# Patient Record
Sex: FEMALE | Race: WHITE | Employment: FULL TIME | URBAN - METROPOLITAN AREA
[De-identification: names, ages, dates, MRNs, and addresses within clinical notes are randomized per-mention and may not be internally consistent; named-entity substitution may affect disease eponyms.]

---

## 2017-07-19 ENCOUNTER — OFFICE VISIT (OUTPATIENT)
Dept: FAMILY MEDICINE CLINIC | Age: 32
End: 2017-07-19

## 2017-07-19 VITALS
HEIGHT: 64 IN | RESPIRATION RATE: 16 BRPM | SYSTOLIC BLOOD PRESSURE: 136 MMHG | HEART RATE: 79 BPM | BODY MASS INDEX: 50.02 KG/M2 | DIASTOLIC BLOOD PRESSURE: 86 MMHG | OXYGEN SATURATION: 96 % | TEMPERATURE: 98.9 F | WEIGHT: 293 LBS

## 2017-07-19 DIAGNOSIS — R07.89 CHEST WALL PAIN: Primary | ICD-10-CM

## 2017-07-19 RX ORDER — LABETALOL 200 MG/1
200 TABLET, FILM COATED ORAL 2 TIMES DAILY
Qty: 60 TAB | Refills: 2 | Status: SHIPPED | OUTPATIENT
Start: 2017-07-19 | End: 2018-05-14

## 2017-07-20 PROBLEM — R07.89 CHEST WALL PAIN: Status: ACTIVE | Noted: 2017-07-20

## 2017-07-20 NOTE — PROGRESS NOTES
Celine Hay is a 27 y.o.  female and presents with reproducible chest wall pain with no pressure type, SOB, anxiety pr radiation to neck or arms. It appeatrs to be constant. She would like to be pregnant but tends to have elevated BP. Patient will be monitoring BP at home. Chief Complaint   Patient presents with    Chest Pain     Subjective: Additional Concerns: non    Patient Active Problem List    Diagnosis Date Noted    Chronic bilateral low back pain without sciatica 07/30/2016    Essential hypertension 01/16/2016    Asthma 01/16/2016    Anxiety 01/16/2016     Current Outpatient Prescriptions   Medication Sig Dispense Refill    labetalol (NORMODYNE) 200 mg tablet Take 1 Tab by mouth two (2) times a day. Indications: hypertension 60 Tab 2    VENTOLIN HFA 90 mcg/actuation inhaler INHALE 2 PUFFS BY MOUTH EVERY 4 HOURS AS NEEDED FOR WHEEZE OR SHORTNESS OF BREATH 1 Inhaler 1    lorcaserin (BELVIQ) 10 mg tab Take 10 mg by mouth two (2) times a day. Max Daily Amount: 20 mg. 60 Tab 0    traMADol (ULTRAM) 50 mg tablet Take 1 Tab by mouth every eight (8) hours as needed for Pain.  Max Daily Amount: 150 mg. 30 Tab 0     No Known Allergies  Past Medical History:   Diagnosis Date    Anxiety     Asthma     Depression     Hypertension      Past Surgical History:   Procedure Laterality Date    HX OTHER SURGICAL      Bladder surger 200    HX WISDOM TEETH EXTRACTION       Family History   Problem Relation Age of Onset    Hypertension Mother     Hypertension Father     Other Brother      Born with heart murmur    Cancer Maternal Grandmother      Breast    Diabetes Maternal Grandfather      Social History   Substance Use Topics    Smoking status: Never Smoker    Smokeless tobacco: Never Used    Alcohol use No     ROS     General: negative for - chills, fatigue, fever, weight change  Resp: negative for - cough, shortness of breath or wheezing  CV: negative for - chest pain, edema or palpitations  MSK: negative for - joint pain, joint swelling or muscle pain  Neuro: negative for - confusion, headaches, seizures or weakness    Objective:  Vitals:    07/19/17 1643   BP: 136/86   Pulse: 79   Resp: 16   Temp: 98.9 °F (37.2 °C)   TempSrc: Oral   SpO2: 96%   Weight: 322 lb 3.2 oz (146.1 kg)   Height: 5' 4\" (1.626 m)   PainSc:   0 - No pain   LMP: 07/16/2017     PE    alert, well appearing, and in no distress and overweight  Mental status - alert, oriented to person, place, and time, normal mood, behavior, speech, dress, motor activity, and thought processes  Chest - clear to auscultation, no wheezes, rales or rhonchi, symmetric air entry  Heart - normal rate, regular rhythm, normal S1, S2, no murmurs, rubs, clicks or gallops  Musculoskeletal - no joint tenderness, deformity or swelling  Extremities - peripheral pulses normal, no pedal edema, no clubbing or cyanosis    LABS   No visits with results within 6 Month(s) from this visit. Latest known visit with results is:    Office Visit on 03/01/2016   Component Date Value Ref Range Status    VALID INTERNAL CONTROL POC 03/01/2016 Yes   Final    HCG urine, Ql. (POC) 03/01/2016 Negative  Negative Final       TESTS  Results for orders placed or performed in visit on 03/01/16   AMB POC URINE PREGNANCY TEST, VISUAL COLOR COMPARISON   Result Value Ref Range    VALID INTERNAL CONTROL POC Yes     HCG urine, Ql. (POC) Negative Negative     Assessment/Plan:    1. Chest wall pain -  OTC tylenol     2. Elevated BP with no dx of HTN - [place] monitor BP. Given Labetalol for BP in case it is persistently greater than 140/90. Lab review: no lab studies available for review at time of visit    I have discussed the diagnosis with the patient and the intended plan as seen in the above orders. The patient has received an after-visit summary and questions were answered concerning future plans.   I have discussed medication side effects and warnings with the patient as well.I have reviewed the plan of care with the patient, accepted their input and they are in agreement with the treatment goals. F/U as needed.      Phu Trent MD

## 2017-07-20 NOTE — PATIENT INSTRUCTIONS
Learning About High Blood Pressure  What is high blood pressure? Blood pressure is a measure of how hard the blood pushes against the walls of your arteries. It's normal for blood pressure to go up and down throughout the day, but if it stays up, you have high blood pressure. Another name for high blood pressure is hypertension. Two numbers tell you your blood pressure. The first number is the systolic pressure. It shows how hard the blood pushes when your heart is pumping. The second number is the diastolic pressure. It shows how hard the blood pushes between heartbeats, when your heart is relaxed and filling with blood. A blood pressure of less than 120/80 (say \"120 over 80\") is ideal for an adult. High blood pressure is 140/90 or higher. You have high blood pressure if your top number is 140 or higher or your bottom number is 90 or higher, or both. Many people fall into the category in between, called prehypertension. People with prehypertension need to make lifestyle changes to bring their blood pressure down and help prevent or delay high blood pressure. What happens when you have high blood pressure? · Blood flows through your arteries with too much force. Over time, this damages the walls of your arteries. But you can't feel it. High blood pressure usually doesn't cause symptoms. · Fat and calcium start to build up in your arteries. This buildup is called plaque. Plaque makes your arteries narrower and stiffer. Blood can't flow through them as easily. · This lack of good blood flow starts to damage some of the organs in your body. This can lead to problems such as coronary artery disease and heart attack, heart failure, stroke, kidney failure, and eye damage. How can you prevent high blood pressure? · Stay at a healthy weight. · Try to limit how much sodium you eat to less than 2,300 milligrams (mg) a day.  If you limit your sodium to 1,500 mg a day, you can lower your blood pressure even more.  ¨ Buy foods that are labeled \"unsalted,\" \"sodium-free,\" or \"low-sodium. \" Foods labeled \"reduced-sodium\" and \"light sodium\" may still have too much sodium. ¨ Flavor your food with garlic, lemon juice, onion, vinegar, herbs, and spices instead of salt. Do not use soy sauce, steak sauce, onion salt, garlic salt, mustard, or ketchup on your food. ¨ Use less salt (or none) when recipes call for it. You can often use half the salt a recipe calls for without losing flavor. · Be physically active. Get at least 30 minutes of exercise on most days of the week. Walking is a good choice. You also may want to do other activities, such as running, swimming, cycling, or playing tennis or team sports. · Limit alcohol to 2 drinks a day for men and 1 drink a day for women. · Eat plenty of fruits, vegetables, and low-fat dairy products. Eat less saturated and total fats. How is high blood pressure treated? · Your doctor will suggest making lifestyle changes. For example, your doctor may ask you to eat healthy foods, quit smoking, lose extra weight, and be more active. · If lifestyle changes don't help enough or your blood pressure is very high, you will have to take medicine every day. Follow-up care is a key part of your treatment and safety. Be sure to make and go to all appointments, and call your doctor if you are having problems. It's also a good idea to know your test results and keep a list of the medicines you take. Where can you learn more? Go to http://evelin-radha.info/. Enter P501 in the search box to learn more about \"Learning About High Blood Pressure. \"  Current as of: March 23, 2016  Content Version: 11.3  © 8047-8490 [employer]. Care instructions adapted under license by [employer] (which disclaims liability or warranty for this information).  If you have questions about a medical condition or this instruction, always ask your healthcare professional. [employer] disclaims any warranty or liability for your use of this information.

## 2018-01-02 ENCOUNTER — TELEPHONE (OUTPATIENT)
Dept: FAMILY MEDICINE CLINIC | Age: 33
End: 2018-01-02

## 2018-01-02 ENCOUNTER — OFFICE VISIT (OUTPATIENT)
Dept: FAMILY MEDICINE CLINIC | Age: 33
End: 2018-01-02

## 2018-01-02 VITALS
HEIGHT: 64 IN | HEART RATE: 84 BPM | DIASTOLIC BLOOD PRESSURE: 107 MMHG | TEMPERATURE: 98.3 F | SYSTOLIC BLOOD PRESSURE: 148 MMHG | WEIGHT: 293 LBS | RESPIRATION RATE: 17 BRPM | BODY MASS INDEX: 50.02 KG/M2 | OXYGEN SATURATION: 97 %

## 2018-01-02 DIAGNOSIS — R63.4 WEIGHT LOSS: Primary | ICD-10-CM

## 2018-01-02 RX ORDER — PHENTERMINE HYDROCHLORIDE 37.5 MG/1
37.5 TABLET ORAL
Qty: 30 TAB | Refills: 0 | Status: SHIPPED | OUTPATIENT
Start: 2018-01-02 | End: 2018-02-02 | Stop reason: SDUPTHER

## 2018-01-02 NOTE — PROGRESS NOTES
Laurence Robins is a 27 y.o. female presents to office for anxiety and depression. Weight concern. Bump on right side of head      1.  Have you been to the ER, urgent care clinic or hospitalized since your last visit? no          Health Maintenance items with a due date reviewed with patient:  Health Maintenance Due   Topic Date Due    Pneumococcal 19-64 Medium Risk (1 of 1 - PPSV23) 01/26/2002    DTaP/Tdap/Td series (1 - Tdap) 01/26/2004    PAP AKA CERVICAL CYTOLOGY  01/26/2004    Influenza Age 9 to Adult  08/01/2017

## 2018-01-02 NOTE — PATIENT INSTRUCTIONS
Anxiety Disorder: Care Instructions  Your Care Instructions    Anxiety is a normal reaction to stress. Difficult situations can cause you to have symptoms such as sweaty palms and a nervous feeling. In an anxiety disorder, the symptoms are far more severe. Constant worry, muscle tension, trouble sleeping, nausea and diarrhea, and other symptoms can make normal daily activities difficult or impossible. These symptoms may occur for no reason, and they can affect your work, school, or social life. Medicines, counseling, and self-care can all help. Follow-up care is a key part of your treatment and safety. Be sure to make and go to all appointments, and call your doctor if you are having problems. It's also a good idea to know your test results and keep a list of the medicines you take. How can you care for yourself at home? · Take medicines exactly as directed. Call your doctor if you think you are having a problem with your medicine. · Go to your counseling sessions and follow-up appointments. · Recognize and accept your anxiety. Then, when you are in a situation that makes you anxious, say to yourself, \"This is not an emergency. I feel uncomfortable, but I am not in danger. I can keep going even if I feel anxious. \"  · Be kind to your body:  ¨ Relieve tension with exercise or a massage. ¨ Get enough rest.  ¨ Avoid alcohol, caffeine, nicotine, and illegal drugs. They can increase your anxiety level and cause sleep problems. ¨ Learn and do relaxation techniques. See below for more about these techniques. · Engage your mind. Get out and do something you enjoy. Go to a funny movie, or take a walk or hike. Plan your day. Having too much or too little to do can make you anxious. · Keep a record of your symptoms. Discuss your fears with a good friend or family member, or join a support group for people with similar problems. Talking to others sometimes relieves stress.   · Get involved in social groups, or volunteer to help others. Being alone sometimes makes things seem worse than they are. · Get at least 30 minutes of exercise on most days of the week to relieve stress. Walking is a good choice. You also may want to do other activities, such as running, swimming, cycling, or playing tennis or team sports. Relaxation techniques  Do relaxation exercises 10 to 20 minutes a day. You can play soothing, relaxing music while you do them, if you wish. · Tell others in your house that you are going to do your relaxation exercises. Ask them not to disturb you. · Find a comfortable place, away from all distractions and noise. · Lie down on your back, or sit with your back straight. · Focus on your breathing. Make it slow and steady. · Breathe in through your nose. Breathe out through either your nose or mouth. · Breathe deeply, filling up the area between your navel and your rib cage. Breathe so that your belly goes up and down. · Do not hold your breath. · Breathe like this for 5 to 10 minutes. Notice the feeling of calmness throughout your whole body. As you continue to breathe slowly and deeply, relax by doing the following for another 5 to 10 minutes:  · Tighten and relax each muscle group in your body. You can begin at your toes and work your way up to your head. · Imagine your muscle groups relaxing and becoming heavy. · Empty your mind of all thoughts. · Let yourself relax more and more deeply. · Become aware of the state of calmness that surrounds you. · When your relaxation time is over, you can bring yourself back to alertness by moving your fingers and toes and then your hands and feet and then stretching and moving your entire body. Sometimes people fall asleep during relaxation, but they usually wake up shortly afterward. · Always give yourself time to return to full alertness before you drive a car or do anything that might cause an accident if you are not fully alert.  Never play a relaxation tape while you drive a car. When should you call for help? Call 911 anytime you think you may need emergency care. For example, call if:  ? · You feel you cannot stop from hurting yourself or someone else. ? Keep the numbers for these national suicide hotlines: 9-274-846-TALK (8-979.807.9217) and 3-492-BLDRUNK (5-911.741.9533). If you or someone you know talks about suicide or feeling hopeless, get help right away. ? Watch closely for changes in your health, and be sure to contact your doctor if:  ? · You have anxiety or fear that affects your life. ? · You have symptoms of anxiety that are new or different from those you had before. Where can you learn more? Go to http://evelin-radha.info/. Enter P754 in the search box to learn more about \"Anxiety Disorder: Care Instructions. \"  Current as of: May 12, 2017  Content Version: 11.4  © 8888-7390 [employer]. Care instructions adapted under license by [employer] (which disclaims liability or warranty for this information). If you have questions about a medical condition or this instruction, always ask your healthcare professional. [address] any warranty or liability for your use of this information. Learning About Bariatric Surgery  What is bariatric surgery? Bariatric surgery is surgery to help you lose weight. This type of surgery is only used for people who are very overweight and have not been able to lose weight with diet and exercise. This surgery makes the stomach smaller. Some types of surgery also change the connection between your stomach and intestines. How is bariatric surgery done? Bariatric surgery may be either \"open\" or \"laparoscopic. \" Open surgery is done through a large cut (incision) in the belly. Laparoscopic surgery is done through several small cuts. The doctor puts a lighted tube, or scope, and other surgical tools through small cuts in your belly.  The doctor is able to see your organs with the scope. There are different types of bariatric surgery. Gastric sleeve surgery  The surgery is usually done through several small incisions in the belly. The doctor removes more than half of your stomach. This leaves a thin sleeve, or tube, that is about the size of a banana. Because part of your stomach has been removed, this can't be reversed. Nandini-en-Y gastric bypass surgery  Nandini-en-Y (say \"liana-en-why\") surgery changes the connection between the stomach and the intestines. The doctor separates a section of your stomach from the rest of your stomach. This makes a small pouch. The new pouch will hold the food you eat. The doctor connects the stomach pouch to the middle part of the small intestine. Gastric banding surgery  The surgery is usually done through several small incisions in the belly. The doctor wraps a band around the upper part of the stomach. This creates a small pouch. The small size of the pouch means that you will get full after you eat just a small amount of food. The doctor can inflate or deflate the band to adjust the size. This lets the doctor adjust how quickly food passes from the new pouch into the stomach. It does not change the connection between the stomach and the intestines. What can you expect after the surgery? You may stay in the hospital for one or more days after the surgery. How long you stay depends on the type of surgery you had. Most people need 2 to 4 weeks before they are ready to get back to their usual routine. For the first 2 to 6 weeks after surgery, you probably will need to follow a liquid or soft diet. Bit by bit, you will be able to eat more solid foods. Your doctor may advise you to work with a dietitian. This way you'll be sure to get enough protein, vitamins, and minerals while you are losing weight. Even with a healthy diet, you may need to take vitamin and mineral supplements.   After surgery, you will not be able to eat very much at one time. You will get full quickly. Try not to eat too much at one time or eat foods that are high in fat or sugar. If you do, you may vomit, get stomach pain, or have diarrhea. You probably will lose weight very quickly in the first few months after surgery. As time goes on, your weight loss will slow down. You will have regular doctor visits to check how you are doing. Think of bariatric surgery as a tool to help you lose weight. It isn't an instant fix. You will still need to eat a healthy diet and get regular exercise. This will help you reach your weight goal and avoid regaining the weight you lose. Follow-up care is a key part of your treatment and safety. Be sure to make and go to all appointments, and call your doctor if you are having problems. It's also a good idea to know your test results and keep a list of the medicines you take. Where can you learn more? Go to http://evelinMD Lingoradha.info/. Enter G469 in the search box to learn more about \"Learning About Bariatric Surgery. \"  Current as of: October 13, 2016  Content Version: 11.4  © 0627-9130 [employer]. Care instructions adapted under license by [employer] (which disclaims liability or warranty for this information). If you have questions about a medical condition or this instruction, always ask your healthcare professional. [address] any warranty or liability for your use of this information. Low Sodium Diet (2,000 Milligram): Care Instructions  Your Care Instructions    Too much sodium causes your body to hold on to extra water. This can raise your blood pressure and force your heart and kidneys to work harder. In very serious cases, this could cause you to be put in the hospital. It might even be life-threatening. By limiting sodium, you will feel better and lower your risk of serious problems. The most common source of sodium is salt.  People get most of the salt in their diet from canned, prepared, and packaged foods. Fast food and restaurant meals also are very high in sodium. Your doctor will probably limit your sodium to less than 2,000 milligrams (mg) a day. This limit counts all the sodium in prepared and packaged foods and any salt you add to your food. Follow-up care is a key part of your treatment and safety. Be sure to make and go to all appointments, and call your doctor if you are having problems. It's also a good idea to know your test results and keep a list of the medicines you take. How can you care for yourself at home? Read food labels  · Read labels on cans and food packages. The labels tell you how much sodium is in each serving. Make sure that you look at the serving size. If you eat more than the serving size, you have eaten more sodium. · Food labels also tell you the Percent Daily Value for sodium. Choose products with low Percent Daily Values for sodium. · Be aware that sodium can come in forms other than salt, including monosodium glutamate (MSG), sodium citrate, and sodium bicarbonate (baking soda). MSG is often added to Asian food. When you eat out, you can sometimes ask for food without MSG or added salt. Buy low-sodium foods  · Buy foods that are labeled \"unsalted\" (no salt added), \"sodium-free\" (less than 5 mg of sodium per serving), or \"low-sodium\" (less than 140 mg of sodium per serving). Foods labeled \"reduced-sodium\" and \"light sodium\" may still have too much sodium. Be sure to read the label to see how much sodium you are getting. · Buy fresh vegetables, or frozen vegetables without added sauces. Buy low-sodium versions of canned vegetables, soups, and other canned goods. Prepare low-sodium meals  · Cut back on the amount of salt you use in cooking. This will help you adjust to the taste. Do not add salt after cooking. One teaspoon of salt has about 2,300 mg of sodium. · Take the salt shaker off the table.   · Flavor your food with garlic, lemon juice, onion, vinegar, herbs, and spices. Do not use soy sauce, lite soy sauce, steak sauce, onion salt, garlic salt, celery salt, mustard, or ketchup on your food. · Use low-sodium salad dressings, sauces, and ketchup. Or make your own salad dressings and sauces without adding salt. · Use less salt (or none) when recipes call for it. You can often use half the salt a recipe calls for without losing flavor. Other foods such as rice, pasta, and grains do not need added salt. · Rinse canned vegetables, and cook them in fresh water. This removes some-but not all-of the salt. · Avoid water that is naturally high in sodium or that has been treated with water softeners, which add sodium. Call your local water company to find out the sodium content of your water supply. If you buy bottled water, read the label and choose a sodium-free brand. Avoid high-sodium foods  · Avoid eating:  ¨ Smoked, cured, salted, and canned meat, fish, and poultry. ¨ Ham, mays, hot dogs, and luncheon meats. ¨ Regular, hard, and processed cheese and regular peanut butter. ¨ Crackers with salted tops, and other salted snack foods such as pretzels, chips, and salted popcorn. ¨ Frozen prepared meals, unless labeled low-sodium. ¨ Canned and dried soups, broths, and bouillon, unless labeled sodium-free or low-sodium. ¨ Canned vegetables, unless labeled sodium-free or low-sodium. ¨ [place] fries, pizza, tacos, and other fast foods. ¨ Pickles, olives, ketchup, and other condiments, especially soy sauce, unless labeled sodium-free or low-sodium. Where can you learn more? Go to http://evelin-radha.info/. Enter A384 in the search box to learn more about \"Low Sodium Diet (2,000 Milligram): Care Instructions. \"  Current as of: May 12, 2017  Content Version: 11.4  © 9469-4805 [employer].  Care instructions adapted under license by [employer] (which disclaims liability or warranty for this information). If you have questions about a medical condition or this instruction, always ask your healthcare professional. [address] any warranty or liability for your use of this information. DASH Diet: Care Instructions  Your Care Instructions    The DASH diet is an eating plan that can help lower your blood pressure. DASH stands for Dietary Approaches to Stop Hypertension. Hypertension is high blood pressure. The DASH diet focuses on eating foods that are high in calcium, potassium, and magnesium. These nutrients can lower blood pressure. The foods that are highest in these nutrients are fruits, vegetables, low-fat dairy products, nuts, seeds, and legumes. But taking calcium, potassium, and magnesium supplements instead of eating foods that are high in those nutrients does not have the same effect. The DASH diet also includes whole grains, fish, and poultry. The DASH diet is one of several lifestyle changes your doctor may recommend to lower your high blood pressure. Your doctor may also want you to decrease the amount of sodium in your diet. Lowering sodium while following the DASH diet can lower blood pressure even further than just the DASH diet alone. Follow-up care is a key part of your treatment and safety. Be sure to make and go to all appointments, and call your doctor if you are having problems. It's also a good idea to know your test results and keep a list of the medicines you take. How can you care for yourself at home? Following the DASH diet  · Eat 4 to 5 servings of fruit each day. A serving is 1 medium-sized piece of fruit, ½ cup chopped or canned fruit, 1/4 cup dried fruit, or 4 ounces (½ cup) of fruit juice. Choose fruit more often than fruit juice. · Eat 4 to 5 servings of vegetables each day. A serving is 1 cup of lettuce or raw leafy vegetables, ½ cup of chopped or cooked vegetables, or 4 ounces (½ cup) of vegetable juice.  Choose vegetables more often than vegetable juice. · Get 2 to 3 servings of low-fat and fat-free dairy each day. A serving is 8 ounces of milk, 1 cup of yogurt, or 1 ½ ounces of cheese. · Eat 6 to 8 servings of grains each day. A serving is 1 slice of bread, 1 ounce of dry cereal, or ½ cup of cooked rice, pasta, or cooked cereal. Try to choose whole-grain products as much as possible. · Limit lean meat, poultry, and fish to 2 servings each day. A serving is 3 ounces, about the size of a deck of cards. · Eat 4 to 5 servings of nuts, seeds, and legumes (cooked dried beans, lentils, and split peas) each week. A serving is 1/3 cup of nuts, 2 tablespoons of seeds, or ½ cup of cooked beans or peas. · Limit fats and oils to 2 to 3 servings each day. A serving is 1 teaspoon of vegetable oil or 2 tablespoons of salad dressing. · Limit sweets and added sugars to 5 servings or less a week. A serving is 1 tablespoon jelly or jam, ½ cup sorbet, or 1 cup of lemonade. · Eat less than 2,300 milligrams (mg) of sodium a day. If you limit your sodium to 1,500 mg a day, you can lower your blood pressure even more. Tips for success  · Start small. Do not try to make dramatic changes to your diet all at once. You might feel that you are missing out on your favorite foods and then be more likely to not follow the plan. Make small changes, and stick with them. Once those changes become habit, add a few more changes. · Try some of the following:  ¨ Make it a goal to eat a fruit or vegetable at every meal and at snacks. This will make it easy to get the recommended amount of fruits and vegetables each day. ¨ Try yogurt topped with fruit and nuts for a snack or healthy dessert. ¨ Add lettuce, tomato, cucumber, and onion to sandwiches. ¨ Combine a ready-made pizza crust with low-fat mozzarella cheese and lots of vegetable toppings. Try using tomatoes, squash, spinach, broccoli, carrots, cauliflower, and onions.   ¨ Have a variety of cut-up vegetables with a low-fat dip as an appetizer instead of chips and dip. ¨ Sprinkle sunflower seeds or chopped almonds over salads. Or try adding chopped walnuts or almonds to cooked vegetables. ¨ Try some vegetarian meals using beans and peas. Add garbanzo or kidney beans to salads. Make burritos and tacos with mashed marion beans or black beans. Where can you learn more? Go to http://evelin-radha.info/. Enter I266 in the search box to learn more about \"DASH Diet: Care Instructions. \"  Current as of: September 21, 2016  Content Version: 11.4  © 1459-0760 [employer]. Care instructions adapted under license by [employer] (which disclaims liability or warranty for this information). If you have questions about a medical condition or this instruction, always ask your healthcare professional. [address] any warranty or liability for your use of this information.

## 2018-01-02 NOTE — PROGRESS NOTES
Nancy Shay is a 27 y.o.  female and presents with difficulty losing weight. Had been through medically supervised  Weight loss program. She is anxious about her weight and would like to retry phentermine which she had many years ago. Het BP is elevated today   However. Chief Complaint   Patient presents with    Weight Gain    Anxiety    Depression     Subjective: Additional Concerns: none    Patient Active Problem List    Diagnosis Date Noted    Chest wall pain 07/20/2017    Chronic bilateral low back pain without sciatica 07/30/2016    Essential hypertension 01/16/2016    Asthma 01/16/2016    Anxiety 01/16/2016     Current Outpatient Prescriptions   Medication Sig Dispense Refill    phentermine (ADIPEX-P) 37.5 mg tablet Take 1 Tab by mouth every morning. Max Daily Amount: 37.5 mg. Indications: WEIGHT LOSS MANAGEMENT FOR OBESE PATIENT (BMI >= 30) 30 Tab 0    labetalol (NORMODYNE) 200 mg tablet Take 1 Tab by mouth two (2) times a day.  Indications: hypertension 60 Tab 2     No Known Allergies  Past Medical History:   Diagnosis Date    Anxiety     Asthma     Depression     Hypertension      Past Surgical History:   Procedure Laterality Date    HX OTHER SURGICAL      Bladder surger 200    HX WISDOM TEETH EXTRACTION       Family History   Problem Relation Age of Onset    Hypertension Mother     Hypertension Father     Other Brother      Born with heart murmur    Cancer Maternal Grandmother      Breast    Diabetes Maternal Grandfather      Social History   Substance Use Topics    Smoking status: Never Smoker    Smokeless tobacco: Never Used    Alcohol use No     ROS     General: negative for - chills, fatigue, fever, weight change  Psych: negative for - anxiety, depression, irritability or mood swings  Resp: negative for - cough, shortness of breath or wheezing  CV: negative for - chest pain, edema or palpitations  GI: negative for - abdominal pain, change in bowel habits, constipation, diarrhea or nausea/vomiting  : negative for - dysuria, hematuria, incontinence, pelvic pain or vulvar/vaginal symptoms  MSK: negative for - joint pain, joint swelling or muscle pain  Neuro: negative for - confusion, headaches, seizures or weakness  Derm: negative for - dry skin, hair changes, rash or skin lesion changes    Objective:  Vitals:    01/02/18 1112   BP: (!) 148/107   Pulse: 84   Resp: 17   Temp: 98.3 °F (36.8 °C)   TempSrc: Oral   SpO2: 97%   Weight: 328 lb 3.2 oz (148.9 kg)   Height: 5' 4\" (1.626 m)   PainSc:   0 - No pain   LMP: 12/04/2017     PE    Alert, well appearing, and in no distress, oriented to person, place, and time and overweight  General appearance - alert, well appearing, and in no distress and oriented to person, place, and time  Mental status - alert, oriented to person, place, and time, normal mood, behavior, speech, dress, motor activity, and thought processes  Chest - clear to auscultation, no wheezes, rales or rhonchi, symmetric air entry  Heart - normal rate, regular rhythm, normal S1, S2, no murmurs, rubs, clicks or gallops  Extremities - peripheral pulses normal, no pedal edema, no clubbing or cyanosis    LABS   No visits with results within 6 Month(s) from this visit. Latest known visit with results is:    Office Visit on 03/01/2016   Component Date Value Ref Range Status    VALID INTERNAL CONTROL POC 03/01/2016 Yes   Final    HCG urine, Ql. (POC) 03/01/2016 Negative  Negative Final       TESTS  Results for orders placed or performed in visit on 03/01/16   AMB POC URINE PREGNANCY TEST, VISUAL COLOR COMPARISON   Result Value Ref Range    VALID INTERNAL CONTROL POC Yes     HCG urine, Ql. (POC) Negative Negative     Assessment/Plan:      Weight gain, chronic - Trial of   - phentermine (ADIPEX-P) 37.5 mg tablet; Take 1 Tab by mouth every morning. Max Daily Amount: 37.5 mg. Indications: WEIGHT LOSS MANAGEMENT FOR OBESE PATIENT (BMI >= 30)  Dispense: 30 Tab;  Refill: 0  Patient given free support online for weight loss. Bariatric info given to patient for possible consultation. Lab review: orders written for new lab studies as appropriate; see orders. I have discussed the diagnosis with the patient and the intended plan as seen in the above orders. The patient has received an after-visit summary and questions were answered concerning future plans. I have discussed medication side effects and warnings with the patient as well. I have reviewed the plan of care with the patient, accepted their input and they are in agreement with the treatment goals. Follow-up Disposition:  Return in about 1 month (around 2/2/2018), or if symptoms worsen or fail to improve.     Maylin Arambula MD

## 2018-01-03 ENCOUNTER — CLINICAL SUPPORT (OUTPATIENT)
Dept: FAMILY MEDICINE CLINIC | Age: 33
End: 2018-01-03

## 2018-01-03 ENCOUNTER — HOSPITAL ENCOUNTER (OUTPATIENT)
Dept: LAB | Age: 33
Discharge: HOME | End: 2018-01-03
Payer: COMMERCIAL

## 2018-01-03 VITALS — HEART RATE: 92 BPM | DIASTOLIC BLOOD PRESSURE: 103 MMHG | SYSTOLIC BLOOD PRESSURE: 164 MMHG

## 2018-01-03 DIAGNOSIS — Z01.30 BP CHECK: Primary | ICD-10-CM

## 2018-01-03 DIAGNOSIS — R63.4 WEIGHT LOSS: ICD-10-CM

## 2018-01-03 LAB
ALBUMIN SERPL-MCNC: 3.5 G/DL (ref 3.4–5)
ALBUMIN/GLOB SERPL: 1.1 {RATIO} (ref 0.8–1.7)
ALP SERPL-CCNC: 110 U/L (ref 45–117)
ALT SERPL-CCNC: 29 U/L (ref 13–56)
ANION GAP SERPL CALC-SCNC: 9 MMOL/L (ref 3–18)
AST SERPL-CCNC: 15 U/L (ref 15–37)
BASOPHILS # BLD: 0 K/UL (ref 0–0.06)
BASOPHILS NFR BLD: 0 % (ref 0–2)
BILIRUB SERPL-MCNC: 0.3 MG/DL (ref 0.2–1)
BUN SERPL-MCNC: 11 MG/DL (ref 7–18)
BUN/CREAT SERPL: 16 (ref 12–20)
CALCIUM SERPL-MCNC: 8.5 MG/DL (ref 8.5–10.1)
CHLORIDE SERPL-SCNC: 105 MMOL/L (ref 100–108)
CHOLEST SERPL-MCNC: 207 MG/DL
CO2 SERPL-SCNC: 27 MMOL/L (ref 21–32)
CREAT SERPL-MCNC: 0.67 MG/DL (ref 0.6–1.3)
DIFFERENTIAL METHOD BLD: NORMAL
EOSINOPHIL # BLD: 0.2 K/UL (ref 0–0.4)
EOSINOPHIL NFR BLD: 3 % (ref 0–5)
ERYTHROCYTE [DISTWIDTH] IN BLOOD BY AUTOMATED COUNT: 13.5 % (ref 11.6–14.5)
EST. AVERAGE GLUCOSE BLD GHB EST-MCNC: 120 MG/DL
GLOBULIN SER CALC-MCNC: 3.3 G/DL (ref 2–4)
GLUCOSE SERPL-MCNC: 106 MG/DL (ref 74–99)
HBA1C MFR BLD: 5.8 % (ref 4.2–5.6)
HCT VFR BLD AUTO: 42.4 % (ref 35–45)
HDLC SERPL-MCNC: 60 MG/DL (ref 40–60)
HDLC SERPL: 3.5 {RATIO} (ref 0–5)
HGB BLD-MCNC: 13.6 G/DL (ref 12–16)
LDLC SERPL CALC-MCNC: 127.4 MG/DL (ref 0–100)
LIPID PROFILE,FLP: ABNORMAL
LYMPHOCYTES # BLD: 2.1 K/UL (ref 0.9–3.6)
LYMPHOCYTES NFR BLD: 31 % (ref 21–52)
MCH RBC QN AUTO: 29 PG (ref 24–34)
MCHC RBC AUTO-ENTMCNC: 32.1 G/DL (ref 31–37)
MCV RBC AUTO: 90.4 FL (ref 74–97)
MONOCYTES # BLD: 0.4 K/UL (ref 0.05–1.2)
MONOCYTES NFR BLD: 6 % (ref 3–10)
NEUTS SEG # BLD: 4.1 K/UL (ref 1.8–8)
NEUTS SEG NFR BLD: 60 % (ref 40–73)
PLATELET # BLD AUTO: 267 K/UL (ref 135–420)
PMV BLD AUTO: 10.9 FL (ref 9.2–11.8)
POTASSIUM SERPL-SCNC: 4.2 MMOL/L (ref 3.5–5.5)
PROT SERPL-MCNC: 6.8 G/DL (ref 6.4–8.2)
RBC # BLD AUTO: 4.69 M/UL (ref 4.2–5.3)
SODIUM SERPL-SCNC: 141 MMOL/L (ref 136–145)
TRIGL SERPL-MCNC: 98 MG/DL (ref ?–150)
TSH SERPL DL<=0.05 MIU/L-ACNC: 1.16 UIU/ML (ref 0.36–3.74)
VLDLC SERPL CALC-MCNC: 19.6 MG/DL
WBC # BLD AUTO: 6.8 K/UL (ref 4.6–13.2)

## 2018-01-03 PROCEDURE — 85025 COMPLETE CBC W/AUTO DIFF WBC: CPT | Performed by: FAMILY MEDICINE

## 2018-01-03 PROCEDURE — 80053 COMPREHEN METABOLIC PANEL: CPT | Performed by: FAMILY MEDICINE

## 2018-01-03 PROCEDURE — 80061 LIPID PANEL: CPT | Performed by: FAMILY MEDICINE

## 2018-01-03 PROCEDURE — 84443 ASSAY THYROID STIM HORMONE: CPT | Performed by: FAMILY MEDICINE

## 2018-01-03 PROCEDURE — 36415 COLL VENOUS BLD VENIPUNCTURE: CPT | Performed by: FAMILY MEDICINE

## 2018-01-03 PROCEDURE — 83036 HEMOGLOBIN GLYCOSYLATED A1C: CPT | Performed by: FAMILY MEDICINE

## 2018-01-03 RX ORDER — LISINOPRIL 20 MG/1
20 TABLET ORAL DAILY
Qty: 30 TAB | Refills: 2 | Status: SHIPPED | OUTPATIENT
Start: 2018-01-03 | End: 2018-04-02 | Stop reason: SDUPTHER

## 2018-01-09 ENCOUNTER — TELEPHONE (OUTPATIENT)
Dept: FAMILY MEDICINE CLINIC | Age: 33
End: 2018-01-09

## 2018-01-09 NOTE — PROGRESS NOTES
Pls report a1C slightly above normal like 2 years ago. Chol acceptable. Plan is some diet and exercise and recheck in 6-12 months time.    Rest of screening labs normal or near normal.

## 2018-01-09 NOTE — TELEPHONE ENCOUNTER
----- Message from Phoebe Davis MD sent at 1/9/2018  5:30 AM EST -----  Pls report a1C slightly above normal like 2 years ago. Chol acceptable. Plan is some diet and exercise and recheck in 6-12 months time.    Rest of screening labs normal or near normal.

## 2018-02-02 ENCOUNTER — OFFICE VISIT (OUTPATIENT)
Dept: FAMILY MEDICINE CLINIC | Age: 33
End: 2018-02-02

## 2018-02-02 VITALS
DIASTOLIC BLOOD PRESSURE: 73 MMHG | WEIGHT: 293 LBS | BODY MASS INDEX: 50.02 KG/M2 | HEIGHT: 64 IN | HEART RATE: 114 BPM | OXYGEN SATURATION: 97 % | TEMPERATURE: 97 F | RESPIRATION RATE: 17 BRPM | SYSTOLIC BLOOD PRESSURE: 133 MMHG

## 2018-02-02 DIAGNOSIS — R63.4 WEIGHT LOSS: ICD-10-CM

## 2018-02-02 PROBLEM — E66.01 OBESITY, MORBID (HCC): Status: ACTIVE | Noted: 2018-02-02

## 2018-02-02 RX ORDER — ESCITALOPRAM OXALATE 20 MG/1
20 TABLET ORAL DAILY
COMMUNITY
End: 2018-05-14

## 2018-02-02 RX ORDER — PHENTERMINE HYDROCHLORIDE 37.5 MG/1
37.5 TABLET ORAL
Qty: 30 TAB | Refills: 0 | Status: SHIPPED | OUTPATIENT
Start: 2018-03-02 | End: 2018-05-14

## 2018-02-02 RX ORDER — PHENTERMINE HYDROCHLORIDE 37.5 MG/1
37.5 TABLET ORAL
Qty: 30 TAB | Refills: 0 | Status: SHIPPED | OUTPATIENT
Start: 2018-02-02 | End: 2018-05-14

## 2018-02-02 NOTE — PROGRESS NOTES
Avel Goldberg is a 26 y.o. female presents to office for htn and medication refill    1.  Have you been to the ER, urgent care clinic or hospitalized since your last visit? no        Health Maintenance items with a due date reviewed with patient:  Health Maintenance Due   Topic Date Due    Pneumococcal 19-64 Medium Risk (1 of 1 - PPSV23) 01/26/2002    DTaP/Tdap/Td series (1 - Tdap) 01/26/2004    PAP AKA CERVICAL CYTOLOGY  01/26/2004    Influenza Age 9 to Adult  08/01/2017

## 2018-02-02 NOTE — PATIENT INSTRUCTIONS
Abnormal Weight Gain: Care Instructions  Your Care Instructions    There are two types of weight gain-normal and abnormal. Normal weight gain is usually caused by eating too much or exercising too little. It can also happen as you get older. But abnormal weight gain has other causes. It can be caused by a problem with your thyroid gland, called hypothyroidism. Or it can be caused by a problem with your adrenal glands, called Cushing's syndrome. Or your body could be holding too much fluid because of kidney, liver, or heart problems. In some cases, a medicine you take can cause you to gain weight. You can work with your doctor to find out the cause of your weight gain. You will probably need tests to do this. Follow-up care is a key part of your treatment and safety. Be sure to make and go to all appointments, and call your doctor if you are having problems. It's also a good idea to know your test results and keep a list of the medicines you take. How can you care for yourself at home? · Weigh yourself at the same time every day. It's best to do it first thing in the morning after you empty your bladder. Be sure to always wear the same amount of clothing. · Write down any changes in your weight and the possible causes. Discuss these with your doctor. · Your doctor may want you to change your diet and exercise habits. A good way to lose weight is to reduce calories and increase exercise. · Walking is an easy way to get exercise. Try to walk a little longer every day. You also may want to swim, bike, or do other activities. · Ask your doctor if you should see a dietitian. This is a person who can help you plan meals that work best for your lifestyle. · If your doctor prescribed medicines, take them exactly as prescribed. Call your doctor if you think you are having a problem with your medicine. You will get more details on the specific medicines your doctor prescribes.   When should you call for help?  Watch closely for changes in your health, and be sure to contact your doctor if:  ? · You do not get better as expected. ? · You continue to gain weight. Where can you learn more? Go to http://evelin-ardha.info/. Enter A175 in the search box to learn more about \"Abnormal Weight Gain: Care Instructions. \"  Current as of: October 13, 2016  Content Version: 11.4  © 7300-4131 [employer] Care instructions adapted under license by [employer] (which disclaims liability or warranty for this information). If you have questions about a medical condition or this instruction, always ask your healthcare professional. [address] any warranty or liability for your use of this information.

## 2018-02-02 NOTE — PROGRESS NOTES
Baylee Stephens is a 26 y.o. {race/ethnicity:54322} female and presents with ***    Chief Complaint   Patient presents with    Hypertension    Medication Refill       Subjective:    {HPI:86994}  {Chronic Disease HPI:98988}    Additional Concerns: ***     Patient Active Problem List    Diagnosis Date Noted    Obesity, morbid ([address] 02/02/2018    Chest wall pain 07/20/2017    Chronic bilateral low back pain without sciatica 07/30/2016    Essential hypertension 01/16/2016    Asthma 01/16/2016    Anxiety 01/16/2016     Current Outpatient Prescriptions   Medication Sig Dispense Refill    escitalopram oxalate (LEXAPRO) 20 mg tablet Take 20 mg by mouth daily.  phentermine (ADIPEX-P) 37.5 mg tablet Take 1 Tab by mouth every morning. Max Daily Amount: 37.5 mg. Indications: WEIGHT LOSS MANAGEMENT FOR OBESE PATIENT (BMI >= 30) 30 Tab 0    [START ON 3/2/2018] phentermine (ADIPEX-P) 37.5 mg tablet Take 1 Tab by mouth every morning. Max Daily Amount: 37.5 mg. 30 Tab 0    lisinopril (PRINIVIL, ZESTRIL) 20 mg tablet Take 1 Tab by mouth daily. 30 Tab 2    labetalol (NORMODYNE) 200 mg tablet Take 1 Tab by mouth two (2) times a day.  Indications: hypertension 60 Tab 2     No Known Allergies  Past Medical History:   Diagnosis Date    Anxiety     Asthma     Depression     Hypertension      Past Surgical History:   Procedure Laterality Date    HX OTHER SURGICAL      Bladder surger 200    HX WISDOM TEETH EXTRACTION       Family History   Problem Relation Age of Onset    Hypertension Mother     Hypertension Father     Other Brother      Born with heart murmur    Cancer Maternal Grandmother      Breast    Diabetes Maternal Grandfather      Social History   Substance Use Topics    Smoking status: Never Smoker    Smokeless tobacco: Never Used    Alcohol use No       ROS     {ros master:324985}    General: negative for - chills, fatigue, fever, weight change  Psych: negative for - anxiety, depression, irritability or mood swings  ENT: negative for - headaches, hearing change, nasal congestion, oral lesions, sneezing or sore throat  Heme/ Lymph: negative for - bleeding problems, bruising, pallor or swollen lymph nodes  Endo: negative for - hot flashes, polydipsia/polyuria or temperature intolerance  Resp: negative for - cough, shortness of breath or wheezing  CV: negative for - chest pain, edema or palpitations  GI: negative for - abdominal pain, change in bowel habits, constipation, diarrhea or nausea/vomiting  : negative for - dysuria, hematuria, incontinence, pelvic pain or vulvar/vaginal symptoms  MSK: negative for - joint pain, joint swelling or muscle pain  Neuro: negative for - confusion, headaches, seizures or weakness  Derm: negative for - dry skin, hair changes, rash or skin lesion changes    Objective:  Vitals:    02/02/18 1511   BP: 133/73   Pulse: (!) 114   Resp: 17   Temp: 97 °F (36.1 °C)   TempSrc: Oral   SpO2: 97%   Weight: 312 lb (141.5 kg)   Height: 5' 4\" (1.626 m)   PainSc:   0 - No pain   LMP: 01/10/2018       {appearance:256639::\"alert, well appearing, and in no distress\"}  {PE ADULT/PEDS  MALE/FEMALE:17299}  {Exam, Complete:64971}  {cvs disease exams:041090}    [facility] Outpatient Visit on 01/03/2018   Component Date Value Ref Range Status    LIPID PROFILE 01/03/2018        Final    Cholesterol, total 01/03/2018 207* <200 MG/DL Final    Triglyceride 01/03/2018 98  <150 MG/DL Final    Comment: The drugs N-acetylcysteine (NAC) and  Metamiszole have been found to cause falsely  low results in this chemical assay. Please  be sure to submit blood samples obtained  BEFORE administration of either of these  drugs to assure correct results.       HDL Cholesterol 01/03/2018 60  40 - 60 MG/DL Final    LDL, calculated 01/03/2018 127.4* 0 - 100 MG/DL Final    VLDL, calculated 01/03/2018 19.6  MG/DL Final    CHOL/HDL Ratio 01/03/2018 3.5  0 - 5.0   Final    WBC 01/03/2018 6.8  4.6 - 13.2 K/uL Final    RBC 01/03/2018 4.69  4.20 - 5.30 M/uL Final    HGB 01/03/2018 13.6  12.0 - 16.0 g/dL Final    HCT 01/03/2018 42.4  35.0 - 45.0 % Final    MCV 01/03/2018 90.4  74.0 - 97.0 FL Final    MCH 01/03/2018 29.0  24.0 - 34.0 PG Final    MCHC 01/03/2018 32.1  31.0 - 37.0 g/dL Final    RDW 01/03/2018 13.5  11.6 - 14.5 % Final    PLATELET 37/80/2941 475  135 - 420 K/uL Final    MPV 01/03/2018 10.9  9.2 - 11.8 FL Final    NEUTROPHILS 01/03/2018 60  40 - 73 % Final    LYMPHOCYTES 01/03/2018 31  21 - 52 % Final    MONOCYTES 01/03/2018 6  3 - 10 % Final    EOSINOPHILS 01/03/2018 3  0 - 5 % Final    BASOPHILS 01/03/2018 0  0 - 2 % Final    ABS. NEUTROPHILS 01/03/2018 4.1  1.8 - 8.0 K/UL Final    ABS. LYMPHOCYTES 01/03/2018 2.1  0.9 - 3.6 K/UL Final    ABS. MONOCYTES 01/03/2018 0.4  0.05 - 1.2 K/UL Final    ABS. EOSINOPHILS 01/03/2018 0.2  0.0 - 0.4 K/UL Final    ABS. BASOPHILS 01/03/2018 0.0  0.0 - 0.06 K/UL Final    DF 01/03/2018 AUTOMATED    Final    Sodium 01/03/2018 141  136 - 145 mmol/L Final    Potassium 01/03/2018 4.2  3.5 - 5.5 mmol/L Final    Chloride 01/03/2018 105  100 - 108 mmol/L Final    CO2 01/03/2018 27  21 - 32 mmol/L Final    Anion gap 01/03/2018 9  3.0 - 18 mmol/L Final    Glucose 01/03/2018 106* 74 - 99 mg/dL Final    BUN 01/03/2018 11  7.0 - 18 MG/DL Final    Creatinine 01/03/2018 0.67  0.6 - 1.3 MG/DL Final    BUN/Creatinine ratio 01/03/2018 16  12 - 20   Final    GFR est AA 01/03/2018 >60  >60 ml/min/1.73m2 Final    GFR est non-AA 01/03/2018 >60  >60 ml/min/1.73m2 Final    Comment: (NOTE)  Estimated GFR is calculated using the Modification of Diet in Renal   Disease (MDRD) Study equation, reported for both  Americans   (GFRAA) and non- Americans (GFRNA), and normalized to 1.73m2   body surface area. The physician must decide which value applies to   the patient. The MDRD study equation should only be used in   individuals age 25 or older.  It has not been validated for the   following: pregnant women, patients with serious comorbid conditions,   or on certain medications, or persons with extremes of body size,   muscle mass, or nutritional status.  Calcium 01/03/2018 8.5  8.5 - 10.1 MG/DL Final    Bilirubin, total 01/03/2018 0.3  0.2 - 1.0 MG/DL Final    ALT (SGPT) 01/03/2018 29  13 - 56 U/L Final    AST (SGOT) 01/03/2018 15  15 - 37 U/L Final    Alk. phosphatase 01/03/2018 110  45 - 117 U/L Final    Protein, total 01/03/2018 6.8  6.4 - 8.2 g/dL Final    Albumin 01/03/2018 3.5  3.4 - 5.0 g/dL Final    Globulin 01/03/2018 3.3  2.0 - 4.0 g/dL Final    A-G Ratio 01/03/2018 1.1  0.8 - 1.7   Final    TSH 01/03/2018 1.16  0.36 - 3.74 uIU/mL Final    Hemoglobin A1c 01/03/2018 5.8* 4.2 - 5.6 % Final    Comment: (NOTE)  HbA1C Interpretive Ranges  <5.7              Normal  5.7 - 6.4         Consider Prediabetes  >6.5              Consider Diabetes      Est. average glucose 01/03/2018 120  mg/dL Final    Comment: (NOTE)  The eAG should be interpreted with patient characteristics in mind   since ethnicity, interindividual differences, red cell lifespan,   variation in rates of glycation, etc. may affect the validity of the   calculation.          TESTS  Results for orders placed or performed during the hospital encounter of 01/03/18   LIPID PANEL   Result Value Ref Range    LIPID PROFILE          Cholesterol, total 207 (H) <200 MG/DL    Triglyceride 98 <150 MG/DL    HDL Cholesterol 60 40 - 60 MG/DL    LDL, calculated 127.4 (H) 0 - 100 MG/DL    VLDL, calculated 19.6 MG/DL    CHOL/HDL Ratio 3.5 0 - 5.0     CBC WITH AUTOMATED DIFF   Result Value Ref Range    WBC 6.8 4.6 - 13.2 K/uL    RBC 4.69 4.20 - 5.30 M/uL    HGB 13.6 12.0 - 16.0 g/dL    HCT 42.4 35.0 - 45.0 %    MCV 90.4 74.0 - 97.0 FL    MCH 29.0 24.0 - 34.0 PG    MCHC 32.1 31.0 - 37.0 g/dL    RDW 13.5 11.6 - 14.5 %    PLATELET 874 472 - 281 K/uL    MPV 10.9 9.2 - 11.8 FL    NEUTROPHILS 60 40 - 73 %    LYMPHOCYTES 31 21 - 52 %    MONOCYTES 6 3 - 10 %    EOSINOPHILS 3 0 - 5 %    BASOPHILS 0 0 - 2 %    ABS. NEUTROPHILS 4.1 1.8 - 8.0 K/UL    ABS. LYMPHOCYTES 2.1 0.9 - 3.6 K/UL    ABS. MONOCYTES 0.4 0.05 - 1.2 K/UL    ABS. EOSINOPHILS 0.2 0.0 - 0.4 K/UL    ABS. BASOPHILS 0.0 0.0 - 0.06 K/UL    DF AUTOMATED     METABOLIC PANEL, COMPREHENSIVE   Result Value Ref Range    Sodium 141 136 - 145 mmol/L    Potassium 4.2 3.5 - 5.5 mmol/L    Chloride 105 100 - 108 mmol/L    CO2 27 21 - 32 mmol/L    Anion gap 9 3.0 - 18 mmol/L    Glucose 106 (H) 74 - 99 mg/dL    BUN 11 7.0 - 18 MG/DL    Creatinine 0.67 0.6 - 1.3 MG/DL    BUN/Creatinine ratio 16 12 - 20      GFR est AA >60 >60 ml/min/1.73m2    GFR est non-AA >60 >60 ml/min/1.73m2    Calcium 8.5 8.5 - 10.1 MG/DL    Bilirubin, total 0.3 0.2 - 1.0 MG/DL    ALT (SGPT) 29 13 - 56 U/L    AST (SGOT) 15 15 - 37 U/L    Alk. phosphatase 110 45 - 117 U/L    Protein, total 6.8 6.4 - 8.2 g/dL    Albumin 3.5 3.4 - 5.0 g/dL    Globulin 3.3 2.0 - 4.0 g/dL    A-G Ratio 1.1 0.8 - 1.7     TSH 3RD GENERATION   Result Value Ref Range    TSH 1.16 0.36 - 3.74 uIU/mL   HEMOGLOBIN A1C WITH EAG   Result Value Ref Range    Hemoglobin A1c 5.8 (H) 4.2 - 5.6 %    Est. average glucose 120 mg/dL       Assessment/Plan:    1. Weight loss  ***  - phentermine (ADIPEX-P) 37.5 mg tablet; Take 1 Tab by mouth every morning. Max Daily Amount: 37.5 mg. Indications: WEIGHT LOSS MANAGEMENT FOR OBESE PATIENT (BMI >= 30)  Dispense: 30 Tab; Refill: 0  - phentermine (ADIPEX-P) 37.5 mg tablet; Take 1 Tab by mouth every morning. Max Daily Amount: 37.5 mg.  Dispense: 30 Tab; Refill: 0      {control:10402}    Lab review: {lab reviewed:895109}    I have discussed the diagnosis with the patient and the intended plan as seen in the above orders. The patient has received an after-visit summary and questions were answered concerning future plans. I have discussed medication side effects and warnings with the patient as well. I have reviewed the plan of care with the patient, accepted their input and they are in agreement with the treatment goals. Follow-up Disposition:  Return in about 2 months (around 4/2/2018). More than 1/2 of this *** minute visit was spent in counselling and coordination of care, as described above because ***.       Horacio Funk MD

## 2018-04-02 NOTE — TELEPHONE ENCOUNTER
Dr. Jenn Cerrato, please see refill request for patient, thank you! Requested Prescriptions     Pending Prescriptions Disp Refills    lisinopril (PRINIVIL, ZESTRIL) 20 mg tablet 30 Tab 2     Sig: Take 1 Tab by mouth daily.  albuterol (PROVENTIL HFA, VENTOLIN HFA, PROAIR HFA) 90 mcg/actuation inhaler 1 Inhaler 2     Sig: Take 1 Puff by inhalation every four (4) hours as needed for Wheezing.

## 2018-04-02 NOTE — TELEPHONE ENCOUNTER
Pt calling to request medication refill of:    Requested Prescriptions     Pending Prescriptions Disp Refills    lisinopril (PRINIVIL, ZESTRIL) 20 mg tablet 30 Tab 2     Sig: Take 1 Tab by mouth daily.  albuterol (PROVENTIL HFA, VENTOLIN HFA, PROAIR HFA) 90 mcg/actuation inhaler 1 Inhaler 2     Sig: Take 1 Puff by inhalation every four (4) hours as needed for Wheezing. be sent to [facility]. Pt has about 4 tabs remaining. Pts last appt was 02/02/18. Advised pt of 72 hour time frame for refill requests. Please advise.

## 2018-04-03 RX ORDER — ALBUTEROL SULFATE 90 UG/1
1 AEROSOL, METERED RESPIRATORY (INHALATION)
Qty: 1 INHALER | Refills: 2 | Status: SHIPPED | OUTPATIENT
Start: 2018-04-03 | End: 2019-10-15 | Stop reason: SDUPTHER

## 2018-04-03 RX ORDER — LISINOPRIL 20 MG/1
20 TABLET ORAL DAILY
Qty: 30 TAB | Refills: 2 | Status: SHIPPED | OUTPATIENT
Start: 2018-04-03 | End: 2018-05-14

## 2018-05-14 ENCOUNTER — OFFICE VISIT (OUTPATIENT)
Dept: FAMILY MEDICINE CLINIC | Age: 33
End: 2018-05-14

## 2018-05-14 VITALS
BODY MASS INDEX: 50.02 KG/M2 | HEART RATE: 85 BPM | TEMPERATURE: 98 F | DIASTOLIC BLOOD PRESSURE: 84 MMHG | HEIGHT: 64 IN | RESPIRATION RATE: 16 BRPM | WEIGHT: 293 LBS | OXYGEN SATURATION: 99 % | SYSTOLIC BLOOD PRESSURE: 132 MMHG

## 2018-05-14 DIAGNOSIS — J30.2 SEASONAL ALLERGIC RHINITIS, UNSPECIFIED TRIGGER: Primary | ICD-10-CM

## 2018-05-14 RX ORDER — MONTELUKAST SODIUM 10 MG/1
10 TABLET ORAL DAILY
Qty: 30 TAB | Refills: 2 | Status: SHIPPED | OUTPATIENT
Start: 2018-05-14 | End: 2019-04-22 | Stop reason: SDUPTHER

## 2018-05-14 RX ORDER — LAMOTRIGINE 100 MG/1
TABLET ORAL DAILY
COMMUNITY
End: 2019-10-15

## 2018-05-14 RX ORDER — OLOPATADINE HYDROCHLORIDE 1 MG/ML
2 SOLUTION/ DROPS OPHTHALMIC 2 TIMES DAILY
Qty: 5 ML | Refills: 2 | Status: SHIPPED | OUTPATIENT
Start: 2018-05-14 | End: 2018-05-31 | Stop reason: CLARIF

## 2018-05-14 RX ORDER — ACETAMINOPHEN AND CODEINE PHOSPHATE 120; 12 MG/5ML; MG/5ML
SOLUTION ORAL
Refills: 3 | COMMUNITY
Start: 2018-04-30 | End: 2021-04-15

## 2018-05-14 NOTE — PROGRESS NOTES
Vesta Bullard is a 26 y.o. female presents to office for Allergies         Health Maintenance items with a due date reviewed with patient:  Health Maintenance Due   Topic Date Due    Pneumococcal 19-64 Medium Risk (1 of 1 - PPSV23) 01/26/2002    DTaP/Tdap/Td series (1 - Tdap) 01/26/2004    PAP AKA CERVICAL CYTOLOGY  01/26/2004

## 2018-05-14 NOTE — MR AVS SNAPSHOT
[facility] 
 
 
 [address] [facility] [address] [facility] [address] 
[phone] Patient: Lacie Mai MRN: [id number] :1985 Visit Information Date & Time Provider Department Dept. Phone Encounter #  
 2018  1:30 PM Yoni Mantilla, [address] [facility] [phone] [id number] Upcoming Health Maintenance Date Due Pneumococcal 19-64 Medium Risk (1 of 1 - PPSV23) 2002 DTaP/Tdap/Td series (1 - Tdap) 2004 PAP AKA CERVICAL CYTOLOGY 2004 Influenza Age 5 to Adult 2018 Allergies as of 2018  Review Complete On: 2018 By: Burak Rich LPN Severity Noted Reaction Type Reactions Other Plant, Animal, Environmental  2018    Other (comments) Current Immunizations  Never Reviewed Name Date Influenza Vaccine 10/1/2014 Not reviewed this visit You Were Diagnosed With   
  
 Codes Comments Seasonal allergic rhinitis, unspecified trigger    -  Primary ICD-10-CM: J30.2 ICD-9-CM: 477.9 Vitals BP Pulse Temp Resp Height(growth percentile) Weight(growth percentile) 132/84 (BP 1 Location: Left arm, BP Patient Position: Sitting) 85 98 °F (36.7 °C) (Oral) 16 5' 4\" (1.626 m) 322 lb (146.1 kg) SpO2 BMI OB Status Smoking Status 99% 55.27 kg/m2 Having regular periods Never Smoker Vitals History BMI and BSA Data Body Mass Index Body Surface Area  
 55.27 kg/m 2 2.57 m 2 Preferred Pharmacy Pharmacy Name Phone [address], [address] [facility] AT [address] [facility] [address] [phone] Your Updated Medication List  
  
   
This list is accurate as of 18  2:41 PM.  Always use your most recent med list.  
  
  
  
  
 albuterol 90 mcg/actuation inhaler Commonly known as:  PROVENTIL HFA, VENTOLIN HFA, PROAIR HFA  
 Take 1 Puff by inhalation every four (4) hours as needed for Wheezing. fluticasone 27.5 mcg/actuation nasal spray Commonly known as:  VERAMYST  
2 Sprays by Nasal route daily. LaMICtal 100 mg tablet Generic drug:  lamoTRIgine Take  by mouth daily. montelukast 10 mg tablet Commonly known as:  SINGULAIR Take 1 Tab by mouth daily. norethindrone 0.35 mg Tab Commonly known as:  Roberto & Roberto TK 1 T PO QD  
  
 olopatadine 0.1 % ophthalmic solution Commonly known as:  PATANOL Administer 2 Drops to both eyes two (2) times a day. Prescriptions Sent to Pharmacy Refills  
 montelukast (SINGULAIR) 10 mg tablet 2 Sig: Take 1 Tab by mouth daily. Class: Normal  
 Pharmacy: [facility] [address] [place] [address], [address] [place] [address] DR AT [address] Ph #: [phone] Route: Oral  
 fluticasone (VERAMYST) 27.5 mcg/actuation nasal spray 2 Si Sprays by Nasal route daily. Class: Normal  
 Pharmacy: [facility] [address] , [address] [place] [address] DR AT [address] Ph #: [phone] Route: Nasal  
 olopatadine (PATANOL) 0.1 % ophthalmic solution 2 Sig: Administer 2 Drops to both eyes two (2) times a day. Class: Normal  
 Pharmacy: [facility] [address], [address] [place] [address] DR AT [address] Ph #: [phone] Route: Both Eyes Introducing [facility] SERVICES! Dear Tank Ask: Thank you for requesting a PATHEOS account. Our records indicate that you already have an active PATHEOS account. You can access your account anytime at [email or web address] ([email or web address] Did you know that you can access your hospital and ER discharge instructions at any time in PATHEOS? You can also review all of your test results from your hospital stay or ER visit. Additional Information If you have questions, please visit the Frequently Asked Questions section of the [employer] website at [email or web address] Remember, [employer] is NOT to be used for urgent needs. For medical emergencies, dial 911. Now available from your iPhone and Android! Please provide this summary of care documentation to your next provider. Your primary care clinician is listed as Zuleika Brown. If you have any questions after today's visit, please call [phone].

## 2018-05-24 NOTE — TELEPHONE ENCOUNTER
Olopatadine not covered either. Per insurance [employer] 0.25% drops are covered. Requested Prescriptions     Pending Prescriptions Disp Refills    azelastine (ASTELIN) 137 mcg (0.1 %) nasal spray 1 Bottle 1     Si [place] by Both Nostrils route two (2) times a day for 60 days.  alcaftadine (LASTACAFT) 0.25 % drop       Sig: Apply  to eye.

## 2018-05-24 NOTE — TELEPHONE ENCOUNTER
Insurance not covering [facility] for formulary alternatives. Azelastine may be covered. Requested Prescriptions     Pending Prescriptions Disp Refills    azelastine (ASTELIN) 137 mcg (0.1 %) nasal spray 1 Bottle 1     Si [place] by Both Nostrils route two (2) times a day for 60 days.

## 2018-05-31 RX ORDER — AZELASTINE 1 MG/ML
1 SPRAY, METERED NASAL 2 TIMES DAILY
Qty: 1 BOTTLE | Refills: 1 | Status: SHIPPED | OUTPATIENT
Start: 2018-05-31 | End: 2019-04-22

## 2018-06-01 NOTE — PROGRESS NOTES
HISTORY OF PRESENT ILLNESS  Jacqueline Goode is a 26 y.o. female going to [place] with seasonal allergies. She has had worsening allergy symptoms over the last month. She has tried 2 different antihistamines for at least several weeks each. Both of only given minimal relief of the sneezing, rhinorrhea and itchy eyes  Allergies   The history is provided by the patient. This is a recurrent problem. The problem occurs daily. The problem has been gradually worsening. Pertinent negatives include no headaches and no shortness of breath. Nothing aggravates the symptoms. Nothing relieves the symptoms. Past Medical History:   Diagnosis Date    Anxiety     Asthma     Depression     Hypertension      Current Outpatient Prescriptions on File Prior to Visit   Medication Sig Dispense Refill    albuterol (PROVENTIL HFA, VENTOLIN HFA, PROAIR HFA) 90 mcg/actuation inhaler Take 1 Puff by inhalation every four (4) hours as needed for Wheezing. 1 Inhaler 2     No current facility-administered medications on file prior to visit. Allergies   Allergen Reactions    Other Plant, Animal, Environmental Other (comments)       Review of Systems   Constitutional: Negative for chills and fever. HENT: Positive for congestion. Negative for ear pain and sore throat. Respiratory: Negative for cough and shortness of breath. Neurological: Negative for headaches. Objective  Visit Vitals    /84 (BP 1 Location: Left arm, BP Patient Position: Sitting)    Pulse 85    Temp 98 °F (36.7 °C) (Oral)    Resp 16    Ht 5' 4\" (1.626 m)    Wt 322 lb (146.1 kg)    SpO2 99%    BMI 55.27 kg/m2     Physical Exam   Constitutional: She appears well-nourished. No distress. HENT:   Right Ear: External ear normal.   Left Ear: External ear normal.   Mouth/Throat: Oropharynx is clear and moist.   Mildly swollen turbinates bilaterally   Neck: Neck supple. Cardiovascular: Normal rate and regular rhythm.     No murmur heard.  Pulmonary/Chest: Effort normal and breath sounds normal.   Lymphadenopathy:     She has no cervical adenopathy. ASSESSMENT and PLAN    ICD-10-CM ICD-9-CM    1. Seasonal allergic rhinitis, unspecified trigger J30.2 477.9      Patient started on Singulair and given eyedrops to lessen or resolve the symptoms. She is to discontinue the other allergy medication she was on. Questions answered and patient verbalized understanding and agreed with plan.

## 2018-06-28 RX ORDER — LISINOPRIL 20 MG/1
TABLET ORAL
Qty: 30 TAB | Refills: 0 | Status: SHIPPED | OUTPATIENT
Start: 2018-06-28 | End: 2018-08-05 | Stop reason: SDUPTHER

## 2018-08-07 RX ORDER — LISINOPRIL 20 MG/1
TABLET ORAL
Qty: 30 TAB | Refills: 0 | Status: SHIPPED | OUTPATIENT
Start: 2018-08-07 | End: 2018-09-09 | Stop reason: SDUPTHER

## 2018-09-10 RX ORDER — LISINOPRIL 20 MG/1
TABLET ORAL
Qty: 90 TAB | Refills: 0 | Status: SHIPPED | OUTPATIENT
Start: 2018-09-10 | End: 2018-12-03 | Stop reason: SDUPTHER

## 2018-12-03 ENCOUNTER — OFFICE VISIT (OUTPATIENT)
Dept: FAMILY MEDICINE CLINIC | Age: 33
End: 2018-12-03

## 2018-12-03 VITALS
BODY MASS INDEX: 50.02 KG/M2 | HEIGHT: 64 IN | SYSTOLIC BLOOD PRESSURE: 136 MMHG | DIASTOLIC BLOOD PRESSURE: 94 MMHG | HEART RATE: 74 BPM | WEIGHT: 293 LBS | RESPIRATION RATE: 16 BRPM | OXYGEN SATURATION: 96 % | TEMPERATURE: 97.8 F

## 2018-12-03 DIAGNOSIS — Z23 ENCOUNTER FOR IMMUNIZATION: ICD-10-CM

## 2018-12-03 DIAGNOSIS — I10 ESSENTIAL HYPERTENSION: Primary | ICD-10-CM

## 2018-12-03 DIAGNOSIS — E66.01 OBESITY, MORBID (HCC): ICD-10-CM

## 2018-12-03 RX ORDER — METHYLPHENIDATE HYDROCHLORIDE 10 MG/1
TABLET ORAL 2 TIMES DAILY
COMMUNITY
End: 2018-12-27

## 2018-12-03 RX ORDER — LISINOPRIL 20 MG/1
TABLET ORAL
Qty: 90 TAB | Refills: 0 | Status: SHIPPED | OUTPATIENT
Start: 2018-12-03 | End: 2019-05-08 | Stop reason: SDUPTHER

## 2018-12-03 NOTE — PROGRESS NOTES
Subjective:     César Chaudhry is a 26 y.o. female who presents for follow up of hypertension. Diet and Lifestyle: not attempting to follow a low fat, low cholesterol diet, not attempting to follow a low sodium diet  Home BP Monitoring: is not measured at home    Cardiovascular ROS: taking medications as instructed, no medication side effects noted, no TIA's, no chest pain on exertion, no dyspnea on exertion, no swelling of ankles. New concerns: none. Current Outpatient Medications   Medication Sig Dispense Refill    methylphenidate HCl (RITALIN) 10 mg tablet Take  by mouth two (2) times a day.  lisinopril (PRINIVIL, ZESTRIL) 20 mg tablet TAKE 1 TABLET BY MOUTH EVERY DAY 90 Tab 0    norethindrone (MICRONOR) 0.35 mg tab TK 1 T PO QD  3    montelukast (SINGULAIR) 10 mg tablet Take 1 Tab by mouth daily. 30 Tab 2    albuterol (PROVENTIL HFA, VENTOLIN HFA, PROAIR HFA) 90 mcg/actuation inhaler Take 1 Puff by inhalation every four (4) hours as needed for Wheezing. 1 Inhaler 2    azelastine (ASTELIN) 137 mcg (0.1 %) nasal spray 1 Rand by Both Nostrils route two (2) times a day for 60 days. 1 Bottle 1    alcaftadine (LASTACAFT) 0.25 % drop Apply 1 drop in affected eye(s) daily 3 mL 1    lamoTRIgine (LAMICTAL) 100 mg tablet Take  by mouth daily.  fluticasone (VERAMYST) 27.5 mcg/actuation nasal spray 2 Sprays by Nasal route daily.  1 Bottle 2     Allergies   Allergen Reactions    Other Plant, Animal, Environmental Other (comments)     Past Medical History:   Diagnosis Date    Anxiety     Asthma     Depression     Hypertension         Lab Results   Component Value Date/Time    Sodium 141 01/03/2018 11:05 AM    Potassium 4.2 01/03/2018 11:05 AM    Chloride 105 01/03/2018 11:05 AM    CO2 27 01/03/2018 11:05 AM    Anion gap 9 01/03/2018 11:05 AM    Glucose 106 (H) 01/03/2018 11:05 AM    BUN 11 01/03/2018 11:05 AM    Creatinine 0.67 01/03/2018 11:05 AM    BUN/Creatinine ratio 16 01/03/2018 11:05 AM GFR est AA >60 01/03/2018 11:05 AM    GFR est non-AA >60 01/03/2018 11:05 AM    Calcium 8.5 01/03/2018 11:05 AM    Bilirubin, total 0.3 01/03/2018 11:05 AM    ALT (SGPT) 29 01/03/2018 11:05 AM    AST (SGOT) 15 01/03/2018 11:05 AM    Alk. phosphatase 110 01/03/2018 11:05 AM    Protein, total 6.8 01/03/2018 11:05 AM    Albumin 3.5 01/03/2018 11:05 AM    Globulin 3.3 01/03/2018 11:05 AM    A-G Ratio 1.1 01/03/2018 11:05 AM      Lab Results   Component Value Date/Time    Hemoglobin A1c 5.8 (H) 01/03/2018 11:05 AM         Review of Systems, additional:  Pertinent items are noted in HPI. Objective:     Visit Vitals  BP (!) 136/94 (BP 1 Location: Right arm, BP Patient Position: Sitting)   Pulse 74   Temp 97.8 °F (36.6 °C) (Oral)   Resp 16   Ht 5' 4\" (1.626 m)   Wt 315 lb 12.8 oz (143.2 kg)   LMP 11/20/2018 (Exact Date)   SpO2 96%   Breastfeeding? No   BMI 54.21 kg/m²     Appearance: alert, well appearing, and in no distress. General exam: CVS exam BP noted to be mildly elevated today in office, S1, S2 normal, no gallop, no murmur, chest clear, no JVD, no HSM, no edema. Lab review: orders written for new lab studies as appropriate; see orders. Assessment/Plan:     hypertension stable. current treatment plan is effective, no change in therapy. ICD-10-CM ICD-9-CM    1. Essential hypertension I10 401.9 lisinopril (PRINIVIL, ZESTRIL) 20 mg tablet   2. Obesity, morbid ([address] E66.01 278.01    3.  Encounter for immunization Z23 V03.89 TETANUS, DIPHTHERIA TOXOIDS AND ACELLULAR PERTUSSIS VACCINE (TDAP), IN INDIVIDS. >=7, IM

## 2018-12-03 NOTE — PROGRESS NOTES
Hamilton Pabon is a 26 y.o. female (: 1985) presenting to address:    Chief Complaint   Patient presents with    New Patient    Hypertension       Vitals:    18 1134   BP: (!) 136/94   Pulse: 74   Resp: 16   Temp: 97.8 °F (36.6 °C)   TempSrc: Oral   SpO2: 96%   Weight: 315 lb 12.8 oz (143.2 kg)   Height: 5' 4\" (1.626 m)   PainSc:   0 - No pain   LMP: 2018       Health Maintenance   Topic Date Due    DTaP/Tdap/Td series (1 - Tdap) 2004    Influenza Age 5 to Adult  2019 (Originally 2018)    Pneumococcal 19-64 Medium Risk (1 of 1 - PPSV23) 2019 (Originally 2002)    PAP AKA CERVICAL CYTOLOGY  2021       Allergies   Allergen Reactions    Other Plant, Animal, Environmental Other (comments)       Medication list and allergies have been reviewed with Hamilton Pabon and updated as of today's date. Last office visit: Visit date not found    Coordination of Care Questionaire:     1. Have you been to the ER, urgent care clinic since your last visit? Hospitalized since your last visit? NO    2. Do you have any medications requiring a refill? YES      Hearing/Vision:   No exam data present    Learning Assessment:     Learning Assessment 2016   PRIMARY LEARNER Patient   HIGHEST LEVEL OF EDUCATION - PRIMARY LEARNER  2 YEARS OF COLLEGE   BARRIERS PRIMARY LEARNER READING   PRIMARY LANGUAGE ENGLISH   LEARNER PREFERENCE PRIMARY DEMONSTRATION   ANSWERED BY Patient    RELATIONSHIP SELF     Depression Screening:     PHQ over the last two weeks 2018   Little interest or pleasure in doing things Not at all   Feeling down, depressed, irritable, or hopeless Not at all   Total Score PHQ 2 0     Fall Risk Assessment:     Fall Risk Assessment, last 12 mths 2017   Able to walk? Yes   Fall in past 12 months? No     Abuse Screening:     Abuse Screening Questionnaire 2018   Do you ever feel afraid of your partner?  N   Are you in a relationship with someone who physically or mentally threatens you? N   Is it safe for you to go home?  Carlos Crisostomo

## 2018-12-03 NOTE — PATIENT INSTRUCTIONS
Vaccine Information Statement     Tdap (Tetanus, Diphtheria, Pertussis) Vaccine: What You Need to Know    Many Vaccine Information Statements are available in [place] and other languages. See www.immunize.org/vis. Hojas de Información Sobre Vacunas están disponibles en español y en muchos otros idiomas. Visite [email or web address]    1. Why get vaccinated? Tetanus, diphtheria, and pertussis are very serious diseases. Tdap vaccine can protect us from these diseases. And, Tdap vaccine given to pregnant women can protect  babies against pertussis. TETANUS (Lockjaw) is rare in the [facility] today. It causes painful muscle tightening and stiffness, usually all over the body.  It can lead to tightening of muscles in the head and neck so you cant open your mouth, swallow, or sometimes even breathe. Tetanus kills about 1 out of 10 people who are infected even after receiving the best medical care. DIPHTHERIA is also rare in the [facility] today. It can cause a thick coating to form in the back of the throat.  It can lead to breathing problems, heart failure, paralysis, and death. PERTUSSIS (Whooping Cough) causes severe coughing spells, which can cause difficulty breathing, vomiting, and disturbed sleep.  It can also lead to weight loss, incontinence, and rib fractures. Up to 2 in 100 adolescents and 5 in 100 adults with pertussis are hospitalized or have complications, which could include pneumonia or death. These diseases are caused by bacteria. Diphtheria and pertussis are spread from person to person through secretions from coughing or sneezing. Tetanus enters the body through cuts, scratches, or wounds. Before vaccines, as many as 200,000 cases of diphtheria, 200,000 cases of pertussis, and hundreds of cases of tetanus, were reported in the [place] each year.  Since vaccination began, reports of cases for tetanus and diphtheria have dropped by about 99% and for pertussis by about 80%. 2. Tdap vaccine    Tdap vaccine can protect adolescents and adults from tetanus, diphtheria, and pertussis. One dose of Tdap is routinely given at age 6 or 15. People who did not get Tdap at that age should get it as soon as possible. Tdap is especially important for health care professionals and anyone having close contact with a baby younger than 12 months. Pregnant women should get a dose of Tdap during every pregnancy, to protect the  from pertussis. Infants are most at risk for severe, life-threatening complications from pertussis. Another vaccine, called Td, protects against tetanus and diphtheria, but not pertussis. A Td booster should be given every 10 years. Tdap may be given as one of these boosters if you have never gotten Tdap before. Tdap may also be given after a severe cut or burn to prevent tetanus infection. Your doctor or the person giving you the vaccine can give you more information. Tdap may safely be given at the same time as other vaccines. 3. Some people should not get this vaccine     A person who has ever had a life-threatening allergic reaction after a previous dose of any diphtheria, tetanus or pertussis containing vaccine, OR has a severe allergy to any part of this vaccine, should not get Tdap vaccine. Tell the person giving the vaccine about any severe allergies.  Anyone who had coma or long repeated seizures within 7 days after a childhood dose of DTP or DTaP, or a previous dose of Tdap, should not get Tdap, unless a cause other than the vaccine was found. They can still get Td.  Talk to your doctor if you:  - have seizures or another nervous system problem,  - had severe pain or swelling after any vaccine containing diphtheria, tetanus or pertussis,   - ever had a condition called Guillain Barré Syndrome (GBS),  - arent feeling well on the day the shot is scheduled.     4. Risks    With any medicine, including vaccines, there is a chance of side effects. These are usually mild and go away on their own. Serious reactions are also possible but are rare. Most people who get Tdap vaccine do not have any problems with it. Mild Problems following Tdap  (Did not interfere with activities)   Pain where the shot was given (about 3 in 4 adolescents or 2 in 3 adults)   Redness or swelling where the shot was given (about 1 person in 5)   Mild fever of at least 100.4°F (up to about 1 in 25 adolescents or 1 in 100 adults)   Headache (about 3 or 4 people in 10)   Tiredness (about 1 person in 3 or 4)   Nausea, vomiting, diarrhea, stomach ache (up to 1 in 4 adolescents or 1 in 10 adults)   Chills,  sore joints (about 1 person in 10)   Body aches (about 1 person in 3 or 4)    Rash, swollen glands (uncommon)    Moderate Problems following Tdap  (Interfered with activities, but did not require medical attention)   Pain where the shot was given (up to 1 in 5 or 6)    Redness or swelling where the shot was given (up to about 1 in 16 adolescents or 1 in 12 adults)   Fever over 102°F (about 1 in 100 adolescents or 1 in 250 adults)   Headache (about 1 in 7 adolescents or 1 in 10 adults)   Nausea, vomiting, diarrhea, stomach ache (up to 1 or 3 people in 100)   Swelling of the entire arm where the shot was given (up to about 1 in 500). Severe Problems following Tdap  (Unable to perform usual activities; required medical attention)   Swelling, severe pain, bleeding, and redness in the arm where the shot was given (rare). Problems that could happen after any vaccine:     People sometimes faint after a medical procedure, including vaccination. Sitting or lying down for about 15 minutes can help prevent fainting, and injuries caused by a fall. Tell your doctor if you feel dizzy, or have vision changes or ringing in the ears.      Some people get severe pain in the shoulder and have difficulty moving the arm where a shot was given. This happens very rarely.  Any medication can cause a severe allergic reaction. Such reactions from a vaccine are very rare, estimated at fewer than 1 in a million doses, and would happen within a few minutes to a few hours after the vaccination. As with any medicine, there is a very remote chance of a vaccine causing a serious injury or death. The safety of vaccines is always being monitored. For more information, visit: www.cdc.gov/vaccinesafety/    5. What if there is a serious problem? What should I look for?  Look for anything that concerns you, such as signs of a severe allergic reaction, very high fever, or unusual behavior.  Signs of a severe allergic reaction can include hives, swelling of the face and throat, difficulty breathing, a fast heartbeat, dizziness, and weakness. These would usually start a few minutes to a few hours after the vaccination. What should I do?  If you think it is a severe allergic reaction or other emergency that cant wait, call 9-1-1 or get the person to the nearest hospital. Otherwise, call your doctor.  Afterward, the reaction should be reported to the Vaccine Adverse Event Reporting System (VAERS). Your doctor might file this report, or you can do it yourself through the VAERS web site at www.vaers. [facility].gov, or by calling 9-672.996.2602. VAERS does not give medical advice. 6. The National Vaccine Injury Compensation Program    The [facility] Vaccine Injury Compensation Program (VICP) is a federal program that was created to compensate people who may have been injured by certain vaccines. Persons who believe they may have been injured by a vaccine can learn about the program and about filing a claim by calling 4-520.951.1536 or visiting the [employer] website at www.[facility].gov/vaccinecompensation. There is a time limit to file a claim for compensation. 7. How can I learn more?  Ask your doctor.  He or she can give you the vaccine package insert or suggest other sources of information.  Call your local or state health department.  Contact the Centers for Disease Control and Prevention (CDC):  - Call 8-336.988.2700 (1-800-CDC-INFO) or  - Visit CDCs website at www.cdc.gov/vaccines      Vaccine Information Statement   Tdap Vaccine  (2/24/2015)  42 JODI Mcclain [id number]    Department of Health and Human Services  Centers for Disease Control and Prevention    Office Use Only

## 2018-12-03 NOTE — PROGRESS NOTES
TDAP Immunization/s administered 12/3/2018 by Aly Goodman LPN with guardian's consent. Patient tolerated procedure well. No reactions noted.

## 2018-12-27 ENCOUNTER — OFFICE VISIT (OUTPATIENT)
Dept: FAMILY MEDICINE CLINIC | Age: 33
End: 2018-12-27

## 2018-12-27 VITALS
TEMPERATURE: 97.5 F | DIASTOLIC BLOOD PRESSURE: 90 MMHG | HEART RATE: 80 BPM | SYSTOLIC BLOOD PRESSURE: 133 MMHG | OXYGEN SATURATION: 98 % | RESPIRATION RATE: 18 BRPM | HEIGHT: 63 IN | WEIGHT: 293 LBS | BODY MASS INDEX: 51.91 KG/M2

## 2018-12-27 DIAGNOSIS — F41.0 ANXIETY ATTACK: ICD-10-CM

## 2018-12-27 DIAGNOSIS — I10 ESSENTIAL HYPERTENSION: Primary | ICD-10-CM

## 2018-12-27 DIAGNOSIS — R60.9 EDEMA, UNSPECIFIED TYPE: ICD-10-CM

## 2018-12-27 DIAGNOSIS — I10 ESSENTIAL HYPERTENSION: ICD-10-CM

## 2018-12-27 RX ORDER — DEXTROAMPHETAMINE SACCHARATE, AMPHETAMINE ASPARTATE, DEXTROAMPHETAMINE SULFATE AND AMPHETAMINE SULFATE 5; 5; 5; 5 MG/1; MG/1; MG/1; MG/1
20 TABLET ORAL
COMMUNITY
End: 2019-10-15

## 2018-12-27 RX ORDER — HYDROXYZINE PAMOATE 25 MG/1
25 CAPSULE ORAL
Qty: 60 CAP | Refills: 1 | Status: SHIPPED | OUTPATIENT
Start: 2018-12-27 | End: 2019-01-10

## 2018-12-27 RX ORDER — FUROSEMIDE 20 MG/1
20 TABLET ORAL
Qty: 30 TAB | Refills: 0 | Status: SHIPPED | OUTPATIENT
Start: 2018-12-27 | End: 2018-12-27 | Stop reason: SDUPTHER

## 2018-12-27 RX ORDER — FUROSEMIDE 20 MG/1
TABLET ORAL
Qty: 90 TAB | Refills: 0 | Status: SHIPPED | OUTPATIENT
Start: 2018-12-27 | End: 2019-03-27 | Stop reason: SDUPTHER

## 2018-12-27 NOTE — PATIENT INSTRUCTIONS
DASH Diet: Care Instructions  Your Care Instructions    The DASH diet is an eating plan that can help lower your blood pressure. DASH stands for Dietary Approaches to Stop Hypertension. Hypertension is high blood pressure. The DASH diet focuses on eating foods that are high in calcium, potassium, and magnesium. These nutrients can lower blood pressure. The foods that are highest in these nutrients are fruits, vegetables, low-fat dairy products, nuts, seeds, and legumes. But taking calcium, potassium, and magnesium supplements instead of eating foods that are high in those nutrients does not have the same effect. The DASH diet also includes whole grains, fish, and poultry. The DASH diet is one of several lifestyle changes your doctor may recommend to lower your high blood pressure. Your doctor may also want you to decrease the amount of sodium in your diet. Lowering sodium while following the DASH diet can lower blood pressure even further than just the DASH diet alone. Follow-up care is a key part of your treatment and safety. Be sure to make and go to all appointments, and call your doctor if you are having problems. It's also a good idea to know your test results and keep a list of the medicines you take. How can you care for yourself at home? Following the DASH diet  · Eat 4 to 5 servings of fruit each day. A serving is 1 medium-sized piece of fruit, ½ cup chopped or canned fruit, 1/4 cup dried fruit, or 4 ounces (½ cup) of fruit juice. Choose fruit more often than fruit juice. · Eat 4 to 5 servings of vegetables each day. A serving is 1 cup of lettuce or raw leafy vegetables, ½ cup of chopped or cooked vegetables, or 4 ounces (½ cup) of vegetable juice. Choose vegetables more often than vegetable juice. · Get 2 to 3 servings of low-fat and fat-free dairy each day. A serving is 8 ounces of milk, 1 cup of yogurt, or 1 ½ ounces of cheese. · Eat 6 to 8 servings of grains each day.  A serving is 1 slice of bread, 1 ounce of dry cereal, or ½ cup of cooked rice, pasta, or cooked cereal. Try to choose whole-grain products as much as possible. · Limit lean meat, poultry, and fish to 2 servings each day. A serving is 3 ounces, about the size of a deck of cards. · Eat 4 to 5 servings of nuts, seeds, and legumes (cooked dried beans, lentils, and split peas) each week. A serving is 1/3 cup of nuts, 2 tablespoons of seeds, or ½ cup of cooked beans or peas. · Limit fats and oils to 2 to 3 servings each day. A serving is 1 teaspoon of vegetable oil or 2 tablespoons of salad dressing. · Limit sweets and added sugars to 5 servings or less a week. A serving is 1 tablespoon jelly or jam, ½ cup sorbet, or 1 cup of lemonade. · Eat less than 2,300 milligrams (mg) of sodium a day. If you limit your sodium to 1,500 mg a day, you can lower your blood pressure even more. Tips for success  · Start small. Do not try to make dramatic changes to your diet all at once. You might feel that you are missing out on your favorite foods and then be more likely to not follow the plan. Make small changes, and stick with them. Once those changes become habit, add a few more changes. · Try some of the following:  ? Make it a goal to eat a fruit or vegetable at every meal and at snacks. This will make it easy to get the recommended amount of fruits and vegetables each day. ? Try yogurt topped with fruit and nuts for a snack or healthy dessert. ? Add lettuce, tomato, cucumber, and onion to sandwiches. ? Combine a ready-made pizza crust with low-fat mozzarella cheese and lots of vegetable toppings. Try using tomatoes, squash, spinach, broccoli, carrots, cauliflower, and onions. ? Have a variety of cut-up vegetables with a low-fat dip as an appetizer instead of chips and dip. ? Sprinkle sunflower seeds or chopped almonds over salads. Or try adding chopped walnuts or almonds to cooked vegetables.   ? Try some vegetarian meals using beans and peas. Add garbanzo or kidney beans to salads. Make burritos and tacos with mashed marion beans or black beans. Where can you learn more? Go to http://evelin-radha.info/. Enter C446 in the search box to learn more about \"DASH Diet: Care Instructions. \"  Current as of: December 6, 2017  Content Version: 11.8  © 4717-6111 [employer]. Care instructions adapted under license by [employer] (which disclaims liability or warranty for this information). If you have questions about a medical condition or this instruction, always ask your healthcare professional. [place] [address] any warranty or liability for your use of this information. Panic Attacks: Care Instructions  Your Care Instructions    During a panic attack, you may have a feeling of intense fear or terror, trouble breathing, chest pain or tightness, heartbeat changes, dizziness, sweating, and shaking. A panic attack starts suddenly and usually lasts from 5 to 20 minutes but may last even longer. You have the most anxiety about 10 minutes after the attack starts. An attack can begin with a stressful event, or it can happen without a cause. Although panic attacks can cause scary symptoms, you can learn to manage them with self-care, counseling, and medicine. Follow-up care is a key part of your treatment and safety. Be sure to make and go to all appointments, and call your doctor if you are having problems. It's also a good idea to know your test results and keep a list of the medicines you take. How can you care for yourself at home? · Take your medicine exactly as directed. Call your doctor if you think you are having a problem with your medicine. · Go to your counseling sessions and follow-up appointments. · Recognize and accept your anxiety. Then, when you are in a situation that makes you anxious, say to yourself, \"This is not an emergency.  I feel uncomfortable, but I am not in danger. I can keep going even if I feel anxious. \"  · Be kind to your body:  ? Relieve tension with exercise or a massage. ? Get enough rest.  ? Avoid alcohol, caffeine, nicotine, and illegal drugs. They can increase your anxiety level, cause sleep problems, or trigger a panic attack. ? Learn and do relaxation techniques. See below for more about these techniques. · Engage your mind. Get out and do something you enjoy. Go to a funny movie, or take a walk or hike. Plan your day. Having too much or too little to do can make you anxious. · Keep a record of your symptoms. Discuss your fears with a good friend or family member, or join a support group for people with similar problems. Talking to others sometimes relieves stress. · Get involved in social groups, or volunteer to help others. Being alone sometimes makes things seem worse than they are. · Get at least 30 minutes of exercise on most days of the week to relieve stress. Walking is a good choice. You also may want to do other activities, such as running, swimming, cycling, or playing tennis or team sports. Relaxation techniques  Do relaxation exercises for 10 to 20 minutes a day. You can play soothing, relaxing music while you do them, if you wish. · Tell others in your house that you are going to do your relaxation exercises. Ask them not to disturb you. · Find a comfortable place, away from all distractions and noise. · Lie down on your back, or sit with your back straight. · Focus on your breathing. Make it slow and steady. · Breathe in through your nose. Breathe out through either your nose or mouth. · Breathe deeply, filling up the area between your navel and your rib cage. Breathe so that your belly goes up and down. · Do not hold your breath. · Breathe like this for 5 to 10 minutes. Notice the feeling of calmness throughout your whole body.   As you continue to breathe slowly and deeply, relax by doing the following for another 5 to 10 minutes:  · Tighten and relax each muscle group in your body. You can begin at your toes and work your way up to your head. · Imagine your muscle groups relaxing and becoming heavy. · Empty your mind of all thoughts. · Let yourself relax more and more deeply. · Become aware of the state of calmness that surrounds you. · When your relaxation time is over, you can bring yourself back to alertness by moving your fingers and toes and then your hands and feet and then stretching and moving your entire body. Sometimes people fall asleep during relaxation, but they usually wake up shortly afterward. · Always give yourself time to return to full alertness before you drive a car or do anything that might cause an accident if you are not fully alert. Never play a relaxation tape while driving a car. When should you call for help? Call 911 anytime you think you may need emergency care. For example, call if:    · You feel you cannot stop from hurting yourself or someone else.    Watch closely for changes in your health, and be sure to contact your doctor if:    · Your panic attacks get worse.     · You have new or different anxiety.     · You are not getting better as expected. Where can you learn more? Go to http://evelin-radha.info/. Enter H601 in the search box to learn more about \"Panic Attacks: Care Instructions. \"  Current as of: December 7, 2017  Content Version: 11.8  © 9848-5076 [employer]. Care instructions adapted under license by [employer] (which disclaims liability or warranty for this information). If you have questions about a medical condition or this instruction, always ask your healthcare professional. [place] [address] any warranty or liability for your use of this information.

## 2018-12-27 NOTE — PROGRESS NOTES
Destinee Conway is a 26 y.o. female new pt establishing care. She was going to the [facility], [facility]. location but her doctor kept getting changed because they keep leaving the practice. She is here today for her HTN and she has anxiety about it, thinking she is going to have a heart attack. She knows she needs to lose weight but does not want bariatric surgery. She has been on phentermine before but can't take that due to her HTN. She does see a provider for anxiety, Dr. Adrien Wright, she is taking adderall and was on a mood stabilizer but cannot take it right now as she currently trying to get pregnant.

## 2019-02-11 ENCOUNTER — HOSPITAL ENCOUNTER (OUTPATIENT)
Dept: LAB | Age: 34
Discharge: HOME | End: 2019-02-11
Payer: COMMERCIAL

## 2019-02-11 ENCOUNTER — OFFICE VISIT (OUTPATIENT)
Dept: FAMILY MEDICINE CLINIC | Age: 34
End: 2019-02-11

## 2019-02-11 VITALS
BODY MASS INDEX: 51.91 KG/M2 | HEART RATE: 90 BPM | RESPIRATION RATE: 18 BRPM | HEIGHT: 63 IN | WEIGHT: 293 LBS | OXYGEN SATURATION: 98 % | DIASTOLIC BLOOD PRESSURE: 65 MMHG | TEMPERATURE: 98.6 F | SYSTOLIC BLOOD PRESSURE: 142 MMHG

## 2019-02-11 DIAGNOSIS — I10 ESSENTIAL HYPERTENSION: ICD-10-CM

## 2019-02-11 DIAGNOSIS — Z32.01 PREGNANCY CONFIRMED BY POSITIVE BLOOD TEST: ICD-10-CM

## 2019-02-11 DIAGNOSIS — Z32.00 POSSIBLE PREGNANCY, NOT CONFIRMED: Primary | ICD-10-CM

## 2019-02-11 DIAGNOSIS — Z32.00 POSSIBLE PREGNANCY, NOT CONFIRMED: ICD-10-CM

## 2019-02-11 LAB — HCG SERPL QL: POSITIVE

## 2019-02-11 PROCEDURE — 84703 CHORIONIC GONADOTROPIN ASSAY: CPT

## 2019-02-11 RX ORDER — METHYLDOPA 250 MG/1
250 TABLET, FILM COATED ORAL 3 TIMES DAILY
Qty: 90 TAB | Refills: 0 | Status: SHIPPED | OUTPATIENT
Start: 2019-02-11 | End: 2019-02-11 | Stop reason: SDUPTHER

## 2019-02-14 ENCOUNTER — TELEPHONE (OUTPATIENT)
Dept: FAMILY MEDICINE CLINIC | Age: 34
End: 2019-02-14

## 2019-02-14 RX ORDER — METHYLDOPA 250 MG/1
TABLET, FILM COATED ORAL
Qty: 270 TAB | Refills: 0 | Status: SHIPPED | OUTPATIENT
Start: 2019-02-14 | End: 2019-02-14 | Stop reason: SDUPTHER

## 2019-02-14 RX ORDER — METHYLDOPA 250 MG/1
TABLET, FILM COATED ORAL
Qty: 270 TAB | Refills: 0
Start: 2019-02-14 | End: 2019-04-22

## 2019-02-14 NOTE — TELEPHONE ENCOUNTER
Pt returned call, she was given her results of positive pregnancy test, she voiced understanding and would like her OBGYN referral to be sent to [facility].

## 2019-02-14 NOTE — PROGRESS NOTES
Patient: Carrol Stark  Date of Service: 2/14/2019   Provider: MARU Wolf     REASON FOR VISIT:   Chief Complaint   Patient presents with    Medication Evaluation    Hypertension        HISTORY OF PRESENT ILLNESS:   Carrol Stark is a 37 y.o. female who presents to the office for acute care. She present because home pregnancy test was positive and she wants her blood pressure medication changed. She stopped taking all her medication. Has a history of 2 miscarriages. Has no complaints today. ALLERGIES:   Allergies   Allergen Reactions    Other Plant, Animal, Environmental Other (comments)        ACTIVE MEDICAL PROBLEMS:  Patient Active Problem List   Diagnosis Code    Essential hypertension I10    Asthma J45.909    Anxiety F41.9    Chronic bilateral low back pain without sciatica M54.5, G89.29    Chest wall pain R07.89    Obesity, morbid ([address] E66.01        MEDICATIONS:   Current Outpatient Medications   Medication Sig Dispense Refill    PNV NR.50/ZMKGPKR fum/folic ac (PRENATAL PO) Take  by mouth.  FOLIC ACID PO Take  by mouth.  albuterol (PROVENTIL HFA, VENTOLIN HFA, PROAIR HFA) 90 mcg/actuation inhaler Take 1 Puff by inhalation every four (4) hours as needed for Wheezing. 1 Inhaler 2    methyldopa (ALDOMET) 250 mg tablet TAKE 1 TABLET BY MOUTH THREE TIMES DAILY 270 Tab 0    dextroamphetamine-amphetamine (ADDERALL) 20 mg tablet Take 20 mg by mouth.  furosemide (LASIX) 20 mg tablet TAKE 1 TABLET BY MOUTH DAILY AS NEEDED 90 Tab 0    lisinopril (PRINIVIL, ZESTRIL) 20 mg tablet TAKE 1 TABLET BY MOUTH EVERY DAY 90 Tab 0    azelastine (ASTELIN) 137 mcg (0.1 %) nasal spray 1 [place] by Both Nostrils route two (2) times a day for 60 days. 1 Bottle 1    lamoTRIgine (LAMICTAL) 100 mg tablet Take  by mouth daily.  norethindrone (MICRONOR) 0.35 mg tab TK 1 T PO QD  3    montelukast (SINGULAIR) 10 mg tablet Take 1 Tab by mouth daily.  30 Tab 2    fluticasone (VERAMYST) 27.5 mcg/actuation nasal spray 2 Sprays by Nasal route daily. 1 Bottle 2        ROS:   Pertinent positive as above in HPI. All others negative. PHYSICAL EXAMINATION:  Visit Vitals  /65 (BP 1 Location: Right arm, BP Patient Position: Sitting)   Pulse 90   Temp 98.6 °F (37 °C) (Oral)   Resp 18   Ht 5' 3\" (1.6 m)   Wt 323 lb 12.8 oz (146.9 kg)   LMP 01/12/2019 (Exact Date)   SpO2 98%   BMI 57.36 kg/m²      Body mass index is 57.36 kg/m². Appearance: alert, well appearing, and in no distress, happy. Neurological is normal, no focal findings. Psychiatric: Oriented to time, place and person. Normal mood, no agitation or anxiety. Normal affect. Pleasant and cooperative. ASSESSMENT/PLAN:  Diagnoses and all orders for this visit:    1. Possible pregnancy, not confirmed  -     HCG QL SERUM; Future    2. Essential hypertension  -     methyldopa (ALDOMET) 250 mg tablet; TAKE 1 TABLET BY MOUTH THREE TIMES DAILY    3. Pregnancy confirmed by positive blood test  -     REFERRAL TO OBSTETRICS AND GYNECOLOGY    Patient advised to return to clinic if symptoms persist or to ED if they become worse  Patient verbalized understanding to above instructions. Follow-up Disposition:  Return if symptoms worsen or fail to improve.      MARU Valentine   2/14/2019   5:00 PM

## 2019-03-10 DIAGNOSIS — I10 ESSENTIAL HYPERTENSION: ICD-10-CM

## 2019-03-10 RX ORDER — METHYLDOPA 250 MG/1
TABLET, FILM COATED ORAL
Qty: 90 TAB | Refills: 0 | Status: SHIPPED | OUTPATIENT
Start: 2019-03-10 | End: 2019-04-22

## 2019-04-08 DIAGNOSIS — I10 ESSENTIAL HYPERTENSION: ICD-10-CM

## 2019-04-17 RX ORDER — METHYLDOPA 250 MG/1
TABLET, FILM COATED ORAL
Qty: 90 TAB | Refills: 0 | OUTPATIENT
Start: 2019-04-17

## 2019-04-18 ENCOUNTER — TELEPHONE (OUTPATIENT)
Dept: FAMILY MEDICINE CLINIC | Age: 34
End: 2019-04-18

## 2019-04-18 NOTE — TELEPHONE ENCOUNTER
Called patient to clarify request of medication but she was not there. Left message to call the office back in the morning. I believe that OB/GYN is taking care of of BP medication.  If it is not the case, the nurse will call the following prescription to the pharmacy in the morning:     methyldopa (ALDOMET) 250 mg tablet    Sig: TAKE 1 TABLET BY MOUTH THREE TIMES DAILY    Disp:  90 Tab    Refills:  3    For: Essential hypertension

## 2019-04-18 NOTE — TELEPHONE ENCOUNTER
Please see encounter from 04/08/2019 from [facility]. Pt called today and spoke with PSR, stating that she had a recent miscarriage. Her OB will be doing tests to find out what is going on. She was originally put on the methyldopa in place of Lisinopril because there was concern that the Lisinopril was causing her miscarriages. After testing in done, if the medication is not the cause she would like to go back on the Lisinopril, otherwise she will stay on the methyldopa. For now, a refill was called in to her pharmacy for the methyldopa, 90tabs, 3 refills. Pt will keep us informed with what is going on.

## 2019-04-22 ENCOUNTER — OFFICE VISIT (OUTPATIENT)
Dept: FAMILY MEDICINE CLINIC | Age: 34
End: 2019-04-22

## 2019-04-22 VITALS
HEIGHT: 63 IN | BODY MASS INDEX: 51.91 KG/M2 | WEIGHT: 293 LBS | DIASTOLIC BLOOD PRESSURE: 93 MMHG | RESPIRATION RATE: 16 BRPM | HEART RATE: 70 BPM | TEMPERATURE: 97.8 F | OXYGEN SATURATION: 96 % | SYSTOLIC BLOOD PRESSURE: 140 MMHG

## 2019-04-22 DIAGNOSIS — F41.0 PANIC ATTACKS: ICD-10-CM

## 2019-04-22 DIAGNOSIS — F32.2 DEPRESSION, MAJOR, SINGLE EPISODE, SEVERE (HCC): ICD-10-CM

## 2019-04-22 DIAGNOSIS — J30.2 SEASONAL ALLERGIC RHINITIS, UNSPECIFIED TRIGGER: ICD-10-CM

## 2019-04-22 DIAGNOSIS — I10 ESSENTIAL HYPERTENSION: Primary | ICD-10-CM

## 2019-04-22 RX ORDER — BUSPIRONE HYDROCHLORIDE 5 MG/1
5 TABLET ORAL
Qty: 90 TAB | Refills: 0 | Status: SHIPPED | OUTPATIENT
Start: 2019-04-22 | End: 2019-05-08 | Stop reason: SDUPTHER

## 2019-04-22 RX ORDER — BUPROPION HYDROCHLORIDE 75 MG/1
75 TABLET ORAL 2 TIMES DAILY
Qty: 60 TAB | Refills: 0 | Status: SHIPPED | OUTPATIENT
Start: 2019-04-22 | End: 2019-05-08 | Stop reason: SDUPTHER

## 2019-04-22 RX ORDER — MONTELUKAST SODIUM 10 MG/1
10 TABLET ORAL DAILY
Qty: 30 TAB | Refills: 2 | Status: SHIPPED | OUTPATIENT
Start: 2019-04-22 | End: 2019-04-22 | Stop reason: SDUPTHER

## 2019-04-22 NOTE — LETTER
NOTIFICATION RETURN TO WORK / SCHOOL 
 
4/22/2019 10:27 AM 
 
Ms. Romy [address] [address] To Whom It May Concern: 
 
Romy [address] is currently under the care of Feroz Valles. She will return to work  On: Tuesday, April 23, 2019. If there are questions or concerns please have the patient contact our office. Sincerely, MARU Jennings

## 2019-04-22 NOTE — PATIENT INSTRUCTIONS
Allergies: Care Instructions  Your Care Instructions    Allergies occur when your body's defense system (immune system) overreacts to certain substances. The immune system treats a harmless substance as if it were a harmful germ or virus. Many things can cause this overreaction, including pollens, medicine, food, dust, animal dander, and mold. Allergies can be mild or severe. Mild allergies can be managed with home treatment. But medicine may be needed to prevent problems. Managing your allergies is an important part of staying healthy. Your doctor may suggest that you have allergy testing to help find out what is causing your allergies. When you know what things trigger your symptoms, you can avoid them. This can prevent allergy symptoms and other health problems. For severe allergies that cause reactions that affect your whole body (anaphylactic reactions), your doctor may prescribe a shot of epinephrine to carry with you in case you have a severe reaction. Learn how to give yourself the shot and keep it with you at all times. Make sure it is not . Follow-up care is a key part of your treatment and safety. Be sure to make and go to all appointments, and call your doctor if you are having problems. It's also a good idea to know your test results and keep a list of the medicines you take. How can you care for yourself at home? · If you have been told by your doctor that dust or dust mites are causing your allergy, decrease the dust around your bed:  ? Wash sheets, pillowcases, and other bedding in hot water every week. ? Use dust-proof covers for pillows, duvets, and mattresses. Avoid plastic covers because they tear easily and do not \"breathe. \" Wash as instructed on the label. ? Do not use any blankets and pillows that you do not need. ? Use blankets that you can wash in your washing machine. ? Consider removing drapes and carpets, which attract and hold dust, from your bedroom.   · If you are allergic to house dust and mites, do not use home humidifiers. Your doctor can suggest ways you can control dust and mites. · Look for signs of cockroaches. Cockroaches cause allergic reactions. Use cockroach baits to get rid of them. Then, clean your home well. Cockroaches like areas where grocery bags, newspapers, empty bottles, or cardboard boxes are stored. Do not keep these inside your home, and keep trash and food containers sealed. Seal off any spots where cockroaches might enter your home. · If you are allergic to mold, get rid of furniture, rugs, and drapes that smell musty. Check for mold in the bathroom. · If you are allergic to outdoor pollen or mold spores, use air-conditioning. Change or clean all filters every month. Keep windows closed. · If you are allergic to pollen, stay inside when pollen counts are high. Use a vacuum  with a HEPA filter or a double-thickness filter at least two times each week. · Stay inside when air pollution is bad. Avoid paint fumes, perfumes, and other strong odors. · Avoid conditions that make your allergies worse. Stay away from smoke. Do not smoke or let anyone else smoke in your house. Do not use fireplaces or wood-burning stoves. · If you are allergic to your pets, change the air filter in your furnace every month. Use high-efficiency filters. · If you are allergic to pet dander, keep pets outside or out of your bedroom. Old carpet and cloth furniture can hold a lot of animal dander. You may need to replace them. When should you call for help? Give an epinephrine shot if:    · You think you are having a severe allergic reaction.     · You have symptoms in more than one body area, such as mild nausea and an itchy mouth.    After giving an epinephrine shot call 911, even if you feel better.   Call 911 if:    · You have symptoms of a severe allergic reaction. These may include:  ? Sudden raised, red areas (hives) all over your body. ?  Swelling of the throat, mouth, lips, or tongue. ? Trouble breathing. ? Passing out (losing consciousness). Or you may feel very lightheaded or suddenly feel weak, confused, or restless.     · You have been given an epinephrine shot, even if you feel better.    Call your doctor now or seek immediate medical care if:    · You have symptoms of an allergic reaction, such as:  ? A rash or hives (raised, red areas on the skin). ? Itching. ? Swelling. ? Belly pain, nausea, or vomiting.    Watch closely for changes in your health, and be sure to contact your doctor if:    · You do not get better as expected. Where can you learn more? Go to http://evelinSignadyneradha.info/. Enter R495 in the search box to learn more about \"Allergies: Care Instructions. \"  Current as of: June 27, 2018  Content Version: 11.9  © 3255-6441 [employer]. Care instructions adapted under license by [employer] (which disclaims liability or warranty for this information). If you have questions about a medical condition or this instruction, always ask your healthcare professional. [place] [address] any warranty or liability for your use of this information. Recovering From Depression: Care Instructions  Your Care Instructions    Taking good care of yourself is important as you recover from depression. In time, your symptoms will fade as your treatment takes hold. Do not give up. Instead, focus your energy on getting better. Your mood will improve. It just takes some time. Focus on things that can help you feel better, such as being with friends and family, eating well, and getting enough rest. But take things slowly. Do not do too much too soon. You will begin to feel better gradually. Follow-up care is a key part of your treatment and safety. Be sure to make and go to all appointments, and call your doctor if you are having problems.  It's also a good idea to know your test results and keep a list of the medicines you take. How can you care for yourself at home? Be realistic  · If you have a large task to do, break it up into smaller steps you can handle, and just do what you can. · You may want to put off important decisions until your depression has lifted. If you have plans that will have a major impact on your life, such as marriage, divorce, or a job change, try to wait a bit. Talk it over with friends and loved ones who can help you look at the overall picture first.  · Reaching out to people for help is important. Do not isolate yourself. Let your family and friends help you. Find someone you can trust and confide in, and talk to that person. · Be patient, and be kind to yourself. Remember that depression is not your fault and is not something you can overcome with willpower alone. Treatment is necessary for depression, just like for any other illness. Feeling better takes time, and your mood will improve little by little. Stay active  · Stay busy and get outside. Take a walk, or try some other light exercise. · Talk with your doctor about an exercise program. Exercise can help with mild depression. · Go to a movie or concert. Take part in a Sikh activity or other social gathering. Go to a ball game. · Ask a friend to have dinner with you. Take care of yourself  · Eat a balanced diet with plenty of fresh fruits and vegetables, whole grains, and lean protein. If you have lost your appetite, eat small snacks rather than large meals. · Avoid drinking alcohol or using illegal drugs. Do not take medicines that have not been prescribed for you. They may interfere with medicines you may be taking for depression, or they may make your depression worse. · Take your medicines exactly as they are prescribed. You may start to feel better within 1 to 3 weeks of taking antidepressant medicine. But it can take as many as 6 to 8 weeks to see more improvement.  If you have questions or concerns about your medicines, or if you do not notice any improvement by 3 weeks, talk to your doctor. · If you have any side effects from your medicine, tell your doctor. Antidepressants can make you feel tired, dizzy, or nervous. Some people have dry mouth, constipation, headaches, sexual problems, or diarrhea. Many of these side effects are mild and will go away on their own after you have been taking the medicine for a few weeks. Some may last longer. Talk to your doctor if side effects are bothering you too much. You might be able to try a different medicine. · Get enough sleep. If you have problems sleeping:  ? Go to bed at the same time every night, and get up at the same time every morning. ? Keep your bedroom dark and quiet. ? Do not exercise after 5:00 p.m.  ? Avoid drinks with caffeine after 5:00 p.m. · Avoid sleeping pills unless they are prescribed by the doctor treating your depression. Sleeping pills may make you groggy during the day, and they may interact with other medicine you are taking. · If you have any other illnesses, such as diabetes, heart disease, or high blood pressure, make sure to continue with your treatment. Tell your doctor about all of the medicines you take, including those with or without a prescription. · Keep the numbers for these national suicide hotlines: 8-317-836-TALK (2-620.858.4476) and 7-182-HEFUSXJ (9-574.275.5428). If you or someone you know talks about suicide or feeling hopeless, get help right away. When should you call for help? Call 911 anytime you think you may need emergency care. For example, call if:    · You feel like hurting yourself or someone else.     · Someone you know has depression and is about to attempt or is attempting suicide.   [facility] your doctor now or seek immediate medical care if:    · You hear voices.     · Someone you know has depression and:  ? Starts to give away his or her possessions. ? Uses illegal drugs or drinks alcohol heavily.   ? Talks or writes about death, including writing suicide notes or talking about guns, knives, or pills. ? Starts to spend a lot of time alone. ? Acts very aggressively or suddenly appears calm.    Watch closely for changes in your health, and be sure to contact your doctor if:    · You do not get better as expected. Where can you learn more? Go to http://evelin-radha.info/. Enter O649 in the search box to learn more about \"Recovering From Depression: Care Instructions. \"  Current as of: September 11, 2018  Content Version: 11.9  © 6067-4648 [employer]. Care instructions adapted under license by [employer] (which disclaims liability or warranty for this information). If you have questions about a medical condition or this instruction, always ask your healthcare professional. [address] any warranty or liability for your use of this information.

## 2019-04-22 NOTE — PROGRESS NOTES
1. Have you been to the ER, urgent care clinic since your last visit? Hospitalized since your last visit? Yes [facility] on 4/2/19 for panic attack, blood pressure was high. 2. Have you seen or consulted any other health care providers outside of the [address] [place] [address] since your last visit? Include any pap smears or colon screening. EVMS    Patient is being seen today for panic attacks and headaches that started after having a D&E at [facility] on March 22nd. Patient said she has been very depressed and scored a 21 on depression screening. Patient said she had no thoughts of self harm she just dose not know her purpose in life. Patient was also recently let go from her job. Chief Complaint   Patient presents with    Headache    Panic Attack     started after having D&E on march 22nd, has had 4 since.

## 2019-04-23 RX ORDER — MONTELUKAST SODIUM 10 MG/1
TABLET ORAL
Qty: 90 TAB | Refills: 2 | Status: SHIPPED | OUTPATIENT
Start: 2019-04-23 | End: 2019-07-25 | Stop reason: SDUPTHER

## 2019-04-23 RX ORDER — BUPROPION HYDROCHLORIDE 75 MG/1
TABLET ORAL
Qty: 180 TAB | Refills: 0 | OUTPATIENT
Start: 2019-04-23

## 2019-04-28 NOTE — PROGRESS NOTES
Patient: Sherif Weiner  Date of Service: 4/22/2019   Provider: MARU Whittaker     REASON FOR VISIT:   Chief Complaint   Patient presents with    Headache    Panic Attack     started after having D&E on march 22nd, has had 4 since. HISTORY OF PRESENT ILLNESS:   Sherif Weiner is a 37 y.o. female who presents to the office for acute care. She c/o anxiety and recurrent panic attacks x one month. States that it started after having miscarrying her recent pregnancy. In addition to that she lost her job. States that her position was terminated. 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 States that since then, she has not been treated well at work which is causing lot os stress. States that she has not been able to do anything but cry. She has placed over [de-identified] application for a new job but has not been successful. States that she is depressed and overwhelmed. Denies S/H/Is. She is here with her . ALLERGIES:   Allergies   Allergen Reactions    Ativan [Lorazepam] Other (comments)     Headaches     Other Plant, Animal, Environmental Other (comments)        ACTIVE MEDICAL PROBLEMS:  Patient Active Problem List   Diagnosis Code    Essential hypertension I10    Asthma J45.909    Anxiety F41.9    Chronic bilateral low back pain without sciatica M54.5, G89.29    Chest wall pain R07.89    Obesity, morbid ([address] E66.01    Depression, major, single episode, severe (HCC) F32.2        MEDICATIONS:   Current Outpatient Medications   Medication Sig Dispense Refill    busPIRone (BUSPAR) 5 mg tablet Take 1 Tab by mouth three (3) times daily (with meals). 90 Tab 0    buPROPion (WELLBUTRIN) 75 mg tablet Take 1 Tab by mouth two (2) times a day.  60 Tab 0    furosemide (LASIX) 20 mg tablet TAKE 1 TABLET BY MOUTH DAILY AS NEEDED 30 Tab 0    albuterol (PROVENTIL HFA, VENTOLIN HFA, PROAIR HFA) 90 mcg/actuation inhaler Take 1 Puff by inhalation every four (4) hours as needed for Wheezing. 1 Inhaler 2    montelukast (SINGULAIR) 10 mg tablet TAKE 1 TABLET BY MOUTH DAILY 90 Tab 2    PNV KM.86/PAOCFPN fum/folic ac (PRENATAL PO) Take  by mouth.  FOLIC ACID PO Take  by mouth.  dextroamphetamine-amphetamine (ADDERALL) 20 mg tablet Take 20 mg by mouth.  lisinopril (PRINIVIL, ZESTRIL) 20 mg tablet TAKE 1 TABLET BY MOUTH EVERY DAY 90 Tab 0    lamoTRIgine (LAMICTAL) 100 mg tablet Take  by mouth daily.  norethindrone (MICRONOR) 0.35 mg tab TK 1 T PO QD  3    fluticasone (VERAMYST) 27.5 mcg/actuation nasal spray 2 Sprays by Nasal route daily. 1 Bottle 2        ROS:   Pertinent positive as above in HPI. All others negative. PHYSICAL EXAMINATION:  Visit Vitals  BP (!) 140/93 (BP 1 Location: Left arm, BP Patient Position: Sitting)   Pulse 70   Temp 97.8 °F (36.6 °C) (Oral)   Resp 16   Ht 5' 3\" (1.6 m)   Wt 334 lb (151.5 kg)   LMP 01/12/2019 Comment: Had D&E on march 22nd, 2019   SpO2 96%   BMI 59.17 kg/m²      Body mass index is 59.17 kg/m². Appearance: alert, well appearing, and in no distress. Lungs are clear, good air entry, no wheezes, rhonchi or rales. S1 and S2 normal, no murmurs, regular rate and rhythm. Neurological is normal, no focal findings. Psychiatric: Oriented to time, place and person. Sad mood, very emotional and crying during visit. Pleasant and cooperative. ASSESSMENT/PLAN:  Diagnoses and all orders for this visit:    1. Essential hypertension    2. Panic attacks  -     busPIRone (BUSPAR) 5 mg tablet; Take 1 Tab by mouth three (3) times daily (with meals). 3. Seasonal allergic rhinitis, unspecified trigger    4. Depression, major, single episode, severe (HCC)  -     buPROPion (WELLBUTRIN) 75 mg tablet; Take 1 Tab by mouth two (2) times a day. Will restart Lisinopril and discontinue Methyldopa. Still has her medication from previous prescription at home.    Started on Wellbutrin anxiety and Buspar for panic attacks. Advised both wife and  to discuss about her woek situation as this is causing more harm to her psychologically than good. She is insisting that the couple needs money that is why she has to work. I explained to both that the trauma of the miscarriage was too heavy and overwhelming and staying in a job that is causing more stress is not good for her. Patient advised to return to clinic if symptoms persist or to ED if they become worse. Plan was reviewed with patient, education material was explained and given. Patient verbalized understanding. I have spent over 40 minutes in face to face time with this pt in discussion with respect to the aforementioned problems, diagnoses and management plans. >50% of the time was spent counseling and coordinating care. Follow-up and Dispositions    · Return in about 2 weeks (around 5/6/2019).           MARU Garsia   4/22/2019   8:58 PM

## 2019-05-08 ENCOUNTER — OFFICE VISIT (OUTPATIENT)
Dept: FAMILY MEDICINE CLINIC | Age: 34
End: 2019-05-08

## 2019-05-08 VITALS
DIASTOLIC BLOOD PRESSURE: 96 MMHG | RESPIRATION RATE: 18 BRPM | WEIGHT: 293 LBS | HEART RATE: 60 BPM | OXYGEN SATURATION: 97 % | HEIGHT: 63 IN | SYSTOLIC BLOOD PRESSURE: 135 MMHG | BODY MASS INDEX: 51.91 KG/M2

## 2019-05-08 DIAGNOSIS — F41.0 PANIC ATTACKS: ICD-10-CM

## 2019-05-08 DIAGNOSIS — F32.2 DEPRESSION, MAJOR, SINGLE EPISODE, SEVERE (HCC): ICD-10-CM

## 2019-05-08 DIAGNOSIS — I10 ESSENTIAL HYPERTENSION: ICD-10-CM

## 2019-05-08 DIAGNOSIS — E66.01 OBESITY, MORBID (HCC): ICD-10-CM

## 2019-05-08 DIAGNOSIS — F32.2 DEPRESSION, MAJOR, SINGLE EPISODE, SEVERE (HCC): Primary | ICD-10-CM

## 2019-05-08 DIAGNOSIS — F41.9 ANXIETY DISORDER, UNSPECIFIED TYPE: ICD-10-CM

## 2019-05-08 RX ORDER — BUPROPION HYDROCHLORIDE 75 MG/1
75 TABLET ORAL 3 TIMES DAILY
Qty: 90 TAB | Refills: 1 | Status: SHIPPED | OUTPATIENT
Start: 2019-05-08 | End: 2019-05-08 | Stop reason: SDUPTHER

## 2019-05-08 RX ORDER — CHLORTHALIDONE 25 MG/1
12.5 TABLET ORAL DAILY
Qty: 30 TAB | Refills: 2 | Status: SHIPPED | OUTPATIENT
Start: 2019-05-08 | End: 2019-05-08 | Stop reason: SDUPTHER

## 2019-05-08 RX ORDER — LISINOPRIL 20 MG/1
TABLET ORAL
Qty: 90 TAB | Refills: 0 | Status: SHIPPED | OUTPATIENT
Start: 2019-05-08 | End: 2019-08-04 | Stop reason: SDUPTHER

## 2019-05-08 RX ORDER — BUSPIRONE HYDROCHLORIDE 5 MG/1
5 TABLET ORAL
Qty: 90 TAB | Refills: 1 | Status: SHIPPED | OUTPATIENT
Start: 2019-05-08 | End: 2019-06-10 | Stop reason: DRUGHIGH

## 2019-05-08 RX ORDER — BUPROPION HYDROCHLORIDE 75 MG/1
TABLET ORAL
Qty: 270 TAB | Refills: 1 | Status: SHIPPED | OUTPATIENT
Start: 2019-05-08 | End: 2019-10-15 | Stop reason: DRUGHIGH

## 2019-05-08 RX ORDER — CHLORTHALIDONE 25 MG/1
TABLET ORAL
Qty: 45 TAB | Refills: 2 | Status: SHIPPED | OUTPATIENT
Start: 2019-05-08 | End: 2019-06-10 | Stop reason: DRUGHIGH

## 2019-05-08 NOTE — PROGRESS NOTES
1. Have you been to the ER, urgent care clinic since your last visit? Hospitalized since your last visit? No    2. Have you seen or consulted any other health care providers outside of the [address] [place] [address] since your last visit? Include any pap smears or colon screening.  No        Chief Complaint   Patient presents with    Panic Attack     follow up

## 2019-05-08 NOTE — PATIENT INSTRUCTIONS
Low Sodium Diet (2,000 Milligram): Care Instructions Your Care Instructions Too much sodium causes your body to hold on to extra water. This can raise your blood pressure and force your heart and kidneys to work harder. In very serious cases, this could cause you to be put in the hospital. It might even be life-threatening. By limiting sodium, you will feel better and lower your risk of serious problems. The most common source of sodium is salt. People get most of the salt in their diet from canned, prepared, and packaged foods. Fast food and restaurant meals also are very high in sodium. Your doctor will probably limit your sodium to less than 2,000 milligrams (mg) a day. This limit counts all the sodium in prepared and packaged foods and any salt you add to your food. Follow-up care is a key part of your treatment and safety. Be sure to make and go to all appointments, and call your doctor if you are having problems. It's also a good idea to know your test results and keep a list of the medicines you take. How can you care for yourself at home? Read food labels · Read labels on cans and food packages. The labels tell you how much sodium is in each serving. Make sure that you look at the serving size. If you eat more than the serving size, you have eaten more sodium. · Food labels also tell you the Percent Daily Value for sodium. Choose products with low Percent Daily Values for sodium. · Be aware that sodium can come in forms other than salt, including monosodium glutamate (MSG), sodium citrate, and sodium bicarbonate (baking soda). MSG is often added to Asian food. When you eat out, you can sometimes ask for food without MSG or added salt. Buy low-sodium foods · Buy foods that are labeled \"unsalted\" (no salt added), \"sodium-free\" (less than 5 mg of sodium per serving), or \"low-sodium\" (less than 140 mg of sodium per serving).  Foods labeled \"reduced-sodium\" and \"light sodium\" may still have too much sodium. Be sure to read the label to see how much sodium you are getting. · Buy fresh vegetables, or frozen vegetables without added sauces. Buy low-sodium versions of canned vegetables, soups, and other canned goods. Prepare low-sodium meals · Cut back on the amount of salt you use in cooking. This will help you adjust to the taste. Do not add salt after cooking. One teaspoon of salt has about 2,300 mg of sodium. · Take the salt shaker off the table. · Flavor your food with garlic, lemon juice, onion, vinegar, herbs, and spices. Do not use soy sauce, lite soy sauce, steak sauce, onion salt, garlic salt, celery salt, mustard, or ketchup on your food. · Use low-sodium salad dressings, sauces, and ketchup. Or make your own salad dressings and sauces without adding salt. · Use less salt (or none) when recipes call for it. You can often use half the salt a recipe calls for without losing flavor. Other foods such as rice, pasta, and grains do not need added salt. · Rinse canned vegetables, and cook them in fresh water. This removes somebut not allof the salt. · Avoid water that is naturally high in sodium or that has been treated with water softeners, which add sodium. Call your local water company to find out the sodium content of your water supply. If you buy bottled water, read the label and choose a sodium-free brand. Avoid high-sodium foods · Avoid eating: 
? Smoked, cured, salted, and canned meat, fish, and poultry. ? Ham, mays, hot dogs, and luncheon meats. ? Regular, hard, and processed cheese and regular peanut butter. ? Crackers with salted tops, and other salted snack foods such as pretzels, chips, and salted popcorn. ? Frozen prepared meals, unless labeled low-sodium. ? Canned and dried soups, broths, and bouillon, unless labeled sodium-free or low-sodium. ? Canned vegetables, unless labeled sodium-free or low-sodium. ? [place] fries, pizza, tacos, and other fast foods. ? Pickles, olives, ketchup, and other condiments, especially soy sauce, unless labeled sodium-free or low-sodium. Where can you learn more? Go to http://evelin-radha.info/. Enter D340 in the search box to learn more about \"Low Sodium Diet (2,000 Milligram): Care Instructions. \" Current as of: March 28, 2018 Content Version: 11.9 © 1382-0820 [employer]. Care instructions adapted under license by [employer] (which disclaims liability or warranty for this information). If you have questions about a medical condition or this instruction, always ask your healthcare professional. [address] any warranty or liability for your use of this information. Learning About Low-Carbohydrate Diets for Weight Loss What is a low-carbohydrate diet? Low-carb diets avoid foods that are high in carbohydrate. These high-carb foods include pasta, bread, rice, cereal, fruits, and starchy vegetables. Instead, these diets usually have you eat foods that are high in fat and protein. Many people lose weight quickly on a low-carb diet. But the early weight loss is water. People on this diet often gain the weight back after they start eating carbs again. Not all diet plans are safe or work well. A lot of the evidence shows that low-carb diets aren't healthy. That's because these diets often don't include healthy foods like fruits and vegetables. Losing weight safely means balancing protein, fat, and carbs with every meal and snack. And low-carb diets don't always provide the vitamins, minerals, and fiber you need. If you have a serious medical condition, talk to your doctor before you try any diet. These conditions include kidney disease, heart disease, type 2 diabetes, high cholesterol, and high blood pressure. If you are pregnant, it may not be safe for your baby if you are on a low-carb diet. How can you lose weight safely? You might have heard that a diet plan helped another person lose weight. But that doesn't mean that it will work for you. It is very hard to stay on a diet that includes lots of big changes in your eating habits. If you want to get to a healthy weight and stay there, making healthy lifestyle changes will often work better than dieting. These steps can help. · Make a plan for change. Work with your doctor to create a plan that is right for you. · See a dietitian. He or she can show you how to make healthy changes in your eating habits. · Manage stress. If you have a lot of stress in your life, it can be hard to focus on making healthy changes to your daily habits. · Track your food and activity. You are likely to do better at losing weight if you keep track of what you eat and what you do. Follow-up care is a key part of your treatment and safety. Be sure to make and go to all appointments, and call your doctor if you are having problems. It's also a good idea to know your test results and keep a list of the medicines you take. Where can you learn more? Go to http://evelin-radha.info/. Enter A121 in the search box to learn more about \"Learning About Low-Carbohydrate Diets for Weight Loss. \" Current as of: March 28, 2018 Content Version: 11.9 © 8802-8424 [employer]. Care instructions adapted under license by [employer] (which disclaims liability or warranty for this information). If you have questions about a medical condition or this instruction, always ask your healthcare professional. [place] [address] any warranty or liability for your use of this information.

## 2019-05-27 NOTE — PROGRESS NOTES
Patient:  Elvis Blue  Patient :  1985    Subjective:     Chief Complaint   Patient presents with    Panic Attack     follow up        Elvis Blue is a 37 y.o. female present for follow up of chronic diseases and medication refills. Is complaint with her medications with no adverse effects reported. Is feeling better since she quits her job. Has not had any anxiety attacks since last visit but is still anxious. Is also here to discuss obesity. BP elevated this visit. Current Outpatient Meds and Allergies     Current Outpatient Medications on File Prior to Visit   Medication Sig Dispense Refill    furosemide (LASIX) 20 mg tablet TAKE 1 TABLET BY MOUTH DAILY AS NEEDED 30 Tab 0    montelukast (SINGULAIR) 10 mg tablet TAKE 1 TABLET BY MOUTH DAILY 90 Tab 2    norethindrone (MICRONOR) 0.35 mg tab TK 1 T PO QD  3    PNV JX.52/VRYEFFC fum/folic ac (PRENATAL PO) Take  by mouth.  FOLIC ACID PO Take  by mouth.  dextroamphetamine-amphetamine (ADDERALL) 20 mg tablet Take 20 mg by mouth.  lamoTRIgine (LAMICTAL) 100 mg tablet Take  by mouth daily.  fluticasone (VERAMYST) 27.5 mcg/actuation nasal spray 2 Sprays by Nasal route daily. 1 Bottle 2    albuterol (PROVENTIL HFA, VENTOLIN HFA, PROAIR HFA) 90 mcg/actuation inhaler Take 1 Puff by inhalation every four (4) hours as needed for Wheezing. 1 Inhaler 2     No current facility-administered medications on file prior to visit. These medications have been reviewed and reconciled with the patient during today's visit.       Allergies   Allergen Reactions    Ativan [Lorazepam] Other (comments)     Headaches     Other Plant, Animal, Environmental Other (comments)         ROS:     Constitutional: Negative for fever, chills, or fatigue  Neurological: Negative for headache, dizziness, visual disturbance, or loss of conciousness  Respiratory: Negative for SOB, hemoptysis, or wheezing  Cardiovascular: Negative for chest pain, palpitation, or leg swelling  Gastrointestinal: Negative for abdominal pain, nausea, vomiting, diarrhea, blood in stool, melena, or heartburn  Musculoskeletal: Negative for falls  PSYCH: No agitation, confusion/disorientation, suicidal or homicidal ideation. Objective:     VS:    Visit Vitals  BP (!) 135/96 (BP 1 Location: Right arm, BP Patient Position: Sitting)   Pulse 60   Resp 18   Ht 5' 3\" (1.6 m)   Wt 325 lb (147.4 kg)   LMP 04/24/2019 (Exact Date)   SpO2 97%   BMI 57.57 kg/m²       General: Obese white female, well-nourished, interacting appropriately, in no acute distress  Skin: warm and dry, no rashes , no bruises  Neck: supple, symmetrical, no thyromegaly  Respiratory: symmetrical chest expansion, lung sounds clear bilaterally, good air entry  Cardiovascular: normal S1S2, regular rate and rhythm, no murmurs  Abdomen: non-distended, normoactive bowel sounds x 4 quadrants, soft, non-tender to palpation  Musculoskeletal: normal ROM on all joints, no swelling or deformity, no perilumbar tenderness, steady gait  Neuro: Alert and oriented x 3,   Psychiatry: appropriate mood and affect  Extremity: No edema noted      Pertinent diagnostic procedures include:  Hospital Outpatient Visit on 02/11/2019   Component Date Value Ref Range Status    HCG, Ql. 02/11/2019 POSITIVE* NEG   Final    Test results should be confirmed using serum quantitative hCG when detection of pregnancy is critical and before performing any critical medical procedure. Assessment/Plan:       1. Depression, major, single episode, severe ([address]    2. Anxiety disorder, unspecified type    3. Obesity, morbid (HCC)  - liraglutide (SAXENDA) 3 mg/0.5 mL (18 mg/3 mL) pen; 0.6 mg once daily for two weeks; increase by 0.6 mg daily  Indications: Weight Loss Management for an Obese Person  Dispense: 3 Adjustable Dose Pre-filled Pen Syringe; Refill: 1    4.  Essential hypertension  - lisinopril (PRINIVIL, ZESTRIL) 20 mg tablet; TAKE 1 TABLET BY MOUTH EVERY DAY  Dispense: 90 Tab; Refill: 0    5. Panic attacks  - busPIRone (BUSPAR) 5 mg tablet; Take 1 Tab by mouth three (3) times daily (with meals). Dispense: 90 Tab; Refill: 1    I have discussed the diagnosis with the patient and the intended plan as seen in the above orders. The patient has received an after-visit summary along with patient information handout. I have discussed medication side effects and warnings with the patient as well. Pt verbalized understanding. Follow-up and Dispositions    · Return in about 1 month (around 6/5/2019).        MARU Leblanc  5/27/2019, 12:57 AM

## 2019-06-05 DIAGNOSIS — I10 ESSENTIAL HYPERTENSION: ICD-10-CM

## 2019-06-05 DIAGNOSIS — R60.9 EDEMA, UNSPECIFIED TYPE: ICD-10-CM

## 2019-06-05 RX ORDER — FUROSEMIDE 20 MG/1
TABLET ORAL
Qty: 30 TAB | Refills: 0 | Status: SHIPPED | OUTPATIENT
Start: 2019-06-05 | End: 2019-07-12 | Stop reason: SDUPTHER

## 2019-06-10 ENCOUNTER — OFFICE VISIT (OUTPATIENT)
Dept: FAMILY MEDICINE CLINIC | Age: 34
End: 2019-06-10

## 2019-06-10 VITALS
BODY MASS INDEX: 51.91 KG/M2 | TEMPERATURE: 98 F | WEIGHT: 293 LBS | RESPIRATION RATE: 18 BRPM | DIASTOLIC BLOOD PRESSURE: 71 MMHG | OXYGEN SATURATION: 98 % | SYSTOLIC BLOOD PRESSURE: 126 MMHG | HEIGHT: 63 IN | HEART RATE: 71 BPM

## 2019-06-10 DIAGNOSIS — F41.9 ANXIETY DISORDER, UNSPECIFIED TYPE: ICD-10-CM

## 2019-06-10 DIAGNOSIS — I10 ESSENTIAL HYPERTENSION: ICD-10-CM

## 2019-06-10 DIAGNOSIS — F41.0 PANIC ATTACKS: ICD-10-CM

## 2019-06-10 DIAGNOSIS — M79.672 PAIN OF LEFT HEEL: Primary | ICD-10-CM

## 2019-06-10 DIAGNOSIS — F32.2 DEPRESSION, MAJOR, SINGLE EPISODE, SEVERE (HCC): ICD-10-CM

## 2019-06-10 RX ORDER — CHLORTHALIDONE 25 MG/1
25 TABLET ORAL DAILY
Qty: 90 TAB | Refills: 2 | Status: SHIPPED | OUTPATIENT
Start: 2019-06-10 | End: 2021-11-04

## 2019-06-10 RX ORDER — BUSPIRONE HYDROCHLORIDE 10 MG/1
10 TABLET ORAL 3 TIMES DAILY
Qty: 90 TAB | Refills: 1 | Status: SHIPPED | OUTPATIENT
Start: 2019-06-10 | End: 2019-10-15 | Stop reason: SDUPTHER

## 2019-06-10 NOTE — PROGRESS NOTES
Today's Date:  6/10/2019   Patient:  Moira Norton  Patient :  1985    Subjective:     Chief Complaint   Patient presents with    Depression       Moira Norton is a 37 y.o. female with medical problems listed below who presents for follow up of Hypertension, Obesity, Anxiety and Depression and Panic Attacks. Has been feeling much better. Has had 2 panic attacks since last visit and states that symptoms are controlled with Buspirone. She is complaint with her medications with no adverse effects reported. Was started on Saxenda at last visit which she is taking. Has modified her diet and has lost 4 pounds recently. States that she has not been able to exercise (walk) because of her heel pain. States that this started recently. Pain prevent her from walking a long period of time. Denies chest pain, headache, palpitations, lightheadedness, dizziness, swelling or redness. Current Outpatient Meds and Allergies     Current Outpatient Medications on File Prior to Visit   Medication Sig Dispense Refill    furosemide (LASIX) 20 mg tablet TAKE 1 TABLET BY MOUTH DAILY AS NEEDED 30 Tab 0    liraglutide (SAXENDA) 3 mg/0.5 mL (18 mg/3 mL) pen 0.6 mg once daily for two weeks; increase by 0.6 mg daily  Indications: Weight Loss Management for an Obese Person 3 Adjustable Dose Pre-filled Pen Syringe 1    lisinopril (PRINIVIL, ZESTRIL) 20 mg tablet TAKE 1 TABLET BY MOUTH EVERY DAY 90 Tab 0    buPROPion (WELLBUTRIN) 75 mg tablet TAKE 1 TABLET BY MOUTH THREE TIMES DAILY 270 Tab 1    chlorthalidone (HYGROTEN) 25 mg tablet TAKE 1/2 TABLET BY MOUTH DAILY 45 Tab 2    montelukast (SINGULAIR) 10 mg tablet TAKE 1 TABLET BY MOUTH DAILY 90 Tab 2    norethindrone (MICRONOR) 0.35 mg tab TK 1 T PO QD  3    albuterol (PROVENTIL HFA, VENTOLIN HFA, PROAIR HFA) 90 mcg/actuation inhaler Take 1 Puff by inhalation every four (4) hours as needed for Wheezing.  1 Inhaler 2    PNV GA.50/NULGDDE fum/folic ac (PRENATAL PO) Take  by mouth.      FOLIC ACID PO Take  by mouth.  dextroamphetamine-amphetamine (ADDERALL) 20 mg tablet Take 20 mg by mouth.  lamoTRIgine (LAMICTAL) 100 mg tablet Take  by mouth daily.  fluticasone (VERAMYST) 27.5 mcg/actuation nasal spray 2 Sprays by Nasal route daily. 1 Bottle 2     No current facility-administered medications on file prior to visit. These medications have been reviewed and reconciled with the patient during today's visit. Allergies   Allergen Reactions    Ativan [Lorazepam] Other (comments)     Headaches     Other Plant, Animal, Environmental Other (comments)         ROS:     Constitutional: Negative for fever, chills, or fatigue  Neurological: Negative for headache, dizziness, visual disturbance, or loss of conciousness  Respiratory: Negative for SOB, hemoptysis, or wheezing  Cardiovascular: Negative for chest pain, palpitation, or leg swelling  Gastrointestinal: Negative for abdominal pain, nausea, vomiting, diarrhea, blood in stool, melena, or heartburn  Musculoskeletal: Negative for falls  PSYCH: No agitation, confusion/disorientation, suicidal or homicidal ideation.      Objective:     VS:    Visit Vitals  /71 (BP 1 Location: Left arm, BP Patient Position: Sitting)   Pulse 71   Temp 98 °F (36.7 °C) (Oral)   Resp 18   Ht 5' 3\" (1.6 m)   Wt 321 lb (145.6 kg)   LMP 05/17/2019 (Exact Date)   SpO2 98%   BMI 56.86 kg/m²       General: Obese white female, well-nourished, interacting appropriately, in no acute distress  Skin: warm and dry, no rashes , no bruises  Neck: supple, symmetrical, no thyromegaly  Respiratory: symmetrical chest expansion, lung sounds clear bilaterally, good air entry  Cardiovascular: normal S1S2, regular rate and rhythm, no murmurs  Abdomen: non-distended, normoactive bowel sounds x 4 quadrants, soft, non-tender to palpation  Musculoskeletal: normal ROM on all joints, no swelling or deformity, no perilumbar tenderness, steady gait  Neuro: Alert and oriented x 3,   Psychiatry: appropriate mood and affect  Extremity: No edema noted      Pertinent diagnostic procedures include:  No visits with results within 3 Month(s) from this visit. Latest known visit with results is:   Hospital Outpatient Visit on 02/11/2019   Component Date Value Ref Range Status    HCG, Ql. 02/11/2019 POSITIVE* NEG   Final    Test results should be confirmed using serum quantitative hCG when detection of pregnancy is critical and before performing any critical medical procedure. Assessment/Plan:         ICD-10-CM ICD-9-CM    1. Pain of left heel M79.672 729.5 XR CALCANEUS LT      REFERRAL TO SPORTS [facility]IC[facility]   2. Panic attacks F41.0 300.01 busPIRone (BUSPAR) 10 mg tablet   3. Depression, major, single episode, severe ([facility] F32.2 296.23    4. Anxiety disorder, unspecified type F41.9 300.00    5. Essential hypertension I10 401.9 chlorthalidone (HYGROTEN) 25 mg tablet     Take Motrin per  recommendation for Heel pain; Heel stretch; apply ice or heat as needed. Continue medication for Anxiety and Depression, and HTN as prescribed  I have discussed the diagnosis with the patient and the intended plan as seen in the above orders. The patient has received an after-visit summary along with patient information handout. I have discussed medication side effects and warnings with the patient as well. Pt verbalized understanding. Follow-up and Dispositions    · Return in about 3 months (around 9/10/2019).        MARU Villegas  6/10/2019, 9:55 AM

## 2019-06-20 ENCOUNTER — TELEPHONE (OUTPATIENT)
Dept: FAMILY MEDICINE CLINIC | Age: 34
End: 2019-06-20

## 2019-06-20 NOTE — TELEPHONE ENCOUNTER
Patient states that she was started on Saxenda and is having some intestinal issues since starting the medication, bruising, and late menstural cycle.

## 2019-06-20 NOTE — TELEPHONE ENCOUNTER
Estefani spoke to pt and pt states that she is trying to get pregnant. Per Radha she needs to stop the medication.  Estefani relayed that to pt and pt voiced udnerstanding

## 2019-07-12 DIAGNOSIS — R60.9 EDEMA, UNSPECIFIED TYPE: ICD-10-CM

## 2019-07-12 DIAGNOSIS — I10 ESSENTIAL HYPERTENSION: ICD-10-CM

## 2019-07-12 RX ORDER — FUROSEMIDE 20 MG/1
TABLET ORAL
Qty: 30 TAB | Refills: 0 | Status: SHIPPED | OUTPATIENT
Start: 2019-07-12 | End: 2019-09-04 | Stop reason: SDUPTHER

## 2019-08-04 DIAGNOSIS — I10 ESSENTIAL HYPERTENSION: ICD-10-CM

## 2019-08-06 RX ORDER — LISINOPRIL 20 MG/1
TABLET ORAL
Qty: 90 TAB | Refills: 0 | Status: SHIPPED | OUTPATIENT
Start: 2019-08-06 | End: 2019-10-15 | Stop reason: SDUPTHER

## 2019-09-02 DIAGNOSIS — R60.9 EDEMA, UNSPECIFIED TYPE: ICD-10-CM

## 2019-09-02 DIAGNOSIS — I10 ESSENTIAL HYPERTENSION: ICD-10-CM

## 2019-09-04 RX ORDER — FUROSEMIDE 20 MG/1
TABLET ORAL
Qty: 30 TAB | Refills: 0 | Status: SHIPPED | OUTPATIENT
Start: 2019-09-04 | End: 2019-10-03 | Stop reason: SDUPTHER

## 2019-10-15 ENCOUNTER — OFFICE VISIT (OUTPATIENT)
Dept: FAMILY MEDICINE CLINIC | Age: 34
End: 2019-10-15

## 2019-10-15 VITALS
TEMPERATURE: 98.4 F | WEIGHT: 293 LBS | OXYGEN SATURATION: 96 % | BODY MASS INDEX: 51.91 KG/M2 | RESPIRATION RATE: 18 BRPM | HEART RATE: 93 BPM | HEIGHT: 63 IN

## 2019-10-15 DIAGNOSIS — I10 ESSENTIAL HYPERTENSION: ICD-10-CM

## 2019-10-15 DIAGNOSIS — F41.0 PANIC ATTACKS: ICD-10-CM

## 2019-10-15 DIAGNOSIS — F32.2 DEPRESSION, MAJOR, SINGLE EPISODE, SEVERE (HCC): Primary | ICD-10-CM

## 2019-10-15 DIAGNOSIS — R61 EXCESSIVE SWEATING: ICD-10-CM

## 2019-10-15 DIAGNOSIS — E66.01 OBESITY, MORBID (HCC): ICD-10-CM

## 2019-10-15 DIAGNOSIS — J45.909 UNCOMPLICATED ASTHMA, UNSPECIFIED ASTHMA SEVERITY, UNSPECIFIED WHETHER PERSISTENT: ICD-10-CM

## 2019-10-15 RX ORDER — ALBUTEROL SULFATE 90 UG/1
1 AEROSOL, METERED RESPIRATORY (INHALATION)
Qty: 1 INHALER | Refills: 2 | Status: SHIPPED | OUTPATIENT
Start: 2019-10-15 | End: 2019-12-31

## 2019-10-15 RX ORDER — BUPROPION HYDROCHLORIDE 150 MG/1
150 TABLET ORAL
Qty: 30 TAB | Refills: 0 | Status: SHIPPED | OUTPATIENT
Start: 2019-10-15 | End: 2019-11-11 | Stop reason: SDUPTHER

## 2019-10-15 RX ORDER — BUSPIRONE HYDROCHLORIDE 10 MG/1
10 TABLET ORAL 3 TIMES DAILY
Qty: 90 TAB | Refills: 1 | Status: SHIPPED | OUTPATIENT
Start: 2019-10-15 | End: 2021-04-15

## 2019-10-15 RX ORDER — LISINOPRIL 20 MG/1
20 TABLET ORAL DAILY
Qty: 90 TAB | Refills: 0 | Status: SHIPPED | OUTPATIENT
Start: 2019-10-15 | End: 2019-12-09 | Stop reason: SDUPTHER

## 2019-10-15 NOTE — LETTER
NOTIFICATION RETURN TO WORK / SCHOOL 
 
10/15/2019 10:23 AM 
 
Ms. Romy [address] [address] To Whom It May Concern: 
 
Romy [address] is currently under the care of Ferzo Valles. She will return to work/school on 10/15/2019. If there are questions or concerns please have the patient contact our office. Sincerely, MARU Vitale

## 2019-10-15 NOTE — PROGRESS NOTES
Jamie Barker is a 37 y.o. female here today c/o anxiety. She has been getting all over body pain, her arms feel like they have weights on them, and she gets very hot all the time to the point of profuse sweating.

## 2019-10-15 NOTE — PATIENT INSTRUCTIONS
Recovering From Depression: Care Instructions  Your Care Instructions    Taking good care of yourself is important as you recover from depression. In time, your symptoms will fade as your treatment takes hold. Do not give up. Instead, focus your energy on getting better. Your mood will improve. It just takes some time. Focus on things that can help you feel better, such as being with friends and family, eating well, and getting enough rest. But take things slowly. Do not do too much too soon. You will begin to feel better gradually. Follow-up care is a key part of your treatment and safety. Be sure to make and go to all appointments, and call your doctor if you are having problems. It's also a good idea to know your test results and keep a list of the medicines you take. How can you care for yourself at home? Be realistic  · If you have a large task to do, break it up into smaller steps you can handle, and just do what you can. · You may want to put off important decisions until your depression has lifted. If you have plans that will have a major impact on your life, such as marriage, divorce, or a job change, try to wait a bit. Talk it over with friends and loved ones who can help you look at the overall picture first.  · Reaching out to people for help is important. Do not isolate yourself. Let your family and friends help you. Find someone you can trust and confide in, and talk to that person. · Be patient, and be kind to yourself. Remember that depression is not your fault and is not something you can overcome with willpower alone. Treatment is necessary for depression, just like for any other illness. Feeling better takes time, and your mood will improve little by little. Stay active  · Stay busy and get outside. Take a walk, or try some other light exercise. · Talk with your doctor about an exercise program. Exercise can help with mild depression. · Go to a movie or concert.  Take part in a Orthod[employer] activity or other social gathering. Go to a [place] game. · Ask a friend to have dinner with you. Take care of yourself  · Eat a balanced diet with plenty of fresh fruits and vegetables, whole grains, and lean protein. If you have lost your appetite, eat small snacks rather than large meals. · Avoid drinking alcohol or using illegal drugs. Do not take medicines that have not been prescribed for you. They may interfere with medicines you may be taking for depression, or they may make your depression worse. · Take your medicines exactly as they are prescribed. You may start to feel better within 1 to 3 weeks of taking antidepressant medicine. But it can take as many as 6 to 8 weeks to see more improvement. If you have questions or concerns about your medicines, or if you do not notice any improvement by 3 weeks, talk to your doctor. · If you have any side effects from your medicine, tell your doctor. Antidepressants can make you feel tired, dizzy, or nervous. Some people have dry mouth, constipation, headaches, sexual problems, or diarrhea. Many of these side effects are mild and will go away on their own after you have been taking the medicine for a few weeks. Some may last longer. Talk to your doctor if side effects are bothering you too much. You might be able to try a different medicine. · Get enough sleep. If you have problems sleeping:  ? Go to bed at the same time every night, and get up at the same time every morning. ? Keep your bedroom dark and quiet. ? Do not exercise after 5:00 p.m.  ? Avoid drinks with caffeine after 5:00 p.m. · Avoid sleeping pills unless they are prescribed by the doctor treating your depression. Sleeping pills may make you groggy during the day, and they may interact with other medicine you are taking. · If you have any other illnesses, such as diabetes, heart disease, or high blood pressure, make sure to continue with your treatment.  Tell your doctor about all of the medicines you take, including those with or without a prescription. · Keep the numbers for these national suicide hotlines: 5-406-937-TALK (7-667.576.8022) and 6-936-IEBKDEF (2-828.876.1054). If you or someone you know talks about suicide or feeling hopeless, get help right away. When should you call for help? Call 911 anytime you think you may need emergency care. For example, call if:    · You feel like hurting yourself or someone else.     · Someone you know has depression and is about to attempt or is attempting suicide.   [facility] your doctor now or seek immediate medical care if:    · You hear voices.     · Someone you know has depression and:  ? Starts to give away his or her possessions. ? Uses illegal drugs or drinks alcohol heavily. ? Talks or writes about death, including writing suicide notes or talking about guns, knives, or pills. ? Starts to spend a lot of time alone. ? Acts very aggressively or suddenly appears calm.    Watch closely for changes in your health, and be sure to contact your doctor if:    · You do not get better as expected. Where can you learn more? Go to http://evelin-radha.info/. Enter M733 in the search box to learn more about \"Recovering From Depression: Care Instructions. \"  Current as of: May 28, 2019  Content Version: 12.2  © 1353-8202 [employer]. Care instructions adapted under license by [employer] (which disclaims liability or warranty for this information). If you have questions about a medical condition or this instruction, always ask your healthcare professional. [place] [address] any warranty or liability for your use of this information. Learning About Low-Carbohydrate Diets for Weight Loss  What is a low-carbohydrate diet? Low-carb diets avoid foods that are high in carbohydrate. These high-carb foods include pasta, bread, rice, cereal, fruits, and starchy vegetables.  Instead, these diets usually have you eat foods that are high in fat and protein. Many people lose weight quickly on a low-carb diet. But the early weight loss is water. People on this diet often gain the weight back after they start eating carbs again. Not all diet plans are safe or work well. A lot of the evidence shows that low-carb diets aren't healthy. That's because these diets often don't include healthy foods like fruits and vegetables. Losing weight safely means balancing protein, fat, and carbs with every meal and snack. And low-carb diets don't always provide the vitamins, minerals, and fiber you need. If you have a serious medical condition, talk to your doctor before you try any diet. These conditions include kidney disease, heart disease, type 2 diabetes, high cholesterol, and high blood pressure. If you are pregnant, it may not be safe for your baby if you are on a low-carb diet. How can you lose weight safely? You might have heard that a diet plan helped another person lose weight. But that doesn't mean that it will work for you. It is very hard to stay on a diet that includes lots of big changes in your eating habits. If you want to get to a healthy weight and stay there, making healthy lifestyle changes will often work better than dieting. These steps can help. · Make a plan for change. Work with your doctor to create a plan that is right for you. · See a dietitian. He or she can show you how to make healthy changes in your eating habits. · Manage stress. If you have a lot of stress in your life, it can be hard to focus on making healthy changes to your daily habits. · Track your food and activity. You are likely to do better at losing weight if you keep track of what you eat and what you do. Follow-up care is a key part of your treatment and safety. Be sure to make and go to all appointments, and call your doctor if you are having problems.  It's also a good idea to know your test results and keep a list of the medicines you take. Where can you learn more? Go to http://evelin-radha.info/. Enter A121 in the search box to learn more about \"Learning About Low-Carbohydrate Diets for Weight Loss. \"  Current as of: November 7, 2018  Content Version: 12.2  © 0103-6125 [employer]. Care instructions adapted under license by [employer] (which disclaims liability or warranty for this information). If you have questions about a medical condition or this instruction, always ask your healthcare professional. [address] any warranty or liability for your use of this information. Low Sodium Diet (2,000 Milligram): Care Instructions  Your Care Instructions    Too much sodium causes your body to hold on to extra water. This can raise your blood pressure and force your heart and kidneys to work harder. In very serious cases, this could cause you to be put in the hospital. It might even be life-threatening. By limiting sodium, you will feel better and lower your risk of serious problems. The most common source of sodium is salt. People get most of the salt in their diet from canned, prepared, and packaged foods. Fast food and restaurant meals also are very high in sodium. Your doctor will probably limit your sodium to less than 2,000 milligrams (mg) a day. This limit counts all the sodium in prepared and packaged foods and any salt you add to your food. Follow-up care is a key part of your treatment and safety. Be sure to make and go to all appointments, and call your doctor if you are having problems. It's also a good idea to know your test results and keep a list of the medicines you take. How can you care for yourself at home? Read food labels  · Read labels on cans and food packages. The labels tell you how much sodium is in each serving. Make sure that you look at the serving size. If you eat more than the serving size, you have eaten more sodium.   · Food labels also tell you the Percent Daily Value for sodium. Choose products with low Percent Daily Values for sodium. · Be aware that sodium can come in forms other than salt, including monosodium glutamate (MSG), sodium citrate, and sodium bicarbonate (baking soda). MSG is often added to Asian food. When you eat out, you can sometimes ask for food without MSG or added salt. Buy low-sodium foods  · Buy foods that are labeled \"unsalted\" (no salt added), \"sodium-free\" (less than 5 mg of sodium per serving), or \"low-sodium\" (less than 140 mg of sodium per serving). Foods labeled \"reduced-sodium\" and \"light sodium\" may still have too much sodium. Be sure to read the label to see how much sodium you are getting. · Buy fresh vegetables, or frozen vegetables without added sauces. Buy low-sodium versions of canned vegetables, soups, and other canned goods. Prepare low-sodium meals  · Cut back on the amount of salt you use in cooking. This will help you adjust to the taste. Do not add salt after cooking. One teaspoon of salt has about 2,300 mg of sodium. · Take the salt shaker off the table. · Flavor your food with garlic, lemon juice, onion, vinegar, herbs, and spices. Do not use soy sauce, lite soy sauce, steak sauce, onion salt, garlic salt, celery salt, mustard, or ketchup on your food. · Use low-sodium salad dressings, sauces, and ketchup. Or make your own salad dressings and sauces without adding salt. · Use less salt (or none) when recipes call for it. You can often use half the salt a recipe calls for without losing flavor. Other foods such as rice, pasta, and grains do not need added salt. · Rinse canned vegetables, and cook them in fresh water. This removes some--but not all--of the salt. · Avoid water that is naturally high in sodium or that has been treated with water softeners, which add sodium. Call your local water company to find out the sodium content of your water supply.  If you buy bottled water, read the label and choose a sodium-free brand. Avoid high-sodium foods  · Avoid eating:  ? Smoked, cured, salted, and canned meat, fish, and poultry. ? Ham, mays, hot dogs, and luncheon meats. ? Regular, hard, and processed cheese and regular peanut butter. ? Crackers with salted tops, and other salted snack foods such as pretzels, chips, and salted popcorn. ? Frozen prepared meals, unless labeled low-sodium. ? Canned and dried soups, broths, and bouillon, unless labeled sodium-free or low-sodium. ? Canned vegetables, unless labeled sodium-free or low-sodium. ? [place] fries, pizza, tacos, and other fast foods. ? Pickles, olives, ketchup, and other condiments, especially soy sauce, unless labeled sodium-free or low-sodium. Where can you learn more? Go to http://evelin-radha.info/. Enter U669 in the search box to learn more about \"Low Sodium Diet (2,000 Milligram): Care Instructions. \"  Current as of: November 7, 2018  Content Version: 12.2  © 3101-5560 [employer]. Care instructions adapted under license by [employer] (which disclaims liability or warranty for this information). If you have questions about a medical condition or this instruction, always ask your healthcare professional. [place] [address] any warranty or liability for your use of this information.

## 2019-10-16 NOTE — PROGRESS NOTES
Patient: Faina Lutz  Date of Service: 10/15/2019   Provider: MARU Felix     REASON FOR VISIT:   Chief Complaint   Patient presents with    Anxiety        HISTORY OF PRESENT ILLNESS:   Faina Lutz is a 37 y.o. female who presents to the office for acute care. Present with complaint of increased anxiety, generalized body pain, hot flashes or increased sweating. States that she does not know what is going on with her body. States that she has been eating uncontrollably. She does not seem to be satisfied with the amount of food she. Looking at a meter she does not recognize who she sees. That she has she has gained a lot of weight recently. States that for more we have them all work she is with profusely and get very hot. She states that she has been increasingly anxious recently and does not know what happened. Admits that nothing in her life right now should caused her to be anxious as a new job that is good, she is about to go on the cruise, overall she is happy where she is right now so she does not know why she is feeling this way. States that at work her new coworkers walk at lunchtime and she started joining but her body will start to hurt, she will feel hot and start to sweat a lot so she will have to stop. Report that she is unable to do anything because she hurt all over her body. On what she wants to do is sleep. She has been sleeping 10 to 12 hours without waking up. She is on bupropion 75 mg 3 times a day and had been doing well with that dose. She was also started on buspirone 10 mg 3 times a day which she has only been taking twice a day as needed and had also been doing well with that. She denied suicidal or homicidal ideations. Has actual gained 5 pounds since last visit. During visit weight was dear wrong to patient and she was told that she has lost 4 pounds.    Change in Patient Weight by Encounter [phone])    (10/15/2019) Office Visit  Last Recorded Weight: 147.9 kg (326 lb)             Percent Change: +1.58%   [06/10/2019 to 10/15/2019 (127 Days)]    (06/10/2019) Office Visit  Last Recorded Weight: 145.6 kg (321 lb)             Percent Change: -1.22%   [05/08/2019 to 06/10/2019 (33 Days)]    (05/08/2019) Office Visit  Last Recorded Weight: 147.4 kg (325 lb)     ALLERGIES:   Allergies   Allergen Reactions    Ativan [Lorazepam] Other (comments)     Headaches     Other Plant, Animal, Environmental Other (comments)        ACTIVE MEDICAL PROBLEMS:  Patient Active Problem List   Diagnosis Code    Essential hypertension I10    Asthma J45.909    Anxiety F41.9    Chronic bilateral low back pain without sciatica M54.5, G89.29    Chest wall pain R07.89    Obesity, morbid ([address] E66.01    Depression, major, single episode, severe (HCC) F32.2        MEDICATIONS:   Current Outpatient Medications   Medication Sig Dispense Refill    albuterol (PROVENTIL HFA, VENTOLIN HFA, PROAIR HFA) 90 mcg/actuation inhaler Take 1 Puff by inhalation every four (4) hours as needed for Wheezing. 1 Inhaler 2    buPROPion XL (WELLBUTRIN XL) 150 mg tablet Take 1 Tab by mouth every morning. 30 Tab 0    lisinopril (PRINIVIL, ZESTRIL) 20 mg tablet Take 1 Tab by mouth daily. 90 Tab 0    busPIRone (BUSPAR) 10 mg tablet Take 1 Tab by mouth three (3) times daily. 90 Tab 1    furosemide (LASIX) 20 mg tablet TAKE 1 TABLET BY MOUTH DAILY AS NEEDED 30 Tab 1    montelukast (SINGULAIR) 10 mg tablet TAKE 1 TABLET BY MOUTH DAILY 30 Tab 3    chlorthalidone (HYGROTEN) 25 mg tablet Take 1 Tab by mouth daily. 90 Tab 2    PNV UP.16/FSXZQAC fum/folic ac (PRENATAL PO) Take  by mouth.  norethindrone (MICRONOR) 0.35 mg tab TK 1 T PO QD  3    fluticasone (VERAMYST) 27.5 mcg/actuation nasal spray 2 Sprays by Nasal route daily. 1 Bottle 2    FOLIC ACID PO Take  by mouth. ROS:   Pertinent positive as above in HPI. All others negative.     PHYSICAL EXAMINATION:  Visit Vitals  Pulse 93   Temp 98.4 °F (36.9 °C)   Resp 18   Ht 5' 3\" (1.6 m)   Wt 326 lb (147.9 kg)   LMP 09/20/2019   SpO2 96%   BMI 57.75 kg/m²      Body mass index is 57.75 kg/m². Appearance: Morbidly obese, alert, well appearing, and in no distress. External ears are normal.  Neck supple. No adenopathy or thyromegaly. Lungs are clear, good air entry, no wheezes, rhonchi or rales. S1 and S2 normal, no murmurs, regular rate and rhythm. Abdomen soft without tenderness, guarding, mass or organomegaly. No bruit. Extremities show no edema, normal peripheral pulses. Neurological is normal, no focal findings. Psychiatric: Oriented to time, place and person. Sad mood, crying during visit; Pleasant and cooperative. ASSESSMENT/PLAN:  Diagnoses and all orders for this visit:    1. Depression, major, single episode, severe (HCC)  -     buPROPion XL (WELLBUTRIN XL) 150 mg tablet; Take 1 Tab by mouth every morning.  -     I believe that she is depressed. Dose of bupropion XL will be increased to 150 daily. She was advised to take buspirone 3 times a day. She should follow up with psych as she need CBT. She is seen at Dr. Edvin Pinto office. Will  Call the office or go to the ED if she starts having abnormal thoughts. 2. Essential hypertension  -     HEMOGLOBIN A1C WITH EAG; Future  -     CBC WITH AUTOMATED DIFF; Future  -     METABOLIC PANEL, COMPREHENSIVE; Future  -     TSH 3RD GENERATION; Future  -     T4, FREE; Future  -     VITAMIN D, 25 HYDROXY; Future  -     lisinopril (PRINIVIL, ZESTRIL) 20 mg tablet; Take 1 Tab by mouth daily. 3. Obesity, morbid ([address]  I have reviewed/discussed the above normal BMI with the patient. I have recommended the following interventions: dietary management education, guidance, and counseling, encourage exercise, monitor weight and prescribed dietary intake . Yazan Cool 4. Panic attacks  -     busPIRone (BUSPAR) 10 mg tablet; Take 1 Tab by mouth three (3) times daily.   -      Take medication as prescribed. 5. Uncomplicated asthma, unspecified asthma severity, unspecified whether persistent  -     albuterol (PROVENTIL HFA, VENTOLIN HFA, PROAIR HFA) 90 mcg/actuation inhaler; Take 1 Puff by inhalation every four (4) hours as needed for Wheezing. 6. Excessive sweating  -     VITAMIN B12; Future      Patient advised to return to clinic if symptoms persist or to ED if they become worse. atient verbalized understanding to above instructions. Follow-up and Dispositions    · Return in about 2 weeks (around 10/29/2019).           MARU Maxwell   10/16/2019   10:22 AM

## 2019-11-11 DIAGNOSIS — F32.2 DEPRESSION, MAJOR, SINGLE EPISODE, SEVERE (HCC): ICD-10-CM

## 2019-11-14 RX ORDER — BUPROPION HYDROCHLORIDE 150 MG/1
TABLET ORAL
Qty: 30 TAB | Refills: 0 | Status: SHIPPED | OUTPATIENT
Start: 2019-11-14 | End: 2019-12-14 | Stop reason: SDUPTHER

## 2019-12-08 DIAGNOSIS — I10 ESSENTIAL HYPERTENSION: ICD-10-CM

## 2019-12-09 RX ORDER — LISINOPRIL 20 MG/1
TABLET ORAL
Qty: 90 TAB | Refills: 0 | Status: SHIPPED | OUTPATIENT
Start: 2019-12-09 | End: 2021-11-10

## 2019-12-14 DIAGNOSIS — F32.2 DEPRESSION, MAJOR, SINGLE EPISODE, SEVERE (HCC): ICD-10-CM

## 2019-12-14 DIAGNOSIS — I10 ESSENTIAL HYPERTENSION: ICD-10-CM

## 2019-12-14 DIAGNOSIS — R60.9 EDEMA, UNSPECIFIED TYPE: ICD-10-CM

## 2019-12-19 RX ORDER — BUPROPION HYDROCHLORIDE 150 MG/1
TABLET ORAL
Qty: 30 TAB | Refills: 0 | Status: SHIPPED | OUTPATIENT
Start: 2019-12-19 | End: 2021-04-15

## 2019-12-19 RX ORDER — FUROSEMIDE 20 MG/1
TABLET ORAL
Qty: 30 TAB | Refills: 0 | Status: SHIPPED | OUTPATIENT
Start: 2019-12-19 | End: 2021-04-15

## 2019-12-31 DIAGNOSIS — J45.909 UNCOMPLICATED ASTHMA, UNSPECIFIED ASTHMA SEVERITY, UNSPECIFIED WHETHER PERSISTENT: ICD-10-CM

## 2019-12-31 RX ORDER — ALBUTEROL SULFATE 90 UG/1
AEROSOL, METERED RESPIRATORY (INHALATION)
Qty: 18 G | Refills: 2 | Status: SHIPPED | OUTPATIENT
Start: 2019-12-31

## 2020-09-24 ENCOUNTER — OFFICE VISIT (OUTPATIENT)
Dept: SURGERY | Age: 35
End: 2020-09-24
Payer: COMMERCIAL

## 2020-09-24 VITALS
OXYGEN SATURATION: 95 % | TEMPERATURE: 97.1 F | BODY MASS INDEX: 51.91 KG/M2 | HEIGHT: 63 IN | SYSTOLIC BLOOD PRESSURE: 132 MMHG | HEART RATE: 79 BPM | DIASTOLIC BLOOD PRESSURE: 73 MMHG | WEIGHT: 293 LBS

## 2020-09-24 DIAGNOSIS — E66.01 MORBID OBESITY WITH BMI OF 50.0-59.9, ADULT (HCC): Primary | ICD-10-CM

## 2020-09-24 DIAGNOSIS — E66.01 MORBID OBESITY WITH BMI OF 50.0-59.9, ADULT (HCC): ICD-10-CM

## 2020-09-24 PROCEDURE — 99205 OFFICE O/P NEW HI 60 MIN: CPT | Performed by: SURGERY

## 2020-09-24 RX ORDER — HYDROXYZINE 25 MG/1
TABLET, FILM COATED ORAL
COMMUNITY
Start: 2020-07-20 | End: 2021-04-15

## 2020-09-24 RX ORDER — LISINOPRIL 20 MG/1
TABLET ORAL
COMMUNITY
Start: 2019-05-08 | End: 2021-04-15

## 2020-09-24 NOTE — PROGRESS NOTES
Laci Mccloud is a 38 y.o. female (: 1985) presenting to address:    Chief Complaint   Patient presents with    New Patient     GBP Consult       Medication list and allergies have been reviewed with Laci Mccloud and updated as of today's date. I have gone over all Medical, Surgical and Social History with Laci Mccloud and updated/added the information accordingly.

## 2020-09-24 NOTE — PROGRESS NOTES
Consult    Patient: Arline Ojeda MRN: [id number]  SSN: [id number]    YOB: 1985  Age: 38 y.o. Sex: female      Initial  Consultation for Bariatric Surgery     Arline Ojeda is a 40-year-old white female who presents for discussion of surgical options available for definitive management of her super obesity. Onset obesity: Childhood  Weight at age 23: 180 pounds and a 5 foot 3 inch frame. Maximum/current weight: 332 pounds and a 5 foot 3 inch frame with a body mass index of 58  Pattern/progression of weight gain: Slowly progressive interrupted by dietary weight loss followed by regain of the lost weight as well as additional weight thus exhibiting the yoyo effect up to her current maximum weight of 332 pounds max medical weight loss attempts: Multiple unsupervised and supervised weight loss trials with maximal loss occurring in 2007 losing 100 pounds with the assistance of phentermine over 12 months  Comorbidities: Hypertension, reactive airway disease, clinical obstructive sleep apnea, weight related arthropathyback, knees  Current weight: 332. BMI: 58. Ideal body weight: 128  Excess body weight: 204  Estimated postsurgical weight loss: 163  Postsurgical goal weight: 169. Allergies: Ativan  Current medications: See medication list  Past medical history:  1. Super obesity with a body mass index of 58 and obesity related comorbidities of hypertension, reactive airway disease, clinical obstructive sleep apnea, weight related arthropathyback, knees  2. Depression  3. Allergic rhinitis  Past surgical history:  1. Rector tooth extractions age 17  4. Urethral dilatation age 1  4. D&Cs times 2 /1987, 2019  Social history:  Tobacconone  Alcohol: 1 ounce monthly  Family history:   Mother 60hypertension, anxiety  Father 64clinically severe obesity, hypertension  Sister 35clinically severe obesity  Brother 28clinically severe obesity    Allergies   Allergen Reactions    Ativan [Lorazepam] Other (comments)     Headaches     Other Plant, Animal, Environmental Other (comments)       Current Outpatient Medications on File Prior to Visit   Medication Sig Dispense Refill    hydrOXYzine HCL (ATARAX) 25 mg tablet       VENTOLIN HFA 90 mcg/actuation inhaler INHALE 1 PUFF BY MOUTH EVERY 4 HOURS AS NEEDED FOR WHEEZING 18 g 2    lisinopril (PRINIVIL, ZESTRIL) 20 mg tablet TAKE 1 TABLET BY MOUTH EVERY DAY 90 Tab 0    lisinopriL (PRINIVIL, ZESTRIL) 20 mg tablet lisinopril 20 mg tablet   TK 1 T PO QD      furosemide (LASIX) 20 mg tablet TAKE 1 TABLET BY MOUTH DAILY AS NEEDED 30 Tab 0    buPROPion XL (WELLBUTRIN XL) 150 mg tablet TAKE 1 TABLET BY MOUTH EVERY MORNING 30 Tab 0    montelukast (SINGULAIR) 10 mg tablet TAKE 1 TABLET BY MOUTH DAILY 30 Tab 4    busPIRone (BUSPAR) 10 mg tablet Take 1 Tab by mouth three (3) times daily. 90 Tab 1    chlorthalidone (HYGROTEN) 25 mg tablet Take 1 Tab by mouth daily. 90 Tab 2    PNV KV.59/GMTTXWB fum/folic ac (PRENATAL PO) Take  by mouth.  FOLIC ACID PO Take  by mouth.  norethindrone (MICRONOR) 0.35 mg tab TK 1 T PO QD  3    fluticasone (VERAMYST) 27.5 mcg/actuation nasal spray 2 Sprays by Nasal route daily. 1 Bottle 2     No current facility-administered medications on file prior to visit. Past Medical History:   Diagnosis Date    Anxiety     Asthma     Depression     Hypertension        Past Surgical History:   Procedure Laterality Date    HX DILATION AND EVACUATION  03/22/2019    HX OTHER SURGICAL      Bladder surger 1990    HX WISDOM TEETH EXTRACTION         Social History     Tobacco Use    Smoking status: Never Smoker    Smokeless tobacco: Never Used   Substance Use Topics    Alcohol use:  Yes     Alcohol/week: 1.0 standard drinks     Types: 1 Cans of beer per week     Comment: socially    Drug use: No       Family History   Problem Relation Age of Onset    Hypertension Mother     Hypertension Father    Dima Other Brother Born with heart murmur    Cancer Maternal Grandmother         Breast    Diabetes Maternal Grandfather          Review of Systems:      General: Denies fevers, chills, night sweats, fatigue, weight loss, or weight gain. HEENT: Denies changes in auditory or visual acuity, recurrent pharyngitis, epistaxis, chronic rhinorrhea, vertigo    Respiratory: Denies increasing shortness of breath, productive cough, hemoptysis    Cardiac: Denies known history of cardiac disease, heart murmur, palpitations    GI: Denies dysphagia, recurrent emesis, hematemesis, changes in bowel habits, hematochezia, melena    : Denies hematuria frequency urgency dysuria    Musculoskeletal: Denies fractures, dislocations    Neurologic: Denies history of CVA, paralysis paresthesias, recurrent cephalgia, seizures    Endocrine: Denies polyuria, polydipsia, polyphagia, heat and cold intolerance    Lymph/heme: Denies a history of malignancy, anemia, bruising, blood transfusions    Integumentary: Negative for dermatitis         Physical Exam    Visit Vitals  /73 (BP 1 Location: Right arm, BP Patient Position: Sitting)   Pulse 79   Temp 97.1 °F (36.2 °C)   Ht 5' 3\" (1.6 m)   Wt 150.6 kg (332 lb)   LMP 09/24/2020 (Exact Date)   SpO2 95%   BMI 58.81 kg/m²       Nursing note reviewed. General: Clinically severely obese in no acute distress, nontoxic in appearance. Head: Normocephalic, atraumatic  Mouth: Clear, no overt lesions, oral mucosa is pink and moist.  Neck: Supple, no masses, no adenopathy or carotid bruits, trachea midline  Resp: Clear to auscultation bilaterally, no wheezing, rhonchi, or rales, excursions normal and symmetrical.  Cardio: Regular rate and rhythm, no murmurs, clicks, gallops, or rubs. Abdomen: Obese, soft, nontender, nondistended, normoactive bowel sounds, no hernias.   Extremities: Warm, well perfused, no tenderness or swelling, normal gait/station, without edema or varicosities  Neuro: Sensation and strength grossly intact and symmetrical.  Psych: Alert and oriented to person, place, and time. Impression/Plan:      53-year-old white female with a [employer] Ascent is a 60 and obesity related comorbidities consisting of hypertension, reactive airway disease, clinical obstructive sleep apnea, weight related arthropathyback, knees who would benefit from bariatric surgery. We have had an extensive discussion with regard to the risks, benefits and likely outcomes of the operation. We've discussed the restrictive and malabsorptive nature of the gastric bypass and compared and contrasted with the sleeve gastrectomy. The patient understands the likelihood of losing approximately 80% of their excess weight in 12 to 18 months. The patient also understands the risks including but not limited to bleeding, infection, need for reoperation, ulcers, leaks and strictures, bowel obstruction secondary to adhesions and internal hernias, DVT, PE, heart attack, stroke, and death. Patient also understands risks of inadequate weight loss, excess weight loss, vitamin insufficiency, protein malnutrition, excess skin, and loss of hair. We have reviewed the components of a successful postoperative course including requirement for a high protein, low carbohydrate diet, 60 oz a day of zero calorie liquids, daily vitamin supplementation, daily exercise, regular follow-up, and participation in support groups.  At this time we will enroll the patient in our bariatric program, undertake routine laboratory evaluation, chest X-ray, EKG, possible UGI and evaluation by  nutritionist as well as psychologist and pending their satisfactory completion of the preop evaluation, plan to pursue laparoscopic potentially open gastric bypass to achieve definitive durable weight loss on a personal level with expected resolution of obesity related comorbidities

## 2020-10-02 DIAGNOSIS — E66.01 MORBID OBESITY WITH BMI OF 50.0-59.9, ADULT (HCC): ICD-10-CM

## 2020-10-02 DIAGNOSIS — Z01.818 PRE-OP TESTING: ICD-10-CM

## 2020-10-02 DIAGNOSIS — E66.01 MORBID OBESITY WITH BMI OF 50.0-59.9, ADULT (HCC): Primary | ICD-10-CM

## 2020-10-12 ENCOUNTER — HOSPITAL ENCOUNTER (OUTPATIENT)
Dept: LAB | Age: 35
Discharge: HOME | End: 2020-10-12
Attending: NURSE PRACTITIONER
Payer: COMMERCIAL

## 2020-10-12 ENCOUNTER — TRANSCRIBE ORDER (OUTPATIENT)
Dept: REGISTRATION | Age: 35
End: 2020-10-12

## 2020-10-12 ENCOUNTER — HOSPITAL ENCOUNTER (OUTPATIENT)
Dept: PREADMISSION TESTING | Age: 35
Discharge: HOME | End: 2020-10-12
Payer: COMMERCIAL

## 2020-10-12 ENCOUNTER — HOSPITAL ENCOUNTER (OUTPATIENT)
Dept: GENERAL RADIOLOGY | Age: 35
Discharge: HOME | End: 2020-10-12
Attending: NURSE PRACTITIONER
Payer: COMMERCIAL

## 2020-10-12 DIAGNOSIS — E66.01 MORBID OBESITY WITH BMI OF 50.0-59.9, ADULT (HCC): ICD-10-CM

## 2020-10-12 DIAGNOSIS — Z01.818 PRE-OP TESTING: ICD-10-CM

## 2020-10-12 DIAGNOSIS — E66.01 MORBID OBESITY (HCC): Primary | ICD-10-CM

## 2020-10-12 DIAGNOSIS — E66.01 MORBID OBESITY (HCC): ICD-10-CM

## 2020-10-12 LAB
25(OH)D3 SERPL-MCNC: 28.6 NG/ML (ref 30–100)
ALBUMIN SERPL-MCNC: 3.5 G/DL (ref 3.4–5)
ALBUMIN/GLOB SERPL: 1.2 {RATIO} (ref 0.8–1.7)
ALP SERPL-CCNC: 111 U/L (ref 45–117)
ALT SERPL-CCNC: 22 U/L (ref 13–56)
ANION GAP SERPL CALC-SCNC: 5 MMOL/L (ref 3–18)
APPEARANCE UR: CLEAR
AST SERPL-CCNC: 10 U/L (ref 10–38)
ATRIAL RATE: 64 BPM
BACTERIA URNS QL MICRO: ABNORMAL /HPF
BILIRUB SERPL-MCNC: 0.4 MG/DL (ref 0.2–1)
BILIRUB UR QL: NEGATIVE
BUN SERPL-MCNC: 14 MG/DL (ref 7–18)
BUN/CREAT SERPL: 20 (ref 12–20)
CALCIUM SERPL-MCNC: 8.9 MG/DL (ref 8.5–10.1)
CALCULATED P AXIS, ECG09: 44 DEGREES
CALCULATED R AXIS, ECG10: -37 DEGREES
CALCULATED T AXIS, ECG11: 31 DEGREES
CHLORIDE SERPL-SCNC: 107 MMOL/L (ref 100–111)
CO2 SERPL-SCNC: 28 MMOL/L (ref 21–32)
COLOR UR: YELLOW
CREAT SERPL-MCNC: 0.71 MG/DL (ref 0.6–1.3)
DIAGNOSIS, 93000: NORMAL
EPITH CASTS URNS QL MICRO: ABNORMAL /LPF (ref 0–5)
ERYTHROCYTE [DISTWIDTH] IN BLOOD BY AUTOMATED COUNT: 13.8 % (ref 11.6–14.5)
FERRITIN SERPL-MCNC: 27 NG/ML (ref 8–388)
FOLATE SERPL-MCNC: 11.3 NG/ML (ref 3.1–17.5)
GLOBULIN SER CALC-MCNC: 2.9 G/DL (ref 2–4)
GLUCOSE SERPL-MCNC: 120 MG/DL (ref 74–99)
GLUCOSE UR STRIP.AUTO-MCNC: NEGATIVE MG/DL
HCT VFR BLD AUTO: 41.5 % (ref 35–45)
HGB BLD-MCNC: 13.4 G/DL (ref 12–16)
HGB UR QL STRIP: ABNORMAL
IRON SERPL-MCNC: 51 UG/DL (ref 50–175)
KETONES UR QL STRIP.AUTO: NEGATIVE MG/DL
LEUKOCYTE ESTERASE UR QL STRIP.AUTO: NEGATIVE
MCH RBC QN AUTO: 28.1 PG (ref 24–34)
MCHC RBC AUTO-ENTMCNC: 32.3 G/DL (ref 31–37)
MCV RBC AUTO: 87 FL (ref 74–97)
NITRITE UR QL STRIP.AUTO: NEGATIVE
P-R INTERVAL, ECG05: 146 MS
PH UR STRIP: 6 [PH] (ref 5–8)
PLATELET # BLD AUTO: 250 K/UL (ref 135–420)
PMV BLD AUTO: 11.4 FL (ref 9.2–11.8)
POTASSIUM SERPL-SCNC: 4.5 MMOL/L (ref 3.5–5.5)
PROT SERPL-MCNC: 6.4 G/DL (ref 6.4–8.2)
PROT UR STRIP-MCNC: NEGATIVE MG/DL
Q-T INTERVAL, ECG07: 398 MS
QRS DURATION, ECG06: 94 MS
QTC CALCULATION (BEZET), ECG08: 410 MS
RBC # BLD AUTO: 4.77 M/UL (ref 4.2–5.3)
RBC #/AREA URNS HPF: ABNORMAL /HPF (ref 0–5)
SODIUM SERPL-SCNC: 140 MMOL/L (ref 136–145)
SP GR UR REFRACTOMETRY: 1.02 (ref 1–1.03)
UROBILINOGEN UR QL STRIP.AUTO: 0.2 EU/DL (ref 0.2–1)
VENTRICULAR RATE, ECG03: 64 BPM
VIT B12 SERPL-MCNC: 444 PG/ML (ref 211–911)
WBC # BLD AUTO: 6.3 K/UL (ref 4.6–13.2)
WBC URNS QL MICRO: ABNORMAL /HPF (ref 0–4)

## 2020-10-12 PROCEDURE — 82306 VITAMIN D 25 HYDROXY: CPT

## 2020-10-12 PROCEDURE — 83540 ASSAY OF IRON: CPT

## 2020-10-12 PROCEDURE — 36415 COLL VENOUS BLD VENIPUNCTURE: CPT

## 2020-10-12 PROCEDURE — 80053 COMPREHEN METABOLIC PANEL: CPT

## 2020-10-12 PROCEDURE — 85027 COMPLETE CBC AUTOMATED: CPT

## 2020-10-12 PROCEDURE — 74011000250 HC RX REV CODE- 250: Performed by: NURSE PRACTITIONER

## 2020-10-12 PROCEDURE — 82728 ASSAY OF FERRITIN: CPT

## 2020-10-12 PROCEDURE — 71046 X-RAY EXAM CHEST 2 VIEWS: CPT

## 2020-10-12 PROCEDURE — 74246 X-RAY XM UPR GI TRC 2CNTRST: CPT

## 2020-10-12 PROCEDURE — 84425 ASSAY OF VITAMIN B-1: CPT

## 2020-10-12 PROCEDURE — 82607 VITAMIN B-12: CPT

## 2020-10-12 PROCEDURE — 81001 URINALYSIS AUTO W/SCOPE: CPT

## 2020-10-12 PROCEDURE — 93005 ELECTROCARDIOGRAM TRACING: CPT

## 2020-10-12 PROCEDURE — 86677 HELICOBACTER PYLORI ANTIBODY: CPT

## 2020-10-12 PROCEDURE — 74011000255 HC RX REV CODE- 255: Performed by: NURSE PRACTITIONER

## 2020-10-12 RX ADMIN — ANTACID/ANTIFLATULENT 4 G: 380; 550; 10; 10 GRANULE, EFFERVESCENT ORAL at 07:46

## 2020-10-12 RX ADMIN — BARIUM SULFATE 135 ML: 980 POWDER, FOR SUSPENSION ORAL at 07:46

## 2020-10-12 RX ADMIN — BARIUM SULFATE 176 G: 960 POWDER, FOR SUSPENSION ORAL at 07:47

## 2020-10-13 ENCOUNTER — TELEPHONE (OUTPATIENT)
Dept: SURGERY | Age: 35
End: 2020-10-13

## 2020-10-13 LAB
H PYLORI IGA SER-ACNC: <9 UNITS (ref 0–8.9)
H PYLORI IGM SER-ACNC: <9 UNITS (ref 0–8.9)

## 2020-10-13 NOTE — TELEPHONE ENCOUNTER
Advised patient to start taking vitamin D3 5000 iu daily.  Patient verbalized understanding.     ----- Message from Maylin Buenrostro DO sent at 10/13/2020 10:14 AM EDT -----  Needs vitamin D supplementation

## 2020-10-18 LAB — VIT B1 BLD-SCNC: 105.8 NMOL/L (ref 66.5–200)

## 2020-10-26 ENCOUNTER — DOCUMENTATION ONLY (OUTPATIENT)
Dept: SURGERY | Age: 35
End: 2020-10-26

## 2020-10-26 ENCOUNTER — HOSPITAL ENCOUNTER (OUTPATIENT)
Dept: BARIATRICS/WEIGHT MGMT | Age: 35
Discharge: HOME | End: 2020-10-26

## 2020-10-26 NOTE — PROGRESS NOTES
[facility]  [id number] [place] [address] 88  [place], [address] [place] [address]    Patient's Name: Alexandria Butts   Age: 38 y.o. YOB: 1985   Sex: female    Date:  10/26/2020    Session: 1 of  6  Surgeon:  Dr. Alex Singer    Height: 5'3\" Weight:    332   Lbs. Starting Weight: 332       Lbs. BMI: 58    Do you smoke? no    Alcohol intake:    Very rarely. Class Guidelines    Guidelines are reviewed with patient at the start of every class. 1. Patient understands that weight loss trial classes must be consecutive. Patient understands if they miss a class, it is their responsibility to contact me to reschedule class. I will reach out to patient after their first no show. 2.  Patient understands the expectations that weight maintenance/weight loss is expected during the classes. Failure to demonstrate changes may result in one extra month of weight loss trial, followed by going back to see the surgeon. 3. Patient is also instructed to be doing their labs, blood work, psych visit, support group and any other test that the surgeon has used while they are working on their weight loss trial.  4. Patient is instructed to bring their education binder to all classes. Eating Habits and Behaviors      Today we reviewed key diet principles. We talked about patient following a low calorie/low carbohydrate diet while they are in weight loss trials. To achieve this, patient is encouraged to avoid liquid calories, including alcohol, soda, sweet tea, and fruit juices. Patient can cut carbohydrates by trying to stick to meat and vegetables. Patient can also eat eggs, cheese, and good fat, while trying to eliminate starches, such as pasta, rice, crackers, chips, popcorn.     Some of the food-related suggestions included drinking plenty of water or calorie-free beverages prior to their meal.  Patient is encouraged to to fill up on protein first, which is the ultimate fill-me up food. We talked about the importance of eating breakfast and the effects that can happen if one skips meals, which includes eating larger portions, snacking more, and decreasing their metabolism. With the suggestions in the power point, patient will be able to decrease their calories and carbohydrate intake. Patient's current diet habits include:   2-3 meals consisting of protein, vegetables and maybe starch. Patient snack choices may be salsa, pickles, cheese, dairy, milk, deli meat. Physical Activity/Exercise    Comments: We talked about the importance of establishing a work out routine. Patient is currently doing walking  for activity. Behavior Modification       Comments:   Some of the behavior tips that were included in the power point, include being choosy about night time snacking. Patient was encouraged to make the TV a no eating zone and not eat after 7 pm.  Patient is also encouraged to keep a food journal.      I also reminded all patients to make their psychologist appointment and attend at least 1 support group. Goals for the next month include:  1. Work on portion control.     Teri Mai RD

## 2020-11-24 NOTE — PROGRESS NOTES
[facility]  [id number] [place] [address], [address] [place] [address]    Patient's Name: Cristobal Hicks   Age: 38 y.o. YOB: 1985   Sex: female    Date:   11/24/2020    Insurance:  Sheeba Pratt          Session: 2 of  6  Surgeon:  Angie Perkins    Height: 63\" Weight:    330      Lbs. BMI: 58.4   Pounds Lost since last month: 2               Pounds Gained since last month: 0    Starting Weight: 332   Previous Months Weight: 332  Overall Pounds Lost: 2 Overall Pounds Gained: 0      Do you smoke? no    Alcohol intake:  rare Number of drinks at a time:  1-2  Number of times a week: occasional monthly    Class Guidelines    Guidelines are reviewed with patient at the start of every class. 1. Patient understands that weight loss trial classes must be consecutive. Patient understands if they miss a class, it is their responsibility to contact me to reschedule class. I will reach out to patient after their first no show. 2.  Patient understands the expectations that weight maintenance/weight loss is expected during the classes. Failure to demonstrate changes may result in one extra month of weight loss trial, followed by going back to see the surgeon. 3. Patient is also instructed to be doing their labs, blood work, psych visit, support group and any other test that the surgeon has used while they are working on their weight loss trial.    Other Pertinent Information: Emergency back surgery on 11/12/2020    Changes Made Since Last Class: no sodas, limiting fried foods, cooking more at home and more water      Dietary Instruction    During today's class we continued to focus on the key diet principles. Patient was instructed to follow a low carbohydrate diet, focusing on meat and vegetables. Patient was instructed to stop liquid calories and aim for 64 ounces of water per day. In class, I also gave patient a power point on surviving the holidays.   Some of the tips included survival tips for parties, including bringing their own low carbohydrate dish to a potluck dinner and surveying the buffet line before they start filling up their plate. Patient was given cooking alternatives, including using Splenda for sugar, substituting applesauce for oil in recipes, and using low fat plain yogurt instead of sour cream in dips. Patient was also encouraged to be mindful of calories in alcohol. Patient's diet habits include: 3 meals/day -- chicken, deli meats, a side and water. Carb intake includes noodles or potatoes. Snacks ~2x/day on yogurt, bread and/or cheese. Patient is struggling to let go creamer and chocolate. Fruit intake includes, all berries, canned peaches, pineapples, and pears w/ no added syrup. Physical Activity/Exercise    Patient is currently doing none for activity. Today's power point on surviving the holidays also included tips on exercising. This included being creative during the holiday, walking stairs, mall walking, getting resistance bands. Patient was encouraged not to be afraid to excuse themselves from the table to go for a walk after they eat. Comments: slowly getting back into walking as she heals from surgery    Behavior Modification    Reinforced behavior changes to make. Patient was encouraged to keep their emotions in check. Try to HALT and focus on whether they are eating out of hunger or if they are eating out of emotions. Other eating behaviors included surveying the buffet line before starting to fill up their plate.   Patient was given a check off list and encouraged to monitor some of their eating behaviors, such as eating slowly, chewing their food thoroughly, and taking 20-30 minutes to eat a meal.    Goals that patient set for next month include:  Limit chocolate intake to have 1-2 squares of dove dark chocolate/day (switched from milk to dark  Eat smaller portions w/ smaller plates and not going back for seconds    In today's class, patient learned she can use applesauce as a substitute for butter/oil in baking.        Kit Fought, RD  11/24/2020

## 2020-11-25 ENCOUNTER — HOSPITAL ENCOUNTER (OUTPATIENT)
Dept: BARIATRICS/WEIGHT MGMT | Age: 35
Discharge: HOME | End: 2020-11-25

## 2020-12-23 ENCOUNTER — HOSPITAL ENCOUNTER (OUTPATIENT)
Dept: BARIATRICS/WEIGHT MGMT | Age: 35
Discharge: HOME | End: 2020-12-23

## 2020-12-23 NOTE — PROGRESS NOTES
[address] [facility]  [facility], [address]    Patient's Name: Moshe Cohen   Age: 38 y.o. YOB: 1985   Sex: female    Date:   12/23/2020    Insurance:  Perlita Bateman          Session: 3 of  6  Surgeon:  Cory Mullins    Height: 63\" Weight:    334      Lbs. BMI: 59    Previous Month's Weight: 330 lbs   Pounds Lost since last month: 4                 Pounds Gained since last month: 0    Starting Weight: 332 lbs       Overall Pounds Lost: 0   Overall Pounds Gained: 2      Do you smoke? Patient does not smoke. Alcohol intake:  Patient does not drink. Number of drinks at a time:  0  Number of times a week: 0    Class Guidelines    Guidelines are reviewed with patient at the start of every class. 1. Patient understands that weight loss trial classes must be consecutive. Patient understands if they miss a class, it is their responsibility to contact me to reschedule class. I will reach out to patient after their first no show. 2.  Patient understands the expectations that weight maintenance/weight loss is expected during the classes. Failure to demonstrate changes may result in one extra month of weight loss trial, followed by going back to see the surgeon. 3. Patient is also instructed to be doing their labs, blood work, psych visit, support group and any other test that the surgeon has used while they are working on their weight loss trial.      Changes Made Since Last Class: changed the yogurts she buys to high protein and low carb, soda only 1x/week, eating out only 1-2x/week as opposed to daily. Dietary Instruction    During today's class we continued to focus on the key diet principles. Patient was instructed to follow a low carbohydrate diet, focusing on meat and vegetables. Patient was instructed to stop liquid calories and aim for 64 ounces of water per day.       In class, I also gave patient a power point on increasing protein and making healthy dietary changes and swaps. Some of the tips included high protein-based snacks and meals, incorporating protein at every meal and snack, swapping out deep-fried or breaded foods for grilled, air-fried, broiled, steamed or baked alternatives. Patient was given cooking alternatives, including low carb options for marinades and seasonings, and non-starchy vegetables. Patient's diet habits include: Ounces of fluids/day: 32-40 oz water/day, milk 2-4 cups daily and coffee 2 cups average  How many meals daily? 3  What meals consist of --  Eggs, meats, cheese, potatoes, broccoli, bread, soups  Carb sources: breads (for sandwiches), potatoes, soup  Food struggling to let go: chocolate, chips and dip  Snacks: 1-2x/day, yogurt, cheese and chips or fruit    Physical Activity/Exercise    Patient is currently doing walking in 10 min increments  for activity. Today's power point was on introducing exercise and movement in increments. This included being creative -- finding small exercise videos through YouTube or signing up for workout Apps, trying chair or couch exercises. Patient was continually encouraged to park further away when shopping, taking the stairs over the elevator or escalator and incorporating small weights into movement. Patient was encouraged start with small changes and work their way up to their exercise goals. Comments: recently had back surgery and has gotten cleared to start movement and light exercise    Behavior Modification    In today's class, we reinforced and focused on behavior changes to make. Patient was encouraged to plan their meals and take time to pack a lunch or breakfast to stay on track. Patient was continually encouraged to monitor some of their eating behaviors, such as limiting their distractions while eating, not skipping meals, practice mindful eating and listening to their physical hunger and fullness cues.  Patient was also encouraged to track food intake, specifically carbohydrates and proteins through a food journal or a food tracking kindra. Goals that patient set for next month include:  Walking at least 3x/week  Less sugar and more water  Buy a water bottle with a tracker on it to motivate herself      In today's class, patient learned the importance of gradual small changes to help making lifelong lifestyle changes and be most successful with surgery and weight loss.        Radha Long, BONIFACIO  12/23/2020

## 2021-01-20 ENCOUNTER — HOSPITAL ENCOUNTER (OUTPATIENT)
Dept: BARIATRICS/WEIGHT MGMT | Age: 36
Discharge: HOME | End: 2021-01-20

## 2021-01-20 NOTE — PROGRESS NOTES
[facility]  [facility], [address]    Patient's Name: Raghav Husbands   Age: 38 y.o. YOB: 1985   Sex: female    Date:   1/20/2021    Insurance:  Alice Pleitez          Session: 4 of 6  Surgeon:  Jameson Burkitt    Height: 63\" Weight:    335    Lbs. BMI: 59.3     Previous Month's Weight: 334 lbs  Pounds Lost since last month: 0                 Pounds Gained since last month: 1    Starting Weight: 332 lbs     Overall Pounds Lost: 0   Overall Pounds Gained: 3      Do you smoke? Patient does not smoke. Alcohol intake: Rare  Number of drinks at a time:  1-2  Number of times a week: socially; maybe 1x/month    Class Guidelines    Guidelines are reviewed with patient at the start of every class. 1. Patient understands that weight loss trial classes must be consecutive. Patient understands if they miss a class, it is their responsibility to contact me to reschedule class. I will reach out to patient after their first no show. 2.  Patient understands the expectations that weight maintenance/weight loss is expected during the classes. Failure to demonstrate changes may result in one extra month of weight loss trial, followed by going back to see the surgeon. 3. Patient is also instructed to be doing their labs, blood work, psych visit, support group and any other test that the surgeon has used while they are working on their weight loss trial.    Changes Made: protein drink in coffee to replace creamer; No longer eating cereal, drinking more water, purchased a time tracker water bottle      Dietary Instruction    During today's class we continued to focus on the key diet principles. Patient was instructed to follow a low carbohydrate diet, focusing on meat and vegetables. Patient was instructed to stop liquid calories and aim for 64 ounces of water per day.  We focused on focusing in on bigger problem areas to start making changes to, such as reducing fast food intake, reducing carbonated beverages/soda intake, decreasing carbohydrates intake daily, etc. We reviewed protein shakes and high protein yogurts to chose, as well. Patient's diet habits include: Patient is eating 3 meals/day, such as Two Good yogurt + 2 eggs for breakfast + protein shake with coffee. Lunch- deli meat with cheese and salsa + tortilla chips. Dinner- chicken or red meat w/ a side, usually carb-based, like potatoes or noodles, but working on changing the sides to be non-starchy veggies. Snacking 2-3x/day on cheese, salsa, or a sweet at night. Patient reports drinking water and Fairlife milk. She reports she will start measuring how much water she is drinking during the day once her tracker water bottle comes in. Struggling to give up salsa and chips, believes she replaced her chocolate addiction with salsa. Physical Activity/Exercise    Comments:     Currently for exercise, patient is walking 3x/week for 15 minutes. We talked about activities for patient to do, including walking, swimming, or chair exercises. Behavior Modification  Comments:   During class, I reviewed a power point with patients called, \"Assessing Your Readiness to Change. \"  During this power point, patient was asked to self-evaluate themselves. At the end, we tallied the scores to determine how ready they are to make changes for the surgery. For the New Year's, I had patient set New Year's resolutions, including a food-related goal, exercise-related goal, and behavior goal.  Patient was encouraged to track the goals on a daily basis using the check off list I provided. Goals should be SMART, specific, measurable, attainable, realistic, and time-orientated. Patient's Goals are:  1. Behavior-Related Goal: Smaller portions at meals, so using a small plate and not going back for seconds. 2. Food-related goal: Start tracking water intake and to drink 64 oz/day.    3. Exercise-related goal: Getting an elliptical- wants to walk 15 minutes at least 3x/week for this month. Patient learned the importance of setting SMART goals in today's class + different ways to work towards eliminating drinking caffeinated beverages.     Bradford Osman, RD  1/20/2021

## 2021-01-21 ENCOUNTER — VIRTUAL VISIT (OUTPATIENT)
Dept: SURGERY | Age: 36
End: 2021-01-21
Payer: COMMERCIAL

## 2021-01-21 VITALS — BODY MASS INDEX: 51.91 KG/M2 | HEIGHT: 63 IN | WEIGHT: 293 LBS

## 2021-01-21 DIAGNOSIS — E66.01 MORBID OBESITY WITH BMI OF 50.0-59.9, ADULT (HCC): Primary | ICD-10-CM

## 2021-01-21 DIAGNOSIS — I10 ESSENTIAL HYPERTENSION: ICD-10-CM

## 2021-01-21 DIAGNOSIS — J45.909 UNCOMPLICATED ASTHMA, UNSPECIFIED ASTHMA SEVERITY, UNSPECIFIED WHETHER PERSISTENT: ICD-10-CM

## 2021-01-21 PROCEDURE — 99213 OFFICE O/P EST LOW 20 MIN: CPT | Performed by: NURSE PRACTITIONER

## 2021-01-21 RX ORDER — GABAPENTIN 300 MG/1
CAPSULE ORAL
COMMUNITY
Start: 2020-12-04 | End: 2021-07-08 | Stop reason: CLARIF

## 2021-01-21 NOTE — PATIENT INSTRUCTIONS
Vitamin D deficiency: Start Vit D 3 5000 units daily Studies to date: 
 
Labs: significant for glucose 120, Vit D 28.6 EKG:Normal sinus rhythm with sinus arrhythmia Left axis deviation Nutritional evaluation: 4/6; start wt: 332 Psychiatric evaluation:needs to be preformed/obtained Support Group: needs to attend Additional evaluations: CXR: WNL  
UGI: Normal air contrast evaluation of the upper GI tract PAP results: need copy of results PCP clearance: needs to be preformed/obtained

## 2021-01-21 NOTE — PROGRESS NOTES
Consent:  Alfie Aldana Lori  and/or her healthcare decision maker is aware that this patient-initiated Telehealth encounter is a billable service, with coverage as determined by her insurance carrier. She is aware that she may receive a bill and has provided verbal consent to proceed: Yes    Services were provided through a video synchronous discussion virtually to substitute for in-person clinic visit. Pursuant to the emergency declaration under the [facility], Count includes the [facility] waiver authority and the [employer] Act, this Virtual  Visit was conducted, with patient's consent, to reduce the patient's risk of exposure to COVID-19 and provide continuity of care for an established patient. Provider location while conducting visit: at home   Patient location: Home  Other person participating in the telehealth services: Farhat Padilla    This virtual visit was conducted via interactive/real-time audio/video using [employer]. Me   Visit start: 1000  Visit end: 1020            Bariatric Preoperative Progress Note      Subjective:     Haleigh Gamez is a 38 y.o. female who presents today for followup of their candidacy for bariatric surgery. Since last seen, Haleigh Gamez has been working through bariatric program towards sleeve with Dr. Sis Bowden. Past Medical History:   Diagnosis Date    Anxiety     Asthma     Depression     Hypertension        Past Surgical History:   Procedure Laterality Date    HX DILATION AND EVACUATION  03/22/2019    HX OTHER SURGICAL      Bladder surger 1990    HX WISDOM TEETH EXTRACTION         Current Outpatient Medications   Medication Sig Dispense Refill    gabapentin (NEURONTIN) 300 mg capsule Take 1 PO TID      lisinopriL (PRINIVIL, ZESTRIL) 20 mg tablet lisinopril 20 mg tablet   TK 1 T PO QD      chlorthalidone (HYGROTEN) 25 mg tablet Take 1 Tab by mouth daily.  90 Tab 2    hydrOXYzine HCL (ATARAX) 25 mg SURGICAL CONSULTATION    DATE OF CONSULT: 9/18/2018    Primary Care Physician:  Michael Marinelli MD  Requesting Provider: Michael Marinelli MD     Reason for Request:   Chief Complaint   Patient presents with   • Consultation     right inguinal hernia and colonoscopy        HISTORY OF PRESENT ILLNESS: Jonny Raymond is a 63 year old male who presents at the request of Michael Marinelli MD for right groin pain.  Patient first noticed a right groin bulge in June. Patient denies straining, lifting, or coughing that caused the right groin bulge. He is not able to reduce it. No fevers, no chills, no nausea, no vomiting. No chronic cough or constipation. No urinary hesitancy or straining. No prior abdominal surgeries. Patient is otherwise healthy.      Patient reports he has never had a colonoscopy. Denies any rectal bleeding or constipation or any other bowel issues. Denies any weight loss. Denies any family history of any colorectal malignancies.    Past Medical History:   Diagnosis Date   • Right knee DJD         Past Surgical History:   Procedure Laterality Date   • Knee surgery Right     acl repair   • Rotator cuff repair Right        No current outpatient prescriptions on file.     No current facility-administered medications for this visit.         ALLERGIES:  No Known Allergies     Social History     Social History   • Marital status:      Spouse name: N/A   • Number of children: N/A   • Years of education: N/A     Occupational History   •       Social History Main Topics   • Smoking status: Never Smoker   • Smokeless tobacco: Never Used   • Alcohol use No   • Drug use: No   • Sexual activity: Yes     Partners: Female     Other Topics Concern   • None     Social History Narrative   • None       Family History   Problem Relation Age of Onset   • Cancer, Breast Mother    • Coronary Artery Disease Father         60's   • Diabetes Maternal Grandmother    • Cancer, Colon Neg Hx    • Cancer, Prostate Neg  Hx          REVIEW OF SYSTEMS:   Constitutional:  Denies weight loss, denies sensitivity to hot or cold, Reports skin tumors/lumps, denies sleeping difficulty, denies change in appetite, denies high/low blood sugar, denies fevers or night sweats   HEENT:  Denies frequent headaches, denies dentures, denies deafness, denies ear infection, denies sore throat, denies epistaxis, denies sinus discharge, denies motion sickness, denies eyeglasses, denies neck lumps, denies blurred vision, denies double vision  Cardiorespiratory:  Denies smoking, denies chronic cough, denies SOB (shortness of breath), denies HTN (hypertension), denies racing heart, denies ankle swelling, denies varicose veins, denies heart murmur, denies pain in legs with exercise, denies DVT (deep vein thrombosis), denies CHF (congestive heart failure), denies palpitations   Gastrointestinal:  Denies nausea/vomiting, denies abdominal pain, denies heartburn, denies hemorrhoids, denies hepatitis, denies rectal pain, denies jaundice, denies constipation, denies history of colon polyps, denies melena/hematochezia, denies diarrhea  Genitourinary:  Denies kidney stones, denies kidney/bladder infections, denies dysuria, denies hematuria, denies difficulty urinating, denies urinary frequency  Musculoskeletal:  Denies aching muscles, denies aching joints, denies back/shoulder pain, denies broken bones  Neurologic:  Denies faintness, denies convulsions, denies numbness, denies nervousness, denies depression, denies paralysis, denies frequent loss of balance, denies crying spells, denies trouble concentrating    Reproductive:     Males:  Denies sores on penis, denies swelling in testicles, denies discharge    from penis, denies prostate disease, denies family history of prostate cancer.    Skin:  Denies new rashes or lesions, denies black moles, denies nonhealing sores    Allergic/Immunologic:  Denies recurrent infections or hypersensitivity      PHYSICAL EXAM:  Visit  tablet       VENTOLIN HFA 90 mcg/actuation inhaler INHALE 1 PUFF BY MOUTH EVERY 4 HOURS AS NEEDED FOR WHEEZING 18 g 2    furosemide (LASIX) 20 mg tablet TAKE 1 TABLET BY MOUTH DAILY AS NEEDED 30 Tab 0    buPROPion XL (WELLBUTRIN XL) 150 mg tablet TAKE 1 TABLET BY MOUTH EVERY MORNING 30 Tab 0    lisinopril (PRINIVIL, ZESTRIL) 20 mg tablet TAKE 1 TABLET BY MOUTH EVERY DAY 90 Tab 0    montelukast (SINGULAIR) 10 mg tablet TAKE 1 TABLET BY MOUTH DAILY 30 Tab 4    busPIRone (BUSPAR) 10 mg tablet Take 1 Tab by mouth three (3) times daily. 90 Tab 1    PNV FH.52/INZEXPA fum/folic ac (PRENATAL PO) Take  by mouth.  FOLIC ACID PO Take  by mouth.  norethindrone (MICRONOR) 0.35 mg tab TK 1 T PO QD  3    fluticasone (VERAMYST) 27.5 mcg/actuation nasal spray 2 Sprays by Nasal route daily.  1 Bottle 2       Allergies   Allergen Reactions    Ativan [Lorazepam] Other (comments)     Headaches     Other Plant, Animal, Environmental Other (comments)       Review of Systems:  Positive in BOLD  CONST: Fever, weight loss, fatigue or chills  GI: Nausea, vomiting, abdominal pain, change in bowel habits, hematochezia, melena, and GERD   INTEG: Dermatitis, abnormal moles  HEENT: Recent changes in vision, vertigo, epistaxis, dysphagia and hoarseness  CV: Chest pain, palpitations, HTN, edema and varicosities  RESP: Cough, shortness of breath, wheezing, hemoptysis, snoring and reactive airway disease,   : Hematuria, dysuria, frequency, urgency, nocturia and stress urinary incontinence   MS: Weakness, joint pain -knees back and arthritis  ENDO: Diabetes, thyroid disease, polyuria, polydipsia, polyphagia, poor wound healing, heat intolerance, cold intolerance  LYMPH/HEME: Anemia, bruising and history of blood transfusions  NEURO: Dizziness, headache, fainting, seizures and stroke  PSYCH: Anxiety and depression      Objective:     Physical Exam:  Visit Vitals  Ht 5' 3\" (1.6 m)   Wt 150.6 kg (332 lb)   BMI 58.81 kg/m² Physical Exam  Vitals signs reviewed. Constitutional:       Appearance: She is obese. HENT:      Head: Normocephalic and atraumatic. Pulmonary:      Effort: Pulmonary effort is normal.   Neurological:      Mental Status: She is alert and oriented to person, place, and time. Psychiatric:         Mood and Affect: Mood and affect normal.         Studies to date:    Labs: significant for glucose 120, Vit D 28.6     EKG:Normal sinus rhythm with sinus arrhythmia   Left axis deviation      Nutritional evaluation: 4/6; start wt: 332    Psychiatric evaluation:needs to be preformed/obtained     Support Group: needs to attend     Additional evaluations:  CXR: WNL   UGI: Normal air contrast evaluation of the upper GI tract  PAP results: need copy of results   PCP clearance: needs to be preformed/obtained     Assessment:   Estephania Myers is a 38 y.o. female who is progressing through the bariatric preoperative evaluation. Plan:   -complete remainder of preop evaluation including remaining WLT, psych clearance, PCP clearance, PAP results/notes, SG   Vitamin D deficiency: Start Vit D 3 5000 units daily PO    - patient communicates understanding that the expectation is to lose or maintain weight during WLT. Weight gain may result in delay or cancellation of surgery.   -Follow up once has completed entirety of weight loss workup to determine next steps.       MRAU Alvarez-BC Vitals  BP (!) 142/91   Pulse 80   Wt 83.6 kg   BMI 28.45 kg/m²     Body mass index is 28.45 kg/m².  General: Healthy appearing in no acute distress. Well nourished, well developed.    Abdomen: Soft, nontender, nondistended. No hepatomegaly or splenomegaly.  No palpable masses.   Genitourinary:  Normal male external genitalia with normal testes bilaterally. In the right inguinal area there is a sizable but reducible right inguinal hernia. There is no evidence of incarceration or strangulation. In the left inguinal canal on Valsalva maneuver there is perhaps a slight bulge of a self reducing asymptomatic inguinal hernia.  Neurologic: Gross motor and sensory function is intact; no facial asymmetries.  Psychiatric: Patient is alert and oriented x3, answering questions appropriately and has normal affect.       ASSESSMENT AND PLAN:  Jonny Raymond is a 63 year old male with a large right reducible inguinal  hernia.  Recommend robotic-assisted laparoscopic right inguinal hernia possible bilateral inguinal hernia repair with mesh    The procedure, risks, benefits and alternatives were explained to the patient. The risks include but are not limited to: infection, bleeding, recurrence, loss of testicle, chronic pain or numbness, risk of injury to surrounding organs, urinary retention, or other unexpected complications.  Discussed that there are potential risks associated with use of mesh of about 3 to 5%, including mesh infection, compared to a 30 to 40% risk of hernia recurrence if not using a mesh with repair. Full recovery time likely 4 weeks until resumption of normal activities. He was given a book with pictures and we reviewed the anatomy of the inguinal region and surgical technique of repair of inguinal hernias. Patient understands and wishes to proceed. We will ask for preoperative clearance from primary care.     Patient opts for conservative management and will follow up with us should the hernia enlarge or  become symptomatic or require manual reduction.     I have discussed with the patient the risks and benefits of colonoscopy. The risks include but are not limited to: infection, bleeding, bowel perforation and risk of injury to surrounding tissue.  Patient understands and accepts these risks and benefits and is willing to undergo the procedure next year.    On 9/18/2018, IZoila scribed the services personally performed by Rinku Jamison MD.   The documentation recorded by the scribe accurately and completely reflects the service(s) I personally performed and the decisions made by me.   Rinku Jamison MD

## 2021-01-21 NOTE — Clinical Note
Labs: significant for glucose 120, Vit D 28.6     EKG:Normal sinus rhythm with sinus arrhythmia   Left axis deviation      Nutritional evaluation: 4/6; start wt: 332    Psychiatric evaluation:needs to be preformed/obtained     Support Group: needs to attend     Additional evaluations:  CXR: WNL   UGI: Normal air contrast evaluation of the upper GI tract  PAP results: need copy of results   PCP clearance: needs to be preformed/obtained

## 2021-01-21 NOTE — PROGRESS NOTES
Tila Willson is a 38 y.o. female  Chief Complaint   Patient presents with    Weight Management     midtrial     Pt ID confirmed    Weight Loss Metrics 1/21/2021 9/24/2020 9/24/2020 10/15/2019 6/10/2019 5/8/2019 4/22/2019   Pre op / Initial Wt - 332 - - - - -   Today's Wt 332 lb - 332 lb 326 lb 321 lb 325 lb 334 lb   BMI 58.81 kg/m2 - 58.81 kg/m2 57.75 kg/m2 56.86 kg/m2 57.57 kg/m2 59.17 kg/m2   Ideal Body Wt - 128 - - - - -   Excess Body Wt - 204 - - - - -   Goal Wt - 169 - - - - -   Wt loss to date - 0 - - - - -   % Wt Loss - 0 - - - - -   80% EBW - 163.2 - - - - -       Body mass index is 58.81 kg/m².

## 2021-02-17 ENCOUNTER — HOSPITAL ENCOUNTER (OUTPATIENT)
Dept: BARIATRICS/WEIGHT MGMT | Age: 36
Discharge: HOME | End: 2021-02-17

## 2021-02-17 NOTE — PROGRESS NOTES
[facility]  [facility], [address]    Patient's Name: Mary Quinn   Age: 43 y.o. YOB: 1985   Sex: female    Date:   2/17/2021    Session: 5 of  6  Surgeon:  Eloise Mendoza    Height: 63\" Weight:    332     Lb   BMI: 58.8     Previous Month's Weight: 335  Pounds Lost since last month: 3                 Pounds Gained since last month: 0    Starting Weight: 332     Overall Pounds Lost: 0   Overall Pounds Gained: 0      Do you smoke? Patient does not  smoke    Alcohol intake: Patient drinks rarely  Number of drinks at a time:  1-2  Number of times a week: only socially    Class Guidelines    Guidelines are reviewed with patient at the start of every class. 1. Patient understands that weight loss trial classes must be consecutive. Patient understands if they miss a class, it is their responsibility to contact me to reschedule class. I will reach out to patient after their first no show. 2.  Patient understands the expectations that weight maintenance/weight loss is expected during the classes. Failure to demonstrate changes may result in one extra month of weight loss trial, followed by going back to see the surgeon. 3. Patient is also instructed to be doing their labs, blood work, psych visit, support group and any other test that the surgeon has used while they are working on their weight loss trial.    Changes Made: Using a tracking water bottle; stopped eating [address] Instruction    During today's class we continued to focus on the key diet principles. Patient was instructed to follow a low carbohydrate diet, focusing on meat and vegetables. Patient was instructed to stop liquid calories and aim for 64 ounces of water per day.  We focused on focusing in on bigger problem areas to start making changes to, such as reducing fast food intake, reducing carbonated beverages/soda intake, decreasing carbohydrates intake daily, etc. We reviewed protein shakes and high protein yogurts to chose, as well. Patient was educated on low sugar swaps for beverages, as well as low carb swaps for every day high carb meals. Patient's diet habits include: Eating 3 meals daily, eggs w/ cheese and deli meat, or salami w/ cheese, 2 good yogurt, or chicken or ground beef w/ starchy veggies. Carb sources come from noodle, potatoes, chocolate, and dairy. Snacking 2-3x/day on 2 good yogurt, salami, cheese, and chocolate. Drinking 50 oz of water, 12 oz of protein shake, and 8 oz of coffee. Physical Activity/Exercise    Comments:     Currently for exercise, patient is walking 3x/week for 15 mintues. We talked about activities for patient to do, including walking, swimming, or chair exercises, as well as some additional steps to take in the day to get extra movement, such as parking further away, walking dog, taking stairs vs elevator, etc.        Behavior Modification  Comments:   During class, I reviewed a power point with patients called, \"How to Read a Nutrition Label and Understanding Carbohydrates. \" During this power point, the patient was educated on reading nutrition labels and ingredients list and how to find low sugar/low fat products when shopping, as well as recognizing different sources of carbohydrates in foods. The patient was encouraged to try low carb food swaps that were discussed in the power point. We explored various ways of preparing and cooking low carb meals to help reach the goal of decreasing daily carbohydrate consumption. Patient's S.M.A.R.T. Goals are:  1. Be more consistent w/ fluid intake as it has been a struggle. 2.  Find a consistent workout routine. 3. Be more mindful of going long periods without walking around (working from home). Getting up every hour for a few minute and be active. Patient has attended a support group.        Charanjit Palma, BONIFACIO  2/17/2021

## 2021-03-17 ENCOUNTER — DOCUMENTATION ONLY (OUTPATIENT)
Dept: BARIATRICS/WEIGHT MGMT | Age: 36
End: 2021-03-17

## 2021-03-17 ENCOUNTER — HOSPITAL ENCOUNTER (OUTPATIENT)
Dept: BARIATRICS/WEIGHT MGMT | Age: 36
Discharge: HOME | End: 2021-03-17

## 2021-03-17 NOTE — PROGRESS NOTES
Nutrition Evaluation    Patient's Name: Ralph Cárdenas   Age: 43 y.o. YOB: 1985   Sex: female    Height: 63\" Weight: 330.4 lbs home scale verified  BMI:  58.5  Starting Weight:  332 lbs        Smoking Status:  Patient does not smoke  Alcohol Intake:  Rare  Number of Drinks at a Time: 1-2  Number of Times a Week: socially, not weekly    Changes made during classes include:  Low carb meals and alternatives  No more creamer in coffee  Limiting caffeine intake during the week  More water  No more soda  Eating out less  Started tracking food and water intake        Two things that patient learned during this weight loss trial:  Healthier low carb alternatives  Importance of tracking food and water intake to meet nutrition goals    Summary:  I feel that Ralph Cárdenas has demonstrated appropriate diet changes and is ready to move forward with surgery. Patient has been briefed on the importance of the protein drinks, vitamins, and the transition of the diet stages. Patient understands that the long-term diet will focus on protein and vegetables. Patient understand the effects of carbohydrates after surgery and what reactive hypoglycemia is. Patient is aware that they will be attending pre-op class 2 weeks before surgery and will get more detailed information on the post-op diet guidelines. Patient will see me again at 6 weeks post-op. At this 6 week visit, RD will assess how patient is tolerating soft protein and advance to vegetables, if tolerating soft protein without difficulty. Patient will also see RD again at 9 months post-op. This visit will assess patient's compliance with current protocol, including diet, vitamins, protein shakes, and exercise. Post-op diet guidelines will be reinforced. RD is available for questions and to meet with patient outside of the 6 week and 9 month post-op visit. We spent a lot of time talking about the vitamins.   Patient understands the importance of being compliant with the diet protocol and the complications and risks that can occur if they are non-compliant with the nutritional protocol. Patient has attended at least one support group. Patient has completed the WLT Graduation Quiz to assess knowledge of post-op diet.        Candidate for surgery: Yes      Procedure:  Gastric Bypass     James Mar, BONIFACIO  3/17/2021

## 2021-03-17 NOTE — PROGRESS NOTES
[employer] [facility]  [facility], [address]    Patient's Name: Haleigh Gamez   Age: 43 y.o. YOB: 1985   Sex: female    Date:   3/17/2021    Session: 6 of  6  Surgeon:  Sis Bowden    Height: 63\" Weight:    330.4 Lbs home scale verified  BMI: 58.5     Previous Month's Weight: 332  Pounds Lost since last month: 2                 Pounds Gained since last month: 0    Starting Weight: 332     Overall Pounds Lost: 2   Overall Pounds Gained: 0      Do you smoke? Patient does not smoke    Alcohol intake: rare  Number of drinks at a time:  1-2  Number of times a week: only socially, not weekly    Class Guidelines    Guidelines are reviewed with patient at the start of every class. 1. Patient understands that weight loss trial classes must be consecutive. Patient understands if they miss a class, it is their responsibility to contact me to reschedule class. I will reach out to patient after their first no show. 2.  Patient understands the expectations that weight maintenance/weight loss is expected during the classes. Failure to demonstrate changes may result in one extra month of weight loss trial, followed by going back to see the surgeon. 3. Patient is also instructed to be doing their labs, blood work, psych visit, support group and any other test that the surgeon has used while they are working on their weight loss trial.    Changes Made: Trading food intake; lots of lo wcarb subs; no soda; rarely going out to eat; more water      Dietary Instruction    During today's class, we continued to focus on the key diet principles. Patient was instructed to follow a low carbohydrate diet, focusing on meat and vegetables. Patient was instructed to stop liquid calories and aim for 64 ounces of water per day.  We focused on focusing in on bigger problem areas to start making changes to, such as reducing fast food intake, reducing carbonated beverages/soda intake, decreasing carbohydrates intake daily, etc. We reviewed protein shakes and high protein yogurts to chose, as well. Patient was educated on good breakfast ideas and high protein snacks. I also reviewed with patient the vitamins that they will need to take post op. Patient will hear this information again at pre op class prior to surgery, but I felt it was important to prepare them now. Patient will be taking 2 multivitamin complete per day, 100 mg of Vitamin B1, 5000 IU of Vitamin D3, 1000 mcg Vitamin B12, 1500 mg of calcium citrate. Patient's diet habits include: Eating 3 meals daily, consisiting of 2 eggs w/ yogurt for breakfast; yogurt or low carb tortilla w/ ham and cheese; dinner Is chicken breast w/ salad and cucumber sticks. Snacking on yogurt, chocolate covered almonds, and popcorn ([employer] chick brand). Carb sources come from: yogurt, low carb tortilla, chocolate almonds and popcorn. Struggling to let go of drinking while she eats and keeping them separate. Drinking 50-64 oz of water daily. Has cut back on milk and only drinking coffee 1-2x/week. Physical Activity/Exercise    Comments:     Currently for exercise, patient is walking 3x/week for 15 minutes at a time. We talked about activities for patient to do, including walking, swimming, or chair exercises, as well as some additional steps to take in the day to get extra movement, such as parking further away, walking dog, taking stairs vs elevator, etc. Patient was encouraged to start up an exercise routine and build on it. Behavior Modification  Comments:   Emphasized the importance of eating slowly, not eating and drinking meals at the same time. Discussed Key Behavior principles to start implementing to be successful following surgery, such as, importance of 3 meals daily, keeping a food journal, avoid distractions during meal times, and chewing food thoroughly, as a few examples. Patient's S.M.A.R.T.  Goals are:  1. Continue to track and monitor food intake. She has been finding this extremely helpful with portion sizes  2. Walking more! 3. Continue monitoring everything she is eating    Patient has attended a support group.        Elias Pickering, BONIFACIO  3/17/2021

## 2021-03-23 ENCOUNTER — TELEPHONE (OUTPATIENT)
Dept: SURGERY | Age: 36
End: 2021-03-23

## 2021-04-15 ENCOUNTER — OFFICE VISIT (OUTPATIENT)
Dept: SURGERY | Age: 36
End: 2021-04-15
Payer: COMMERCIAL

## 2021-04-15 VITALS
WEIGHT: 293 LBS | TEMPERATURE: 97.5 F | OXYGEN SATURATION: 97 % | DIASTOLIC BLOOD PRESSURE: 90 MMHG | HEART RATE: 81 BPM | BODY MASS INDEX: 51.91 KG/M2 | SYSTOLIC BLOOD PRESSURE: 148 MMHG | HEIGHT: 63 IN | RESPIRATION RATE: 18 BRPM

## 2021-04-15 DIAGNOSIS — E66.01 OBESITY, MORBID (HCC): Primary | ICD-10-CM

## 2021-04-15 DIAGNOSIS — I10 ESSENTIAL HYPERTENSION: ICD-10-CM

## 2021-04-15 PROCEDURE — 99213 OFFICE O/P EST LOW 20 MIN: CPT | Performed by: NURSE PRACTITIONER

## 2021-04-15 RX ORDER — FLUOXETINE 10 MG/1
CAPSULE ORAL DAILY
COMMUNITY
End: 2021-11-04

## 2021-04-15 RX ORDER — ARIPIPRAZOLE 10 MG/1
10 TABLET ORAL DAILY
COMMUNITY
End: 2021-11-04

## 2021-04-15 NOTE — PROGRESS NOTES
Bariatric Preoperative Progress Note      Subjective:     Brent Feng is a 43 y.o. female who presents today for followup of their candidacy for bariatric surgery. Since last seen, Brent Feng has been working through bariatric program towards [facility] with Dr. Pavan Mcgraw. Past Medical History:   Diagnosis Date    Anxiety     Asthma     Depression     Hypertension     Musculoskeletal disorder     ruputre disk       Past Surgical History:   Procedure Laterality Date    HX DILATION AND EVACUATION  03/22/2019    HX OTHER SURGICAL      Bladder surger 1990    HX WISDOM TEETH EXTRACTION         Current Outpatient Medications   Medication Sig Dispense Refill    FLUoxetine (PROzac) 10 mg capsule Take  by mouth daily.  ARIPiprazole (Abilify) 10 mg tablet Take 10 mg by mouth daily.  gabapentin (NEURONTIN) 300 mg capsule Take 1 PO TID      lisinopriL (PRINIVIL, ZESTRIL) 20 mg tablet lisinopril 20 mg tablet   TK 1 T PO QD      lisinopril (PRINIVIL, ZESTRIL) 20 mg tablet TAKE 1 TABLET BY MOUTH EVERY DAY 90 Tab 0    montelukast (SINGULAIR) 10 mg tablet TAKE 1 TABLET BY MOUTH DAILY 30 Tab 4    chlorthalidone (HYGROTEN) 25 mg tablet Take 1 Tab by mouth daily. 90 Tab 2    norethindrone (MICRONOR) 0.35 mg tab TK 1 T PO QD  3    hydrOXYzine HCL (ATARAX) 25 mg tablet       VENTOLIN HFA 90 mcg/actuation inhaler INHALE 1 PUFF BY MOUTH EVERY 4 HOURS AS NEEDED FOR WHEEZING 18 g 2    furosemide (LASIX) 20 mg tablet TAKE 1 TABLET BY MOUTH DAILY AS NEEDED 30 Tab 0    buPROPion XL (WELLBUTRIN XL) 150 mg tablet TAKE 1 TABLET BY MOUTH EVERY MORNING 30 Tab 0    busPIRone (BUSPAR) 10 mg tablet Take 1 Tab by mouth three (3) times daily. 90 Tab 1    PNV IW.31/[id number] fum/folic ac (PRENATAL PO) Take  by mouth.  FOLIC ACID PO Take  by mouth.  fluticasone (VERAMYST) 27.5 mcg/actuation nasal spray 2 Sprays by Nasal route daily.  1 Bottle 2       Allergies   Allergen Reactions    Ativan [Lorazepam] Other (comments)     Headaches     Other Plant, Animal, Environmental Other (comments)       Review of Systems:  Positive in BOLD  CONST: Fever, weight loss, fatigue or chills  GI: Nausea, vomiting, abdominal pain, change in bowel habits, hematochezia, melena, and GERD   INTEG: Dermatitis, abnormal moles  HEENT: Recent changes in vision, vertigo, epistaxis, dysphagia and hoarseness  CV: Chest pain, palpitations, HTN, edema and varicosities  RESP: Cough, shortness of breath, wheezing, hemoptysis, snoring and reactive airway disease,   : Hematuria, dysuria, frequency, urgency, nocturia and stress urinary incontinence   MS: Weakness, joint pain -knees back and arthritis  ENDO: Diabetes, thyroid disease, polyuria, polydipsia, polyphagia, poor wound healing, heat intolerance, cold intolerance  LYMPH/HEME: Anemia, bruising and history of blood transfusions  NEURO: Dizziness, headache, fainting, seizures and stroke  PSYCH: Anxiety and depression      Objective:     Physical Exam:  Visit Vitals  BP (!) 148/90   Pulse 81   Temp 97.5 °F (36.4 °C)   Resp 18   Ht 5' 3\" (1.6 m)   Wt 151.5 kg (334 lb)   SpO2 97%   BMI 59.17 kg/m²       Physical Exam  Vitals signs reviewed. Constitutional:       Appearance: She is obese. HENT:      Head: Normocephalic and atraumatic. Eyes:      Pupils: Pupils are equal, round, and reactive to light. Cardiovascular:      Rate and Rhythm: Normal rate. Pulmonary:      Effort: Pulmonary effort is normal.   Neurological:      Mental Status: She is alert and oriented to person, place, and time.    Psychiatric:         Mood and Affect: Mood and affect normal.         Behavior: Behavior normal.           Labs: significant for glucose 120, Vit D 28.6      EKG:Normal sinus rhythm with sinus arrhythmia   Left axis deviation       Nutritional evaluation: 6/6; start wt: 332     Psychiatric evaluation: approved      Support Group: attended      Additional evaluations:  CXR: WNL   UGI: Normal air contrast evaluation of the upper GI tract  PAP results:  Atypical squamous cells   PCP clearance: needs to be preformed/obtained   Assessment:   Isela Alfaro is a 43 y.o. female who is progressing through the bariatric preoperative evaluation. Plan:   -complete remainder of preop evaluation per above   - patient communicates understanding that the expectation is to lose or maintain weight during WLT. Weight gain may result in delay or cancellation of surgery.   -Follow up once has completed entirety of weight loss workup to determine next steps.         Rehan Thomas, MARU-BC

## 2021-06-17 ENCOUNTER — OFFICE VISIT (OUTPATIENT)
Dept: SURGERY | Age: 36
End: 2021-06-17
Payer: COMMERCIAL

## 2021-06-17 VITALS
RESPIRATION RATE: 20 BRPM | HEIGHT: 63 IN | BODY MASS INDEX: 51.91 KG/M2 | WEIGHT: 293 LBS | TEMPERATURE: 99.3 F | DIASTOLIC BLOOD PRESSURE: 75 MMHG | SYSTOLIC BLOOD PRESSURE: 134 MMHG | HEART RATE: 82 BPM

## 2021-06-17 PROCEDURE — 99214 OFFICE O/P EST MOD 30 MIN: CPT | Performed by: SURGERY

## 2021-06-17 NOTE — PROGRESS NOTES
Young Vera is a 43 y.o. female who presents today with   Chief Complaint   Patient presents with    Morbid Obesity     Pt presents today to discuss surgical options                Body mass index is 57.75 kg/m². 1. Have you been to the ER, urgent care clinic since your last visit? Hospitalized since your last visit? No    2. Have you seen or consulted any other health care providers outside of the [address] [place] [address] since your last visit? Include any pap smears or colon screening.  No

## 2021-06-17 NOTE — PROGRESS NOTES
Bariatric Follow-Up Evaluation    Sabino Almanza is a 43 y.o. white female initially evaluated September 24, 2020 for discussion surgical options available for definitive management of her super obesity. The patient at that time weighed 332 pounds and a 5 foot 3 inch frame with body mass index of 58 with obesity related comorbidities hypertension, reactive airway disease, clinical obstructive sleep apnea and weight related arthropathy. The patient discussing surgical options elected pursue laparoscopic potentially open Nandini-en-Y gastric bypass to achieve definitive durable weight loss on a personal level expected resolution of obesity related comorbidities. The patient however subsequent that time is considering sleeve gastrectomy rather than gastric bypass.   Patient presents today after completing all multidisciplinary bariatric surgery programmatic requirements necessary prior to pursuit of surgery for discussion of the diagnostic evaluation and potential surgical scheduling noting No new medical or surgical history  Comorbidities: Hypertension, reactive airway disease, clinical obstructive sleep apnea, plantar fasciitis, weight related arthropathyback  Current weight: 226 BMI: 58  Ideal body weight: 128  Excess body weight: 198  Estimated postsurgical weight loss 158  Goal weight: 168    Weight Loss Metrics 6/17/2021 6/17/2021 4/15/2021 1/21/2021 9/24/2020 9/24/2020 10/15/2019   Pre op / Initial Wt 326 - - - 332 - -   Today's Wt - 326 lb 334 lb 332 lb - 332 lb 326 lb   BMI - 57.75 kg/m2 59.17 kg/m2 58.81 kg/m2 - 58.81 kg/m2 57.75 kg/m2   Ideal Body Wt 128 - - - 128 - -   Excess Body Wt 198 - - - 204 - -   Goal Wt 168 - - - 169 - -   Wt loss to date 0 - - - 0 - -   % Wt Loss 0 - - - 0 - -   80% .4 - - - 163.2 - -       Allergies   Allergen Reactions    Ativan [Lorazepam] Other (comments)     Headaches     Other Plant, Animal, Environmental Other (comments)       Current Outpatient Medications on File Prior to Visit   Medication Sig Dispense Refill    FLUoxetine (PROzac) 10 mg capsule Take  by mouth daily.  ARIPiprazole (Abilify) 10 mg tablet Take 10 mg by mouth daily.  gabapentin (NEURONTIN) 300 mg capsule Take 1 PO TID      VENTOLIN HFA 90 mcg/actuation inhaler INHALE 1 PUFF BY MOUTH EVERY 4 HOURS AS NEEDED FOR WHEEZING 18 g 2    lisinopril (PRINIVIL, ZESTRIL) 20 mg tablet TAKE 1 TABLET BY MOUTH EVERY DAY 90 Tab 0    montelukast (SINGULAIR) 10 mg tablet TAKE 1 TABLET BY MOUTH DAILY 30 Tab 4    chlorthalidone (HYGROTEN) 25 mg tablet Take 1 Tab by mouth daily. 90 Tab 2     No current facility-administered medications on file prior to visit. Past Medical History:   Diagnosis Date    Anxiety     Asthma     Depression     Hypertension     Musculoskeletal disorder     ruputre disk       Past Surgical History:   Procedure Laterality Date    HX DILATION AND EVACUATION  03/22/2019    HX OTHER SURGICAL      Bladder surger 1990    HX WISDOM TEETH EXTRACTION         Social History     Tobacco Use    Smoking status: Never Smoker    Smokeless tobacco: Never Used   Substance Use Topics    Alcohol use: Yes     Alcohol/week: 1.0 standard drinks     Types: 1 Cans of beer per week     Comment: socially    Drug use: No       Family History   Problem Relation Age of Onset    Hypertension Mother     Cancer Mother     Hypertension Father     Other Brother         Born with heart murmur    Cancer Maternal Grandmother         Breast    Diabetes Maternal Grandfather        ROS:  General: Negative for fevers, chills, night sweats, fatigue, weight loss, or weight gain. GI: Negative for abdominal pain, change in bowel habits, hematochezia, melena, or GERD. Integumentary: Negative for dermatitis or abnormal moles.     HEENT: Negative for visual changes, vertigo, epistaxis, dysphagia, or hoarseness    Cardiac: Negative for chest pain, palpitations, hypertension, edema, or varicosities    Resp: Negative for cough, shortness of breath, wheezing, hemoptysis, snoring, or reactive airway disease    : Negative for hematuria, dysuria, frequency, urgency, nocturia, or stress urinary incontinence    MSK:  Negative for weakness, joint pain, or arthritis    Endocrine: Negative for diabetes, thyroid disease, polyuria, polydipsia, polyphagia, poor wound, heat intolerance, or cold intolerance. Lymph/Heme: Negative for anemia, bruising, or history of blood transfusions. Neuro:  Negative for dizziness, headache, fainting, seizures, and stroke. Psychiatry:  Negative for anxiety or depression    Physical Exam    Visit Vitals  /75 (BP 1 Location: Left upper arm, BP Patient Position: At rest, BP Cuff Size: Adult)   Pulse 82   Temp 99.3 °F (37.4 °C) (Oral)   Resp 20   Ht 5' 3\" (1.6 m)   Wt 147.9 kg (326 lb)   BMI 57.75 kg/m²       Nursing note reviewed. General: 43 y.o. female is in no acute distress. Head: Normocephalic, atraumatic  Mouth: Clear, no overt lesions, oral mucosa is pink and moist.  Neck: Supple, no masses, no adenopathy or carotid bruits, trachea midline  Resp: Clear to auscultation bilaterally, no wheezing, rhonchi, or rales, excursions normal and symmetrical.  Cardio: Regular rate and rhythm, no murmurs, clicks, gallops, or rubs. No edema or varicosities. Abdomen: Obese, soft, nontender, nondistended, normoactive bowel sounds, no hernias, no hepatosplenomegaly,   Extremities: Warm, well perfused, no tenderness or swelling, normal gait/station   Neuro: Sensation and strength grossly intact and symmetrical.  Psych: Alert and oriented to person, place, and time. October 12, 2020 CBC, CMP, cholesterol panel, TSH, urinalysis, vitamin B1, vitamin B12, folate, iron, vitamin D, H. pylori: Vitamin D 20.6, glucose 120 similar laboratory profile within normal limits.   The patient was begun on supplement vitamin D attend receipt of her laboratory profile  March 22, 2021 Pap smear: Atypical squamous cells of unknown etiologypatient advised to follow-up with her gynecologist  October 12, 2020 chest x-ray: No active pulmonary disease  April 27, 2021 echo: Ejection fraction 60%/normal October 12, 2020 upper GI: Normal March 17, 2021 bariatric nutrition/Vanessasufov: Concurring with pursuit of surgery  March 18, 2021 psychology/Antonio: Concurrent pursuit of surgery  March 17, 2021 nutrition/Yusufov: Concurrent pursuit of surgery  October 12, 2020 EKG: Normal sinus rhythm with a rate of 64, sinus arrhythmia, left axis deviation    Impression     Super obesity with body mass index 58 with obesity related comorbidities of hypertension, reactive airway disease, clinical obstructive sleep apnea, plantar fasciitis, weight related arthropathyback pain      Plan    Will have the patient reassessed by bariatric nutrition was specific discussion in regard to gastric bypass.   After the patient has made decision as to which surgical procedure she desires we will see her back in follow-up for preoperative evaluation prior to scheduling her for surgery

## 2021-06-18 ENCOUNTER — DOCUMENTATION ONLY (OUTPATIENT)
Dept: BARIATRICS/WEIGHT MGMT | Age: 36
End: 2021-06-18

## 2021-06-18 NOTE — PROGRESS NOTES
6/18/2021: RD called patient to discuss the 2 types of weight loss surgery. LVM and my phone number was provided.     Kristie Aceves RD

## 2021-06-22 ENCOUNTER — DOCUMENTATION ONLY (OUTPATIENT)
Dept: BARIATRICS/WEIGHT MGMT | Age: 36
End: 2021-06-22

## 2021-06-22 NOTE — PROGRESS NOTES
6/21/2021: Patient called RD to discuss her hesitations in getting gastric bypass vs gastric sleeve surgery. Patient noted that her  and her like to travel often and she is just concerned that there will not be a lot of food choices offered that would fit into the post-op diet recommendations when overseas or on vacation. Patient expressed that she felt as if with the sleeve she would be more able to practice self control, keep herself in check, and not have the high risk of dumping syndrome if accidentally eating the wrong foods during vacations, when compared with the LGBP risks. She worries that during travel, it will be harder to follow the diet d/t inconsistency in preparation of foods at restaurants, cruises, and the like. Patient became tearful on the phone and felt like she really did not have much support in her surgery decision. I urged the patient to do some more research, join support groups, and speak with other patients that have had these surgeries to help with her decision. Patient did not have any further questions at the end of our call, however, notified me that she would contact me if anything came up that she needed clarification on.      Rubi Ruiz RD

## 2021-06-24 ENCOUNTER — OFFICE VISIT (OUTPATIENT)
Dept: SURGERY | Age: 36
End: 2021-06-24
Payer: COMMERCIAL

## 2021-06-24 VITALS
RESPIRATION RATE: 20 BRPM | DIASTOLIC BLOOD PRESSURE: 83 MMHG | HEART RATE: 68 BPM | BODY MASS INDEX: 51.91 KG/M2 | SYSTOLIC BLOOD PRESSURE: 132 MMHG | TEMPERATURE: 98.6 F | HEIGHT: 63 IN | WEIGHT: 293 LBS

## 2021-06-24 PROCEDURE — 99213 OFFICE O/P EST LOW 20 MIN: CPT | Performed by: SURGERY

## 2021-06-24 NOTE — PROGRESS NOTES
Bariatric Follow-Up Evaluation    Daniela Abrams is a 43 y.o. white female having previously been evaluated in this office for consideration of surgical options of L4 definitive treatment of her super obesity. The patient in the past has vacillated between sleeve gastrectomy and gastric bypass as to her procedure of choice. She presents today noting that she wishes to pursue laparoscopic initially open Nandini-en-Y gastric bypass to achieve definitive durable weight loss on a personal level expected resolution of obesity related comorbidities. Patient is completed all multidisciplinary programmatic bariatric surgical requirements necessary prior to pursuit of surgery and presents without new medical or surgical history for surgical scheduling    Weight Loss Metrics 6/24/2021 6/24/2021 6/17/2021 6/17/2021 4/15/2021 1/21/2021 9/24/2020   Pre op / Initial Wt 330 - 326 - - - 332   Today's Wt - 330 lb - 326 lb 334 lb 332 lb -   BMI - 58.46 kg/m2 - 57.75 kg/m2 59.17 kg/m2 58.81 kg/m2 -   Ideal Body Wt 131 - 128 - - - 128   Excess Body Wt 199 - 198 - - - 204   Goal Wt 171 - 168 - - - 169   Wt loss to date 0 - 0 - - - 0   % Wt Loss 0 - 0 - - - 0   80% .2 - 158.4 - - - 163.2       Allergies   Allergen Reactions    Ativan [Lorazepam] Other (comments)     Headaches     Other Plant, Animal, Environmental Other (comments)       Current Outpatient Medications on File Prior to Visit   Medication Sig Dispense Refill    FLUoxetine (PROzac) 10 mg capsule Take  by mouth daily.  ARIPiprazole (Abilify) 10 mg tablet Take 10 mg by mouth daily.       gabapentin (NEURONTIN) 300 mg capsule Take 1 PO TID      VENTOLIN HFA 90 mcg/actuation inhaler INHALE 1 PUFF BY MOUTH EVERY 4 HOURS AS NEEDED FOR WHEEZING 18 g 2    lisinopril (PRINIVIL, ZESTRIL) 20 mg tablet TAKE 1 TABLET BY MOUTH EVERY DAY 90 Tab 0    montelukast (SINGULAIR) 10 mg tablet TAKE 1 TABLET BY MOUTH DAILY 30 Tab 4    chlorthalidone (HYGROTEN) 25 mg tablet Take 1 Tab by mouth daily. 90 Tab 2     No current facility-administered medications on file prior to visit. Past Medical History:   Diagnosis Date    Anxiety     Asthma     Depression     Hypertension     Musculoskeletal disorder     ruputre disk       Past Surgical History:   Procedure Laterality Date    HX DILATION AND EVACUATION  03/22/2019    HX OTHER SURGICAL      Bladder surger 1990    HX WISDOM TEETH EXTRACTION         Social History     Tobacco Use    Smoking status: Never Smoker    Smokeless tobacco: Never Used   Substance Use Topics    Alcohol use: Yes     Alcohol/week: 1.0 standard drinks     Types: 1 Cans of beer per week     Comment: socially    Drug use: No       Family History   Problem Relation Age of Onset    Hypertension Mother     Cancer Mother     Hypertension Father     Other Brother         Born with heart murmur    Cancer Maternal Grandmother         Breast    Diabetes Maternal Grandfather        ROS:  General: Negative for fevers, chills, night sweats, fatigue, weight loss, or weight gain. GI: Negative for abdominal pain, change in bowel habits, hematochezia, melena, or GERD. Integumentary: Negative for dermatitis or abnormal moles. HEENT: Negative for visual changes, vertigo, epistaxis, dysphagia, or hoarseness    Cardiac: Negative for chest pain, palpitations, hypertension, edema, or varicosities    Resp: Negative for cough, shortness of breath, wheezing, hemoptysis, snoring, or reactive airway disease    : Negative for hematuria, dysuria, frequency, urgency, nocturia, or stress urinary incontinence    MSK:  Negative for weakness, joint pain, or arthritis    Endocrine: Negative for diabetes, thyroid disease, polyuria, polydipsia, polyphagia, poor wound, heat intolerance, or cold intolerance. Lymph/Heme: Negative for anemia, bruising, or history of blood transfusions. Neuro:  Negative for dizziness, headache, fainting, seizures, and stroke.     Psychiatry: Negative for anxiety or depression    Physical Exam    Visit Vitals  /83 (BP 1 Location: Left upper arm, BP Patient Position: At rest, BP Cuff Size: Adult)   Pulse 68   Temp 98.6 °F (37 °C) (Oral)   Resp 20   Ht 5' 3\" (1.6 m)   Wt 149.7 kg (330 lb)   BMI 58.46 kg/m²       Nursing note reviewed. General: 43 y.o. female is in no acute distress. Head: Normocephalic, atraumatic  Resp: Clear to auscultation bilaterally, no wheezing, rhonchi, or rales, excursions normal and symmetrical.  Cardio: Regular rate and rhythm, no murmurs, clicks, gallops, or rubs. No edema or varicosities. Abdomen: Obese, soft, nontender, nondistended, normoactive bowel sounds, no hernias, no hepatosplenomegaly, al.  Psych: Alert and oriented to person, place, and time. Impression  Super obesity with body mass index of 59 with obesity related comorbidities of hypertension, clinical obstructive sleep apnea, plantar fasciitis and weight related arthropathy      Plan    Risk benefits of operating versus options alternatives complications up to including death were again discussed with the patient. The patient has elected to pursue laparoscopic potentially open Nandini-en-Y gastric bypass to achieve definitive durable weight loss on a personal level expected resolution of obesity related comorbidities.   We will proceed with surgical scheduling

## 2021-06-24 NOTE — PROGRESS NOTES
Carole Solano is a 43 y.o. female who presents today with   Chief Complaint   Patient presents with    Morbid Obesity     Pt presents today to discuss surgical options. Body mass index is 58.46 kg/m². 1. Have you been to the ER, urgent care clinic since your last visit? Hospitalized since your last visit? No    2. Have you seen or consulted any other health care providers outside of the [address] [place] [address] since your last visit? Include any pap smears or colon screening.  No

## 2021-07-05 ENCOUNTER — DOCUMENTATION ONLY (OUTPATIENT)
Dept: BARIATRICS/WEIGHT MGMT | Age: 36
End: 2021-07-05

## 2021-07-06 ENCOUNTER — HOSPITAL ENCOUNTER (OUTPATIENT)
Dept: BARIATRICS/WEIGHT MGMT | Age: 36
Discharge: HOME | End: 2021-07-06

## 2021-07-06 ENCOUNTER — TELEPHONE (OUTPATIENT)
Dept: MEDSURG UNIT | Age: 36
End: 2021-07-06

## 2021-07-06 NOTE — PROGRESS NOTES
CLINICAL NUTRITION PRE-OPERATIVE EDUCATION    Patient's Name: Bethanie Lennox   Age: 43 y.o. YOB: 1985   Sex: female    Education & Materials Provided:   - Soft Protein Diet Shopping List  -  Supplemental Resource Guide: MVI, B12, Calcium Citrate, Vitamin D, Vitamin B1,   and iron recommendations  - Protein Supplement Information  - Fluid Requirements/ No Straws  - No Caffeine or Carbonation   - No alcohol              - No Snacks or No Concentrated Sweets     - Exercising   - Support System at [facility] of Support Group meetings. Support System after surgery includes: x     - Key Diet Principles            - Addressed Current Habits/Changes to Make   - Patient has been educated on the liquid diet to begin 1 week prior to surgery. Patient understands the transition of the diet. Attendance of support group: Yes    Summary:  Patient has completed the required amount of visits with the Registered Dietitian. During these nutrition visits, we focused on dietary changes, behavior changes, and the importance of establishing an exercise routine. The diet protocol that patient was prescribed emphasized on low carbohydrates (less than 100 grams per day prior to surgery and 60-80 grams of protein per day). At today's session, patient was educated on the post-op diet protocol. Patient understands the importance of keeping total fat and sugar less than 3 grams per serving. Patient is aware of the transition of the diet stages and is aware that they will be on clear liquids for 7days, followed by soft protein for 5 weeks. Patient understands the body needs ~ 60-70 grams of protein per day. During the liquid phase, patient will need 60 grams of protein from shakes. Once eating soft protein, patient will only need 1 shake per day. Patient is aware of the importance of the vitamins and protein drinks. We spent a lot of time talking about the vitamins.   Patient understands the importance of being compliant with the diet protocol and the complications and risks that can occur if they are non-compliant with the nutritional protocol and non-compliant with the vitamins. Patient has also been educated on the pre-op liquid diet for 1 week. Patient understands that failure to comply in pre-op liquid diet could result in surgery being canceled. Patient's 6 week post-op nutrition appointment has been scheduled. At this 6 week visit, RD will assess how patient is tolerating soft protein and advance to vegetables, if tolerating soft protein without difficulty. Patient will also see RD again at 9 months post-op. This visit will assess patient's compliance with current protocol, including diet, vitamins, protein shakes, and exercise. Post-op diet guidelines will be reinforced. RD is available for questions and to meet with patient outside of the 6 week and 9 month post-op visit. Ok to proceed.      Candidate for surgery: Yes  Re-evaluation Date:     Hellen Anderson 87 RD  7/5/2021

## 2021-07-07 RX ORDER — ENOXAPARIN SODIUM 100 MG/ML
40 INJECTION SUBCUTANEOUS DAILY
Qty: 7 SYRINGE | Refills: 0 | Status: SHIPPED | OUTPATIENT
Start: 2021-07-19 | End: 2021-07-26

## 2021-07-07 NOTE — TELEPHONE ENCOUNTER
Gastric Bypass Instruct patient to read and understand how their surgery works. The laparoscopic Nandini-en-Y Gastric Bypass -- often called gastric  bypass -- is considered the gold standard of weight loss surgery. The procedure: The gastric bypass is one of the most frequently performed weight  loss procedures in the [place]. In this procedure, stapling  creates a small (15 to 20 milliliters) stomach pouch. The remainder  of the stomach is not removed but is completely stapled shut and divided from the stomach  pouch. The outlet from this newly formed pouch empties directly into the lower portion of the  small intestine, thus bypassing calorie absorption. This is done by dividing the small intestine just  beyond the duodenum for the purpose of bringing it up and constructing a connection with the  newly formed stomach pouch. The other end is connected into the side of the Nandini limb of the  intestine creating the Y shape that gives the technique its name. The length of either segment of  the intestine can be increased to produce lower or higher levels of malabsorption. Most importantly, the rerouting of the food stream produces changes in gut hormones that  promote feeling of fullness, suppress hunger, and reverse one of the primary mechanisms by which  obesity induces Type 2 diabetes. Advantages:   The average excess weight loss after the gastric bypass procedure is generally higher in a  compliant patient than with purely restrictive procedures.  One year after surgery, weight loss can average 60 to 80 percent of excess body weight.  Studies show that after 10 to 14 years, 50 to 60 percent of excess body weight loss has been  maintained by some patients.  A 2000 study of 500 patients showed that 96 percent of associated health conditions (back  pain, sleep apnea, high blood pressure, diabetes and depression) were improved or resolved.   Disadvantages:   Because the duodenum is bypassed, poor absorption of iron and calcium can result in the  lowering of total body iron and a predisposition to iron deficiency anemia.  A chronic anemia caused by vitamin B-12 deficiency may occur. This problem usually can be  prevented with vitamin B-12 pills under the tongue or injections.  In some cases, the effectiveness of the procedure may be reduced if the stomach pouch is  stretched and/or if it is initially left larger than 15 to 30 cc.  The bypassed portion of the stomach, duodenum and segments of the small intestine cannot  be easily visualized using X-ray or endoscopy if there are any problems, such as ulcers,  bleeding or malignancy. Sleeve Gastrectomy  The laparoscopic sleeve gastrectomy -- often called the  sleeve -- is performed by removing approximately 80 percent  of the stomach. The remaining stomach is a tubular pouch that  resembles a banana. The procedure:  During the sleeve gastrectomy procedure, a thin, vertical sleeve  of stomach is created by using a stapling device. The sleeve is about the size of a banana. The rest  of the stomach is removed. By creating a smaller stomach pouch, a sleeve gastrectomy limits the  amount of food that can be eaten at one time so you feel full sooner and stay full longer. As you  eat less food, your body will stop storing excess calories and start using its fat supply for energy. The greater impact, however, seems to be the effect the surgery has on gut hormones that impact  a number of factors including hunger, feeling of fullness, and blood sugar control. Advantages:   Eliminates the portion of the stomach that produces the hormones that stimulate hunger.  Minimizes the chance of an ulcer occurring.  Is an appealing option for people who are concerned about the complications of intestinal  bypass procedures or who have anemia, Crohns disease or various other conditions that make  intestinal bypass procedures too risky.   -- 13 --  PATIENT GUIDE TO Evangelista Veliz  Disadvantages:   Potential for inadequate weight loss or weight regain. While this is true for all procedures, it is  more possible with procedures that do not have an intestinal bypass.  Higher BMI patients may need to have a second-stage procedure later to help lose additional  weight. Remember, two stages may ultimately be safer and more effective than one operation  for higher BMI patients.  This procedure does involve stomach stapling and therefore, leaks and other complications  related to stapling may occur.  This procedure is not reversible. Pre op Appoitments  PE LOCATION PROVIDER  2 Weeks  6-Week Nutrition  3 Months*  6 Months*  1 Year*  Patient Acknowledgement of Risks, Benefits,  and Alternatives to Bariatric Surgical Procedures  Patient Name:_________________________________________________________________  :_ _______________________________________________________________________  I am requesting bariatric surgery be performed on me. I believe I may benefit from bariatric  surgery. This form is designed to ensure that I understand the risks, benefits, and alternatives to  having bariatric surgery. Bariatric surgery, or surgery for morbid obesity, is major surgery. The  options available include restrictive procedures, or those that limit digestive capacity. Examples  include vertical sleeve gastrectomy, or the adjustable gastric band (Lap-Band¢ç/Realize). Malabsorptive procedures, such as distal gastric bypass produce weight loss by decreasing nutrient  absorption. Biliopancreatic diversion with duodenal switch (BPD/DS) and Nandini-en-Y gastric  bypass combine these two processes to varying degrees. While most procedures can be performed  laparoscopically (through multiple small incisions), there is a possibility that an open technique  may be required (through a large incision).  There are alternatives to surgery including medications,  diet and exercise, and behavior modification. I understand that obesity is a chronic disease with no  known cure. I am choosing bariatric surgery as treatment for this disease. Patient initials: ______________  I am informed of the potential benefits from bariatric surgery which may include improvements  in associated comorbidities (ie; diabetes, obstructive sleep apnea, GERD, high blood pressure),  potential weight loss of 50-80 percent excess weight, and general improvement in quality of life. The benefits are not guaranteed, and are dependent upon me making the necessary lifestyle  changes for success. I am aware that some patients may not lose as much weight, or still may  require treatment for medical problems after bariatric surgery. Some patients may gain back some  or all of the weight lost after bariatric surgery. Bariatric surgery is a treatment tool, not a cure for  obesity. Compliance with the dietary and lifestyle recommendations is necessary for maintenance  of lost weight in the long term. For example, I have been taught that it is recommended that  all patients maintain a healthy diet consisting of low-carbohydrate, low-sugar foods rich in lean  protein, and non-starchy vegetables. Regular exercise including aerobic activity and weight  training is encouraged, as well as regular attendance at support group meetings. Patient initials: ______________  -- 23 --  PATIENT GUIDE TO Evangelista Veliz  Eliminating habits that could be detrimental to my health such as drinking alcohol or smoking  is required for all patients. I am aware that the risks of smoking and alcohol use after bariatric  surgery include anesthesia complications, stomach ulcers, liver diseases and malnutrition.   In addition, research has shown an increase in sensitivity to alcohol particularly after gastric bypass  procedures resulting in rapid increases in blood alcohol levels and possible addiction. Patient initials: ______________  Because bariatric surgery is considered major surgery, there are many potential complications that  could arise. Some of the problems are related to the bariatric procedure itself, while others are  related to anesthesia and operating on the abdomen. I understand the serious potential complications include: deep venous thrombosis/pulmonary  embolism (blood clots in the legs and or lungs); gastrointestinal leak (leakage of digestive contents  into the abdomen); sepsis (serious infection); injury to adjacent organs such as esophagus, spleen,  pancreas, liver, diaphragm (requiring intervention or surgical removal); excessive bleeding; bowel  obstruction (blockage of the intestines); or organ failure. I am informed that these complications  can be life threatening. The overall mortality rate (risk of death) from bariatric surgery is close to  0.1 percent (1/1,000), but can be as high as 0.5 percent (1/200) for some patients. Patient initials: ______________  I understand that complications requiring re-operation may occur, either immediately after initial  surgery, or later in the recovery process. Patient initials: ______________  Other potential complications which I may have include: wound infections or seromas  (fluid collection under skin); hernias (breakdown of tissue holding in abdominal contents);  gastritis or stomach ulcer (inflammation of the stomach); formation of gallstones; formation  of kidney stones or urinary tract infection; or pneumonia. Device related complications such as  foreign body reaction, band slippage, or erosion may occur with the adjustable gastric band  (Lap-Band¢ç/Realize). Patient initials: ______________  -- 21 --  PATIENT GUIDE TO Evangelista Veliz  I may struggle with food intolerances after bariatric surgery. These may include sugars, fats, and  lactose (milk sugar).  My taste for certain foods may change as well. Dumping Syndrome (reaction  caused by sugar rapidly entering the intestine -- symptoms include: nausea, sweating, weakness,  dizziness, flushing, possible vomiting and/or diarrhea) occurs often after bariatric surgery. Vomiting and changes in bowel habits may occur, and may be the result of eating too fast, taking  too large a bite, inappropriate food choice or not chewing properly. Chronic vomiting may be a  result of stenosis (tightening) in the stomach pouch and intestine, and may require that I have  treatment with endoscopy or surgery. Malabsorption may lead to diarrhea, foul smelling gas and  protein malnutrition. Though rarely, I may require feedings through tubes into the digestive tract  or veins for nourishment. I am aware that in some patients hair loss or thinning may occur in the  rapid weight loss phase. This is usually temporary, but can be permanent in some cases. I have  been taught about the importance of proper hydration after bariatric surgery, and understand that  dehydration is the most common reason for re-admission to the hospital after surgery. Patient initials: ______________  I understand that over time, and especially with forced overeating, the stomach pouch may stretch  (dilate) or the staple lines may break. This can result in weight regain, ulcer formation or both. Patient initials: ______________  As a female undergoing bariatric surgery, I acknowledge that I must prevent pregnancies for at  least 12 to 18 months following surgery. Pregnancy during the rapid weight loss phase can be  dangerous and harmful to both the mother and fetus. Patient initials: ______________  Vitamin and mineral deficiencies can occur after bariatric surgery. I am instructed to take vitamin  and mineral supplements daily for life (including multivitamin, iron, B vitamins, calcium and vitamin  D).  Failure to comply with these recommendations could result in my experiencing weakness,  nerve or brain damage, confusion, fatigue, rashes, anemia, hair loss, bone loss, and mood changes. I agree to have lab work at regular intervals to assess for vitamin deficiencies. I understand that it  is imperative that I receive continued follow up care after my bariatric surgery by my surgeon, my  program, or other clinician experienced in bariatric care. Patient initials: ______________  -- 21 --  PATIENT GUIDE TO Evangelista Veliz  If I have an adjustable gastric band (Lap-Band¢ç/Realize), needle adjustments (addition/removal  of saline solution) are required for weight loss and to maintain weight loss. Patient initials: ______________  After I lose weight, the skin of my arms, legs, abdomen, neck, and face may become wrinkled,  droop or sag. Rashes and infections may occur in between my skin folds. Cosmetic surgery may  be indicated in some cases. This is not considered medically necessary in most cases and is rarely  covered by insurance. Patient initials: ______________  I understand that psychological changes may occur with weight loss as a result of bariatric surgery,  which can affect relationships with my loved ones. I agree to re-engage with a mental health  provider as needed. Patient initials: ______________  Some patients elect to have revisional bariatric surgery (correcting anatomic defects from a  previous bariatric operation). I am aware that in these cases, all of the previously addressed risks  apply, however they are three to five times more common. Patient initials: ______________  Follow-up appointments are vital. I agree to the following schedule of appointments after surgery:  two to three weeks, three months, six months, 12 months, and then annually for life. Additional  appointments may be necessary. Gastric Band patient follow-up is determined by my need for  adjustments but is at least once per year after the first year.   Patient initials: ______________  -- 25 --  PATIENT GUIDE  Patient Acknowledgement of Risks, Benefits,  and Alternatives to Bariatric Surgical Procedures  Patient Name:_________________________________________________________________  :_ _______________________________________________________________________  I am requesting bariatric surgery be performed on me. I believe I may benefit from bariatric  surgery. This form is designed to ensure that I understand the risks, benefits, and alternatives to  having bariatric surgery. Bariatric surgery, or surgery for morbid obesity, is major surgery. The  options available include restrictive procedures, or those that limit digestive capacity. Examples  include vertical sleeve gastrectomy, or the adjustable gastric band (Lap-Band¢ç/Realize). Malabsorptive procedures, such as distal gastric bypass produce weight loss by decreasing nutrient  absorption. Biliopancreatic diversion with duodenal switch (BPD/DS) and Nandini-en-Y gastric  bypass combine these two processes to varying degrees. While most procedures can be performed  laparoscopically (through multiple small incisions), there is a possibility that an open technique  may be required (through a large incision). There are alternatives to surgery including medications,  diet and exercise, and behavior modification. I understand that obesity is a chronic disease with no  known cure. I am choosing bariatric surgery as treatment for this disease. Patient initials: ______________  I am informed of the potential benefits from bariatric surgery which may include improvements  in associated comorbidities (ie; diabetes, obstructive sleep apnea, GERD, high blood pressure),  potential weight loss of 50-80 percent excess weight, and general improvement in quality of life. The benefits are not guaranteed, and are dependent upon me making the necessary lifestyle  changes for success.  I am aware that some patients may not lose as much weight, or still may  require treatment for medical problems after bariatric surgery. Some patients may gain back some  or all of the weight lost after bariatric surgery. Bariatric surgery is a treatment tool, not a cure for  obesity. Compliance with the dietary and lifestyle recommendations is necessary for maintenance  of lost weight in the long term. For example, I have been taught that it is recommended that  all patients maintain a healthy diet consisting of low-carbohydrate, low-sugar foods rich in lean  protein, and non-starchy vegetables. Regular exercise including aerobic activity and weight  training is encouraged, as well as regular attendance at support group meetings. Patient initials: ______________  -- 23 --  PATIENT GUIDE TO Evangelista Veliz  Eliminating habits that could be detrimental to my health such as drinking alcohol or smoking  is required for all patients. I am aware that the risks of smoking and alcohol use after bariatric  surgery include anesthesia complications, stomach ulcers, liver diseases and malnutrition. In addition, research has shown an increase in sensitivity to alcohol particularly after gastric bypass  procedures resulting in rapid increases in blood alcohol levels and possible addiction. Patient initials: ______________  Because bariatric surgery is considered major surgery, there are many potential complications that  could arise. Some of the problems are related to the bariatric procedure itself, while others are  related to anesthesia and operating on the abdomen.   I understand the serious potential complications include: deep venous thrombosis/pulmonary  embolism (blood clots in the legs and or lungs); gastrointestinal leak (leakage of digestive contents  into the abdomen); sepsis (serious infection); injury to adjacent organs such as esophagus, spleen,  pancreas, liver, diaphragm (requiring intervention or surgical removal); excessive bleeding; bowel  obstruction (blockage of the intestines); or organ failure. I am informed that these complications  can be life threatening. The overall mortality rate (risk of death) from bariatric surgery is close to  0.1 percent (1/1,000), but can be as high as 0.5 percent (1/200) for some patients. Patient initials: ______________  I understand that complications requiring re-operation may occur, either immediately after initial  surgery, or later in the recovery process. Patient initials: ______________  Other potential complications which I may have include: wound infections or seromas  (fluid collection under skin); hernias (breakdown of tissue holding in abdominal contents);  gastritis or stomach ulcer (inflammation of the stomach); formation of gallstones; formation  of kidney stones or urinary tract infection; or pneumonia. Device related complications such as  foreign body reaction, band slippage, or erosion may occur with the adjustable gastric band  (Lap-Band¢ç/Realize). Patient initials: ______________  -- 21 --  PATIENT GUIDE TO Evangelista Veliz  I may struggle with food intolerances after bariatric surgery. These may include sugars, fats, and  lactose (milk sugar). My taste for certain foods may change as well. Dumping Syndrome (reaction  caused by sugar rapidly entering the intestine -- symptoms include: nausea, sweating, weakness,  dizziness, flushing, possible vomiting and/or diarrhea) occurs often after bariatric surgery. Vomiting and changes in bowel habits may occur, and may be the result of eating too fast, taking  too large a bite, inappropriate food choice or not chewing properly. Chronic vomiting may be a  result of stenosis (tightening) in the stomach pouch and intestine, and may require that I have  treatment with endoscopy or surgery. Malabsorption may lead to diarrhea, foul smelling gas and  protein malnutrition.  Though rarely, I may require feedings through tubes into the digestive tract  or veins for nourishment. I am aware that in some patients hair loss or thinning may occur in the  rapid weight loss phase. This is usually temporary, but can be permanent in some cases. I have  been taught about the importance of proper hydration after bariatric surgery, and understand that  dehydration is the most common reason for re-admission to the hospital after surgery. Patient initials: ______________  I understand that over time, and especially with forced overeating, the stomach pouch may stretch  (dilate) or the staple lines may break. This can result in weight regain, ulcer formation or both. Patient initials: ______________  As a female undergoing bariatric surgery, I acknowledge that I must prevent pregnancies for at  least 12 to 18 months following surgery. Pregnancy during the rapid weight loss phase can be  dangerous and harmful to both the mother and fetus. Patient initials: ______________  Vitamin and mineral deficiencies can occur after bariatric surgery. I am instructed to take vitamin  and mineral supplements daily for life (including multivitamin, iron, B vitamins, calcium and vitamin  D). Failure to comply with these recommendations could result in my experiencing weakness,  nerve or brain damage, confusion, fatigue, rashes, anemia, hair loss, bone loss, and mood changes. I agree to have lab work at regular intervals to assess for vitamin deficiencies. I understand that it  is imperative that I receive continued follow up care after my bariatric surgery by my surgeon, my  program, or other clinician experienced in bariatric care. If I have an adjustable gastric band (Lap-Band¢ç/Realize), needle adjustments (addition/removal  of saline solution) are required for weight loss and to maintain weight loss.   Patient initials: ______________  After I lose weight, the skin of my arms, legs, abdomen, neck, and face may become wrinkled,  droop or sag. Rashes and infections may occur in between my skin folds. Cosmetic surgery may  be indicated in some cases. This is not considered medically necessary in most cases and is rarely  covered by insurance. Patient initials: ______________  I understand that psychological changes may occur with weight loss as a result of bariatric surgery,  which can affect relationships with my loved ones. I agree to re-engage with a mental health  provider as needed. Patient initials: ______________  Some patients elect to have revisional bariatric surgery (correcting anatomic defects from a  previous bariatric operation). I am aware that in these cases, all of the previously addressed risks  apply, however they are three to five times more common. Patient initials: ______________  Follow-up appointments are vital. I agree to the following schedule of appointments after surgery:  two to three weeks, three months, six months, 12 months, and then annually for life. Additional  appointments may be necessary. Gastric Band patient follow-up is determined by my need for  adjustments but is at least once per year after the first year  Diet Quick Review  7-Day Preoperative Liquid Diet  Benefits:   Reducing intake before surgery will shrink  your liver by depleting glycogen (a form of  stored energy).  Reduced liver size gives better access to  stomach during surgery, which translates  to a safer surgery.  Prevents the last supper syndrome.  Experiencing weight loss before the  procedure encourages postoperative  compliance and jump-starts weight loss. Specifics:   Start seven days before surgery:  ____________________.  NO SOLID FOODS!!   Your surgery will be CANCELED if this  diet is not followed!!!   Minimum of 64 ounces of fluid daily,  including protein drinks.  No added sugar or carbonated beverages.    Continue to take all your prescribed  medication and your vitamin supplements  during this preoperative diet phase. Clear liquids:   Water.  Sugar free, non-carbonated beverages  (crystal light, propel).  Sugar free popsicles.  Sugar free Jell-O.   Fat free, reduced sodium broth .  Decaffeinated coffee or decaffeinated tea  with artificial sweeteners. Protein:   60 grams of protein daily  (in liquid supplements).  Pre-made protein drinks.  Protein powder added to water.  3 gram rule -- limit sugar and fat to less  than 3 grams per 8 ounces.  4 to 6 ounces of low fat/low sugar yogurt  OR cottage cheese three to four times  during the week. [facility] Gastric Bypass and Sleeve Dietary Progression Quick Review     Date of Surgery: _      __________Clear liquid diet.  Begin bariatric clear liquid diet on: ______________________________________________   64 ounces of fluid per day.  Low calorie, low sugar, non-carbonated beverages:  -- Water, Crystal Light, Propel Water, Sugar Free Jell-O, Sugar Free Popsicles, bouillon. -- Start protein supplement during this stage (60 to 70 grams per day). -- Start all vitamin supplements during this stage (see pages 57-58). -- Getting your fluid in and staying hydrated is your number one priority!  The clear liquid diet will last for seven days. ____________________________________________________     t.  Begin bariatric soft and moist diet on: _____________________________________________  Pretty Valderrama This stage of the diet will last for five weeks, unless otherwise instructed by your surgeon.  Begin -- one week after surgery.  End -- six weeks after surgery (or when you follow up with the registered dietitian).  Soft, moist, high protein foods -- three meals per day plus protein supplements. -- Portions should emphasize on soft protein. -- Portions will be a MAXIMUM of 1 ounce of solid food and 2 to 3 ounces of cottage cheese and yogurt.   -- Protein supplements should be between meals and provide 30 to 40 grams per day during  soft protein diet. -- Continue to get 64 ounces of fluid in per day.  Protein foods that are OK (SLOW TRANSITION) on the soft and moist diet:  -- First week on soft protein should focus on yogurt, cottage cheese, eggs, VEGETARIAN refried  beans, black beans, kidney beans and white beans. (NO BAKED BEANS.)  -- Second through fourth weeks should focus on yogurt, cottage cheese, eggs, canned tuna,  canned chicken, tilapia and fish (needs to be soft enough to be cut up with a fork). -- Fifth week on soft protein diet should focus on yogurt, cottage cheese, eggs, canned tuna,  canned chicken, tilapia, fish, salmon, chicken breast or turkey. Remember to continue to get 64 ounces of fluid daily on ALL stages. To be advanced to bariatric maintenance stage of the bariatric diet, follow up with the dietitian  six weeks after surgery, around:   ___________________________________________________  After having a sleeve gastrectomy I will not be able to take NSAIDs  (non-steroidal anti-inflammatory drugs) for _________ weeks. 15. After having a gastric bypass I will not be able to take NSAIDs  (non-steroidal anti-inflammatory drugs) for _____________ weeks     Please discuss all of your current medications with your surgeon at your preoperative appointment. Your surgeon will inform you regarding which medications to stop before surgery and which  medications you are to take the morning of surgery. Medications to Stop  To minimize the risk of blood loss during surgery,  you must avoid or stop taking medicines that  contain anti-inflammatories, blood thinners, arthritis  medications, and herbal supplements seven to 14 days  before your surgery. A nurse from pre-anesthesia  testing will review your list of medication. Your current  medications will be reviewed at your preoperative  appointment with your surgeon. Note: You may take prescribed narcotics, such as  Vicodin, Ultram and Neurontin.   Diabetic Medications  Adjustments in your diabetic medications will be discussed with your surgeon at your  preoperative appointment. Birth Control  In order to decrease your chance of getting a postoperative blood clot you will be required to stop  any oral contraceptive two weeks before your surgery date. During this time it is your responsibility  to use an effective form of birth control. You will be required to take a pregnancy test the morning  of surgery at the hospital and surgery will be canceled if you are pregnant. We strongly encourage  that you resume your birth control two weeks after surgery or after your first menstrual cycle  following surgery. Do NOT start any new medications within a month of surgery without  discussing it with your surgeon and/or bariatric team first.  Non-Steroidal Anti-Inflammatory Drugs (NSAIDs)  Bypass:  One class of medications to avoid after Nandini-en-Y gastric  bypass is NSAIDs. These can cause ulcers or stomach irritation  and are linked to a kind of ulcer called a marginal ulcer after  gastric bypass. Marginal ulcers can bleed or perforate. Usually  they are not fatal, but they can cause months or years of pain,  and are a common cause of re-operation and reversal of gastric  bypass. You will NEVER be able to take NSAIDs again. Your only choice for over the counter pain medication will be  Tylenol (acetaminophen). Steroid use can also be harmful to your stomach but may be necessary in some situations. Please consult with your bariatric surgeon and prescribing physician for approval.  Sleeve gastrectomy:  Following a sleeve gastrectomy you will not be allowed to take NSAIDs unless it has been  discussed and approved by your surgeon. Most commonly taken NSAIDs to be AVOIDED!! 1. Ibuprofen  2. Advil  3. Motrin  4. Excedrin  5. Aspirin  6. Celebrex  7. Naproxen  8. Aleve  9. Voltaren  10. Mobic     ___Pregnancy  It is in your best interest to avoid pregnancy for  12 to 18 months after surgery.  Pregnancy during  the rapid weight loss phase can be dangerous  and harmful to both mother and fetus. Do you have an effective method of birth  control? Please consult with your OB/GYN or  PCP for consultation. Alcohol  Alcohol is not recommended after bariatric  surgery. Alcohol contains calories but minimal  nutrition and will work against your weight  loss goal. For example, wine contains twice  the calories per ounce that regular soda does. The absorption of alcohol changes with gastric  bypass and gastric sleeve because an enzyme  in the stomach which usually begins to digest  alcohol is absent or greatly reduced making  alcohol more potent. Alcohol may also be absorbed more quickly  into the body after gastric bypass or gastric  sleeve. The absorbed alcohol will be more  potent, and studies have demonstrated  that obesity surgery patients reach a higher  alcohol level and maintain the higher levels for  a longer period than others. In some patients  alcohol use can increase and lead to alcohol  dependence or addiction. For all of these  reasons, it is recommended to avoid alcohol  after bariatric surgery. ___________________________________________  Smoking  It is required that ALL patients stop smoking  (including e-cigarettes and marijuana) and  chewing tobacco three months before  surgery. Prior to surgery your surgeon will  order a test to verify that you have quit. Your  surgery will be canceled if you are smoking. Smoking or chewing tobacco leads to  decreased blood supply to your bodys tissues  and delays healing. Smoking harms every  organ in the body and has been linked to:   Blood clots (the leading cause of death after  bariatric surgery).  Marginal ulcers after gastric bypass.  Heart disease.  Stroke.  Chronic obstructive pulmonary  (lung) disease.  Increased risk for hip fracture.  Cataracts.    Cancer of the mouth, throat, esophagus,  larynx (voice box), stomach, pancreas,  bladder, cervix, and kidney. Packing For the [address] your suitcase for the hospital a day or two before your surgery. Anti-skid socks will be provided. Items to Include in Your Bag   Clothing such as short gowns, short pajamas,  shorts, tops, loose-fitting shorts, bathrobe,  capris, etc.   Tennis shoes or flat runner sole shoes that tie.  Toiletries.  Waterless hand .  Eyeglasses, contact lenses and denture cases.  A list of medications you are currently taking,  including frequency and dosages.  Magazines, books, needle work, crossword  puzzles, etc.   A method of payment to pay for prescriptions. What Not to Bring to the [address] [place] [address] wallet or purse.  Jewelry or other valuables.  Open-toe slippers or shoes without backs. Countdown to Surgery  14 to 30 Days   Attend a preoperative class with the  dietitian and bariatric coordinator.  Pre-admission testing will contact you.  Schedule your preoperative appointment  with your surgeon. 10 to 14 Days   Stop taking medications as instructed by  your physician. Seven Days   Start liquid diet.  Stop all NSAIDS/aspirin. Three Days Before Surgery   Begin CHG skin prep. Day Before Surgery   Pack for the hospital.   Surgical time will be provided via phone call.  YOU MAY NOT HAVE ANYTHING TO EAT  OR DRINK AFTER MIDNIGHT ON THE DAY  BEFORE YOUR SURGERY. This includes gum,  mints, water, etc. You may brush your teeth  the morning of surgery but do not swallow  the water. Simply rinse your mouth out. Day of Surgery   Take medications and brush your teeth  with a sip (1 teaspoon) of water (only the  medications you have been instructed to  take by your surgeon).  Remember to report two hours before your  surgery time.  This will give the nursing staff  sufficient time to start IVs, prep you for  surgery and answer questions  If instructed by your surgeon to use sliding scale insulin   o Use regular insulin (Novolog Pen) according to the following insulin sliding scale:  BLOOD SUGAR: AMOUNT OF INSULIN:  Under 150 no insulin  150-200 2 units  201-250 4 units  251-300 6 units  301-350 8 units  351-400 10 units  400 or greater 12 units and call physician     Things to do following the preoperative class:  ? Thoroughly read this binder before surgery. Things to do before surgery:  ? Start the preoperative liquid diet on: _____________________________________________  ? Stop all NSAIDS (see page 30) and aspirin seven days before my surgery: _________________ .  Jonas Jacoboy my doctor(PCP or surgeon) regarding stopping Coumadin, Plavix or  other blood thinners. ? Purchase bariatric clear liquids (Crystal Lite, sugar free Jell-O, broth, sugar free popsicles,  protein supplement) and bariatric soft and moist foods (low fat yogurt, cottage cheese, eggs,  tuna, fish, chicken) so that Im prepared when I get home from the hospital.  ? Purchase all vitamins that will be required following surgery. o Chewable multivitamin -- two per day (ex: Flintstones Complete). o Calcium Citrate -- 1,500 milligrams (500 milligrams, three times a day). o Vitamin B-12 -- 1,000 micrograms daily. o Vitamin D3 -- 5,000 IU daily. ? Create a system to keep track of how many ounces of fluid I am drinking daily  o Postoperative GOAL = minimum of 64 ounces per day. ? Purchase a protein supplement that I like.  o Brand: _ _________________________________________________________________ .  o Ounces: _________________________________________________________________ .  Efren Greenberg of protein per serving: _________________________________________________ . -- 28 --  PATIENT GUIDE TO Evangelista Burton 950  6. YOUR SURGERY  Day of Surgery  Before [place] your teeth -- upon awakening, you may brush your teeth and rinse with water, but do not  swallow the water.    Take medication -- take only the medications as instructed by your provider with a small sip of  water as soon as you get up.  Wear proper clothing that is loose-fitting and easily removed.  Avoid back zippers and pantyhose.  Please remove ALL jewelry (leave ALL jewelry and valuables at home).  Avoid using perfumes, deodorant, shaving creams or any scented lotions.  Bring a case with your name on it to hold your eyeglasses, contact lenses, hearing aids  and dentures. Reporting to the [address] [place] [address] will be asked to arrive approximately two  hours before your scheduled surgery. It is important  that you arrive on time to the hospital to avoid any  problems with starting your surgery on time. In some  cases lateness could result in moving your surgery to  a much later time. Your physicians office will provide  your time of arrival to the hospital.  Where Do You Report the Day of Surgery? [address] [place] [address]: [address] , [place], [address]. Enter through the main entrance. Turn left just past the information desk into the  Registration Office. You will check in for surgery there. You may designate one person to be contacted when your surgery has been completed. -- 36 --  [facility]  Before Surgery  Preoperative     Preoperative Preparation Area  Once you arrive at the hospital you will be escorted to the preoperative preparation area. During the preoperative phase, a nurse will review your medical records and conduct a brief  physical examination to include vital signs (i.e., blood pressure, pulse, temperature, respirations or  breathing). An intravenous tube will be inserted with IV fluids. Your skin will be cleansed with CHG  wipes. If you wear dentures, eyeglasses or contact lenses you will need to remove them at this time.   You will see your surgeon, your anesthesiologist and meet the members of your surgical team.  Medications, such as antibiotics, may be given by anesthetists to decrease your infection risk. Other medications may be given to allay any anxiety you may have. You are allowed to have two family members or friends in the preoperative area before surgery. Going into Surgery  The operating room team will escort you into the operating room where your abdomen will be  prepared for surgery. There will be a time out -- a verification of the correct operative site for  patient safety purposes -- performed. Once in the operating room, monitoring devices, such  as a blood pressure cuff, heart monitor and oxygen monitor, will be attached. You will be given  supplemental oxygen as you are readied for anesthesia.  The average length of time for a laparoscopic sleeve gastrectomy is 60 to 90 minutes.  The average length of time for a Nandini-en-Y gastric bypass is two to two and a half hours. In the Recovery Area  After your surgery is completed, you will be wheeled into the recovery room. In the recovery room:   Nurses will frequently check your vital signs (i.e., blood pressure, pulse, breathing).  Nurses will medicate you for your pain as needed through the IV line.  Nurses will encourage you to take deep breaths and to move your ankles and feet. Please inform your family the length of time in the recoveryYou will move to your hospital room from the recovery area when you are ready. Your post-surgical  recovery begins here. room will olegario  What to Expect During [address]  From the recovery room, you will be transferred to your hospital room on the bariatric unit  known as 2 Surgical. The private rooms are spacious with large windows and beautiful views. The staff is specially trained in caring for bariatric patients and are extremely friendly and  knowledgeable. We look forward to caring for you during your hospital stay. IV -- IV fluids will be given to help nourish your body after surgery. You will also be given IV pain  medication.  IV fluids will continue until you are discharged from the hospital.  Heart rate monitor (telemetry) -- A heart rate monitor will be worn only in the recovery room  unless otherwise indicated. Michael catheter -- A Michael catheter will be placed in your bladder while you are in the OR and  removed when you arrive to the recovery room. Nasal cannula -- Oxygen will be worn in the nose the night of surgery. Anticoagulation -- A blood thinner may be administered to you to prevent blood clots. Lovenox,  an injectable medication will be used. Drainage tube -- A drainage tube may be inserted into your abdomen during surgery. This tube  collects bloody drainage after surgery. This may be removed prior to discharge from the hospital or  you may be discharged with the drain and it will be removed by your surgeon at your postoperative  visit. If you are discharged with a drain you will be taught how to empty it and care for it. After Surgery   If you do not see your providers clean their hands, please ask them to do so.  Family and friends who visit you should not touch the surgical wound or dressings.  Family and friends should clean their hands with soap and water or an alcohol-based hand  rub before and after visiting you. If you do not see them clean their hands, ask them to clean  their hands.  Make sure you understand how to care for your incision before you leave the hospital.   Always clean your hands before and after caring for your incision.  Make sure you know who to contact if you have questions or problems after you get home.  If you have any symptoms of an infection, such as redness and pain at the surgery site, drainage,  or fever, call your doctor immediately.  Ask your nursing staff to help you out of bed to walk within five hours of arriving at your  hospital room. Getting out of bed to walk helps to decrease complications, such as blood clots  and pneumonia.    of Stay  Yl be required to stay in the hospital for at least ONE night, possibly TWO. Lngth of Stay  Cristina De Guzman be required to stay in the hospital for at least ONE night, possibly TWO.  edications and Pain Control Options  There are many types of pain control methods that are available to control discomfort. Effective  pain control will allow you to be up and walking shortly after surgery. Your doctor will choose  the method that is right for you. Regardless of the method of pain medication being used, it is  important for you to communicate with your nurse if the pain medication is not enough. Call your  nurse for pain medication when the pain is moderate instead of waiting for when pain is severe. What type of pain should I expect?  Abdominal pain   Rib pain   Shoulder pain   Brick feeling in the center of your chest  Nausea:  Nausea following bariatric surgery is very  common. Causes include:   Anesthesia   Drinking too fast   Pain medication   Surgery itself           Length of Stay  You willExpectations on your Day of Surgery   You will be allowed 1 to 2 ounces of ice chips per hour when you are admitted to the floor. They are solely for your comfort. They are not required.  You will be expected to walk the night of your surgery. Please ask your nurse or nursing assistant  for help the first time you walk. Please do not walk if you still feel groggy or unsteady on your feet.  Your pain will be controlled with oral and IV pain medication on the day of surgery.  In order to prevent pneumonia after surgery you please use your incentive spirometer (ICS)  at least 10 times per hour (see below). be [email or web address]  Bariatric Clear Liquid Diet  You will be started on the bariatric clear liquid diet the morning after your surgery. Your GOAL  will be to drink 4 ounces per hour (SLOW and STEADY) of the following liquids: water, crystal lite,  Jell-O, broth and Unjury (protein supplement).  Feel free to bring your favorite protein supplement  from home.   Two important requirements when drinking is you must slowly SIP the fluid and you must be  SITTING up. Walking  while in the hospital you are expected to walk EVERY hour in the hallway. During your hospital  stay you will also be expected to sit in the recliner throughout the day rather than lie in bed. Pain Medication  The morning after surgery you will continue with oral pain medication ONLY for your pain control. Incentive Spirometer  Continue using your incentive spirometer(ICS) 10 times per hour.

## 2021-07-09 ENCOUNTER — TELEPHONE (OUTPATIENT)
Dept: SURGERY | Age: 36
End: 2021-07-09

## 2021-07-09 NOTE — TELEPHONE ENCOUNTER
Called patient about upcoming surgery. Insurance is requiring additional visits with dietician (6 more) and documentation of sleep apnea questionnaire. Scheduled visit with Prudencio Gaines for 7/15 at 9:30. Surgery for 7/19/21 has been cancelled and will be rescheduled after completion of additional visits. Patient verbalized understanding.

## 2021-07-15 ENCOUNTER — HOSPITAL ENCOUNTER (OUTPATIENT)
Dept: BARIATRICS/WEIGHT MGMT | Age: 36
Discharge: HOME | End: 2021-07-15

## 2021-07-15 NOTE — PROGRESS NOTES
[facility]  [facility], [address]    Patient's Name: Kamila Daniel   Age: 43 y.o. YOB: 1985   Sex: female    Date:   7/15/2021    Session: 7 of 12  Surgeon:  Tessie Diaz    Height: 63\" Weight:    329      Lbs  BMI: 58     Starting Weight: 332     Overall Pounds Lost: 3   Overall Pounds Gained: 0      Do you smoke? Pt does not smoke    Alcohol intake: Rarely  Number of drinks at a time:  1-2  Number of times a week: not weekly, just socially    Class Guidelines    Guidelines are reviewed with patient at the start of every class. 1. Patient understands that weight loss trial classes must be consecutive. Patient understands if they miss a class, it is their responsibility to contact me to reschedule class. I will reach out to patient after their first no show. 2.  Patient understands the expectations that weight maintenance/weight loss is expected during the classes. Failure to demonstrate changes may result in one extra month of weight loss trial, followed by going back to see the surgeon. 3. Patient is also instructed to be doing their labs, blood work, psych visit, support group and any other test that the surgeon has used while they are working on their weight loss trial.    Changes Made: limiting carbs; having yogurt for breakfast; choosing protein based snacks; no sodas; getting grilled options      Dietary Instruction    During today's class, we continued to focus on the key diet principles. Patient was instructed to follow a low carbohydrate diet, focusing on meat and vegetables. Patient was instructed to stop liquid calories and aim for 64 ounces of water per day.  We focused on focusing in on bigger problem areas to start making changes to, such as reducing fast food intake, reducing carbonated beverages/soda intake, decreasing carbohydrates intake daily, etc. We reviewed protein shakes and high protein yogurts to chose, as well.     We also reviewed some ways to stay on track with diet, food behavior, and exercise goals, which included:  1. Portion control and setting up plates with 1/2 veggies, 1/4 protein and 1/4 starch or fruit. a. Using smaller plates and utensils  b. Looking at servings sizes  2. Making healthy protein choices, with low-fat/lean sources of meat examples  3. Menu guide when dining out and choosing baked, grilled, broiled foods, and avoiding foods that are naturally higher in fat and carbohydrates, such as \"piedad, au gratin, battered, breaded, deep-fried\" items listed on the menus. a. Requesting sauces and dressings on the side. 4. Navigating through common food triggers, such as eating in front of the TV, raiding the fridge after coming home from work, and eating out of emotions. Action plans included making the TV a no eating zone, having more consistency with meals, and differentiating between emotional hunger and physical hunger. 5. Decreasing visual temptations tips such as sticking to a grocery list, shopping when not hungry, keeping problem foods away, and keeping healthier foods/snack items in closer reach. 6. Limiting umber of times eating throughout the day, places where eating and watching out for danger zones to help stay on track! 7. Keeping a food journal or food tracking kindra to increase accountability  8. Starting up an exercise routine, as simple as walking 10-15 minutes/day to begin with, and building on it throughout the next few months. a. Stress reliever  b. Increase in metabolism  c. Reduces blood pressure  d. Continual weight loss  9. Getting a walking dwayne to help increase accountability. 10. Ways to prevent relapse, included:  a. Recognizing reactions  b. Come in with a plan  c. Remove temptations or environments that are conducive to temptations.     Patient's dietary habits include: Eating 3 meals daily, consisting of yogurt and eggs; deli meats; chicken w/ side of green beans or broccoli. She does not really fill up during meals, just takes a break and stops eating meal when she feels ready to. Starches consumed: potatoes. Snacking 2-3x/day on string cheese, yogurt, protein drinks, and deli meats. Drinking 48-64 oz water/day, occasional smoothie, protein Gatorade zero, and ocean spray diet drink. Physical Activity/Exercise    Comments:     Currently for exercise, patient is walking 1x/month - not consistent. We talked about activities for patient to do, including walking, swimming, or chair exercises, as well as some additional steps to take in the day to get extra movement, such as parking further away, walking dog, taking stairs vs elevator, etc. Patient was encouraged to start up an exercise routine and build on it. Behavior Modification  Comments:   Emphasized the importance of eating slowly, not eating and drinking meals at the same time. Discussed Key Behavior principles to start implementing to be successful following surgery, such as, importance of 3 meals daily, keeping a food journal, avoid distractions during meal times, and chewing food thoroughly, taking 20-30 minutes to eat a meal, as a few examples. Patient understands the importance of following through with these behaviors following surgery to aid in long term weight loss. Tips and advice were given on how to start implementing these into the patient's life. Patient's S.M.A.R.T. Goals are:  1. Exercise more  2. Find something to have as a side for dinner  3. Drink more water    Patient has attended a support group meeting.       Jose G Garduno, BONIFACIO  7/15/2021

## 2021-07-29 ENCOUNTER — DOCUMENTATION ONLY (OUTPATIENT)
Dept: BARIATRICS/WEIGHT MGMT | Age: 36
End: 2021-07-29

## 2021-07-29 ENCOUNTER — HOSPITAL ENCOUNTER (OUTPATIENT)
Dept: BARIATRICS/WEIGHT MGMT | Age: 36
Discharge: HOME | End: 2021-07-29

## 2021-07-29 NOTE — PROGRESS NOTES
[facility]  [facility], [address]    Behavior Modification Intervention    Patient's Name: Jolynn Cobb   Age: 43 y.o. YOB: 1985   Sex: female    Date:   7/29/2021              Session: 8 of 12  Surgeon:  Raz Rubio    Height: 63\"   Weight:    326      Lbs. BMI: 57.7     Starting Weight: 332  Previous Visit Weight: 329  Overall Pounds Lost: 8   Overall Pounds Gained: 0    Do you smoke? Pt does not smoke    Alcohol intake:  Pt does not drink  Number of drinks at a time:  0  Number of times a week: 0    Class Guidelines    Guidelines are reviewed with patient at the start of every class. 1. Patient understands that weight loss trial classes must be consecutive. Patient understands if they miss a class, it is their responsibility to contact me to reschedule class. I will reach out to patient after their first no show. 2.  Patient understands the expectations that weight maintenance/weight loss is expected during the classes. Failure to demonstrate changes may result in one extra month of weight loss trial, followed by going back to see the surgeon. 3. Patient is also instructed to be doing their labs, blood work, psych visit, support group and any other test that the surgeon has used while they are working on their weight loss trial.   Patient understands that they CAN NOT gain any weight during the weight loss trial.  Gaining weight will result in extra classes    Other Pertinent Information:         Behavior Modification Intervention    During this nutrition visit, the Registered Dietitian talked to patient about the following behavior modification: The Power of Walking. Patient has been challenged to incorporate more walking throughout their weight loss journey. This can be done by:  1. Walking for at least 30 minutes/day 5x/week  2. Reaching the recommended goal of 10,000 steps/day. 3. Using a pedometer.   4. Adding in stretches to walks. 5. Finding an exercise kindra that is appealing to the patient. 6. Standing at your desk when working. 7. Attending exercise classes or hiring a . 8. Importance of hydration before, during and after an exercise session to avoid dehydration. 9. Setting SMART goals. 10. Trying to avoid roadblocks, by choosing a different routine, having a walking dwayne and staying committed during plateaus. I have reviewed with patient the power point on the benefits of walking during their weight loss journey. We talked about all the benefits, which include:  1. Better stress management. 2. Improvements in sleep and cognitive function. 3. Relief in depression and stress reduction. 4. Keeping bones strong and reduce the risk of arthritis. 5. Prevent and manage diabetes by improving insulin and lowering blood sugar. Patient was given ideas and a guide of tips in how to incorporate walking into their daily life. Patient felt that these strategies were something they can fit into their routine. Currently patient has joined a gym, exercising consistently throughout the week in an effort to practice this. Today Registered Dietitian met with the patient and assessed how patient is doing with the goals that were set. Patient states they are walking more, using a recumbent bike, joined the gym. Needs to work on increasing water. Being consistent with meals. Working on small sips of water and buying a more convenient way to drink fluids. Has looked into buying vitamins    RD also re-emphasized the diet key principles.  Patient was encouraged to start drinking 64 ounces of fluid per day.  Patient was encouraged to start cutting out soda, caffeine, carbonation, sweet tea, fruit juice, and fruit smoothies.  Patient was instructed to lower carbohydrates to 75 grams or less per day and focus on protein-based foods.    Patient was also instructed to work towards 30 minutes of activity per day. Suggestions for activity were reviewed. Will follow up in 2 weeks.       Elle Abbott RD

## 2021-08-12 ENCOUNTER — HOSPITAL ENCOUNTER (OUTPATIENT)
Dept: BARIATRICS/WEIGHT MGMT | Age: 36
Discharge: HOME | End: 2021-08-12

## 2021-08-12 NOTE — PROGRESS NOTES
[facility]  [facility], [address]    Patient's Name: Sherice Cortez   Age: 43 y.o. YOB: 1985   Sex: female    Date:   8/12/2021    Session: 9 of 12  Surgeon:  David Mcclain    Height: 63\" Weight:    329      Lbs  BMI: 58     Previous Month's Weight: 329  Pounds Lost since last month: 0                 Pounds Gained since last month: 0    Starting Weight: 332     Overall Pounds Lost: 3   Overall Pounds Gained: 0      Do you smoke? Pt does not smoke    Alcohol intake: rarely   Number of drinks at a time:  1-2  Number of times a week: only socially, not weekly    Class Guidelines    Guidelines are reviewed with patient at the start of every class. 1. Patient understands that weight loss trial classes must be consecutive. Patient understands if they miss a class, it is their responsibility to contact me to reschedule class. I will reach out to patient after their first no show. 2.  Patient understands the expectations that weight maintenance/weight loss is expected during the classes. Failure to demonstrate changes may result in one extra month of weight loss trial, followed by going back to see the surgeon. 3. Patient is also instructed to be doing their labs, blood work, psych visit, support group and any other test that the surgeon has used while they are working on their weight loss trial.    Changes Made: Joined local [facility] center and started a walking/biking routine      Dietary Instruction    During today's class, we continued to focus on the key diet principles. Patient was instructed to follow a low carbohydrate diet, focusing on meat and vegetables. Patient was instructed to stop liquid calories and aim for 64 ounces of water per day.  We focused on focusing in on bigger problem areas to start making changes to, such as reducing fast food intake, reducing carbonated beverages/soda intake, decreasing carbohydrates intake daily, etc. We reviewed protein shakes and high protein yogurts to chose, as well. We also reviewed some ways to combat emotional eating and cravings, which included:  1. Recognizing what physical hunger feels like vs emotional hunger/cravings  2. Recognizing triggers, whether psychological, emotional or food-related. 3. Starting to listen to what your body is telling you and admitting that there is a habit in order to break it  4. Breaking the association of feelings w/ food and replacing with alternative activities in place of eating, such as meditation, exercise, crafting, new hobbies, positive self talk, etc.  5. Working on not skipping meals to reduce cravings. 6. Confrontation vs. Distraction strategies in fighting emotional eating and cravings  7. Ways to curb cravings, such as sucking on a sour pickle, making TV a no eating zone, fool your sweet tooth, etc.  8. Working on stress reduction and managing stress in areas not revolving around food. Patient's dietary habits include: Eating 3 meals daily, consisting of chicken and green beans. Starches consumed include low carb tortillas. Snacking 2-3x/day on cheese, yogurt, boom chickapop popcorn, and pickles. Struggling most w/ drinking enough water during the day. Drinking 24 oz of water daily, 16 oz of coffee and [employer] milk. Physical Activity/Exercise    Comments:     Currently for exercise, patient is using the recumbent bike 2-3x/week. We talked about activities for patient to do, including walking, swimming, or chair exercises, as well as some additional steps to take in the day to get extra movement, such as parking further away, walking dog, taking stairs vs elevator, etc. Patient was encouraged to start up an exercise routine and build on it. Behavior Modification  Comments:   Emphasized the importance of eating slowly, not eating and drinking meals at the same time.   Discussed Key Behavior principles to start implementing to be successful following surgery, such as, importance of 3 meals daily, keeping a food journal, avoid distractions during meal times, and chewing food thoroughly, taking 20-30 minutes to eat a meal, as a few examples. Patient understands the importance of following through with these behaviors following surgery to aid in long term weight loss. Tips and advice were given on how to start implementing these into the patient's life. Patient's S.M.A.R.T. Goals are:  1. Keep going to the gym  2. Drink more water! 3. Eating when needing to - not on a schedule     Patient has attended a support group meeting. Patient has noticed that she has been able to walk further than usual in a long time.        Sam Koyanagi, RD  8/12/2021

## 2021-08-25 ENCOUNTER — HOSPITAL ENCOUNTER (OUTPATIENT)
Dept: BARIATRICS/WEIGHT MGMT | Age: 36
Discharge: HOME | End: 2021-08-25

## 2021-08-25 ENCOUNTER — DOCUMENTATION ONLY (OUTPATIENT)
Dept: BARIATRICS/WEIGHT MGMT | Age: 36
End: 2021-08-25

## 2021-08-25 NOTE — PROGRESS NOTES
[facility]  [address] [facility], [address]    Behavior Modification Intervention    Patient's Name: Jeffrey Nur   Age: 43 y.o. YOB: 1985   Sex: female    Date:   8/25/2021              Session: 8 of 12  Surgeon:  Dr. Renuka Perkins    Height: 5 f 3   Weight:    326      Lbs. BMI: 57.9   Pounds Lost since last month: 3                 Pounds Gained since last month: 0    Starting Weight: 332     Previous Month's Weight: 329  Overall Pounds Lost: 6   Overall Pounds Gained: 0    Do you smoke? None    Alcohol intake:  Number of drinks at a time:  None  Number of times a week: None    Class Guidelines    Guidelines are reviewed with patient at the start of every class. 1. Patient understands that weight loss trial classes must be consecutive. Patient understands if they miss a class, it is their responsibility to contact me to reschedule class. I will reach out to patient after their first no show. 2.  Patient understands the expectations that weight maintenance/weight loss is expected during the classes. Failure to demonstrate changes may result in one extra month of weight loss trial, followed by going back to see the surgeon. 3. Patient is also instructed to be doing their labs, blood work, psych visit, support group and any other test that the surgeon has used while they are working on their weight loss trial.   Patient understands that they CAN NOT gain any weight during the weight loss trial.  Gaining weight will result in extra classes    Other Pertinent Information:           Behavior Modification Intervention    At the last nutrition visit, Registered Dietitian talked to patient about the following behavior modification:  Meal Preparation   Patient has been challenged to work planning meals ahead and doing more meal preparation. This can be done by:  1. Utilizing an Le to help the patient.     2. Use online recipes to create your own preferred meals. 3. Implement a shopping list that will work for the patient. 4. Consider online shopping. 5. Sit down the evening before and jot down what you plan to eat the next day. Other recommendations for patient to consider include:  1. Never shop hungry. 2. For smaller families, only prepare portion sizes of what you need for each meal unless you are packing lunches for leftovers. Behavior Challenge of Tracking Food Intake:  1. Commit to tracking food whether on paper on or in an kindra. Patient felt that they are doing okay with this goal.  She works from home, so meal prep is a little easier. Patient has tracking her food using carb manager. She states if she feels she is off her wagon, she will start tracking again. She states this has started making her more aware of what she is eating. Patient is also encouraged to take pictures of their food to record later. This will allow the patient to go back and put in tracker later when they may have more time. Patient was also given a list of apps to use to track food. We also talked about the importance of meal planning and tips that could help with this. Today Registered Dietitian met with the patient and assessed how patient is doing with the goals that were set. Patient states they are doing well. She states she recently started walking now. She states she is doing this 3-5 x a week and is increasing the duration. She aims for 1/2 mile- 1 mile. She states she has switched to [employer] milk and is eating less sugar. She states she has cut out fast food. She states she has not been eating bread and is using a low carb wrap (4 grams). She states she is doing okay with her fluid, but does much better with a straw, which she is trying to get rid of.    RD also re-emphasized the diet key principles.  Patient was encouraged to start drinking 64 ounces of fluid per day.      Patient was encouraged to start cutting out soda, caffeine, carbonation, sweet tea, fruit juice, and fruit smoothies.  Patient was instructed to lower carbohydrates to 75 grams or less per day and focus on protein-based foods.  Patient was also instructed to work towards 30 minutes of activity per day. Suggestions for activity were reviewed. Will follow up in 2 weeks.     Chanelle Diego MS RD

## 2021-09-09 ENCOUNTER — DOCUMENTATION ONLY (OUTPATIENT)
Dept: BARIATRICS/WEIGHT MGMT | Age: 36
End: 2021-09-09

## 2021-09-09 ENCOUNTER — HOSPITAL ENCOUNTER (OUTPATIENT)
Dept: BARIATRICS/WEIGHT MGMT | Age: 36
Discharge: HOME | End: 2021-09-09

## 2021-09-09 NOTE — PROGRESS NOTES
[address] [facility] [place] [address] [facility], [address]    Patient's Name: Lucia Gomez   Age: 43 y.o. YOB: 1985   Sex: female    Date:   9/9/2021    Insurance:              Session: 6 of 12  Surgeon:  Dr. Kami Terry    Height: 5 f 3   Weight:    328      Lbs. BMI: 58.2   Pounds Lost since last month: 0                Pounds Gained since last month: 2    Starting Weight: 332     Previous Months Weight: 326  Overall Pounds Lost: 4   Overall Pounds Gained: 0    Smoking:  None    Alcohol intake:  Number of drinks at a time:  Rarely  Number of times a week:     Class Guidelines    Guidelines are reviewed with patient at the start of every class. 1. Patient understands that weight loss trial classes must be consecutive. Patient understands if they miss a class, it is their responsibility to contact me to reschedule class. I will reach out to patient after their first no show. 2.  Patient understands the expectations that weight maintenance/weight loss is expected during the classes. Failure to demonstrate changes may result in one extra month of weight loss trial, followed by going back to see the surgeon. 3. Patient is also instructed to be doing their labs, blood work, psych visit, support group and any other test that the surgeon has used while they are working on their weight loss trial.    Other Pertinent Information:     Changes Made Since Last Class:     Eating Habits and Behaviors      During today's class, we continued to focus on the key diet principles. Patient was instructed to follow a low carbohydrate diet, focusing on meat and vegetables. Patient was instructed to stop liquid calories and aim for 64 ounces of water per day.  We focused on focusing in on bigger problem areas to start making changes to, such as reducing fast food intake, reducing carbonated beverages/soda intake, decreasing carbohydrates intake daily, etc. We reviewed protein shakes and high protein yogurts to chose, as well. During today's class, we also talked about how to read a label. Patient was given information on:  1. The benefits of reading a label, which allowed one to compare the nutritional value of similar products and make healthy food decisions. 2. The ingredient list, which can help to determine if the food is heathy or something that fits into the diet. 3. The importance of reading the serving size and making sure to apply that to the portion size that they are consuming. Patient was also educated on carbohydrates. I talked to patient about:  1. The function of carbohydrates. 2. Foods that carbohydrate-heavy. 3. Patient was given the guidelines to keep their carbohydrates less than 75 grams per day in the pre-op phase. 4. Patient was also given ideas of low carb swaps, which include zucchini noodles, spaghetti squash, or cauliflower rice. 5. Lower carbohydrate fruit options were discussed. 6. Discussed lower carb swaps to use instead of potato chips. Patient's dietary habits include: Patient is eating 3 meal per day. Meals are made up of sausage, cheese wraps, deli meat, low carbohydrates tortilla, pickles, yogurt, grilled chicken. Portions are:  Dinner size. Patient is eating out: 0-1 x a week. Patient is snacking on cheese, yogurt, chips, milk, or pickles. I have talked to patient about some lower carbohydrate snack choices that focused more protein. Patient is drinking 38-45 ounces of fluid per day. Fluid intake is make up of: water and a 35 calorie juice. Physical Activity/Exercise     Comments:     Currently for exercise, patient is going to the gym for 30 minutes, 2-3 x a week. I have talked to patient about some suggestions to start an exercise routine. Patient is encouraged to purchase a pedometer and use this to track her steps.   I have made some suggestions to patient of ways to incorporate exercise in with a busy lifestyle. We also talked about You Tube videos that can be used for an exercise routine. Behavior Modification  Comments:  Behavior modifications were discussed with the patient. Some of those behavior modifications include:  1. Emphasized the importance of eating slowly, not eating and drinking meals at the same time. 2.  Taking 20-30 minutes to eat a meal  3. I have encouraged patient to follow journal, which may be done by paper or tracking it an kindra, such as My Fitness Pal or [employer]. [address] Final. Patient understands the importance of following through with these behaviors following surgery to aid in long term weight loss. Tips and advice were given on how to start implementing these into the patient's life. Patient has attended the required bariatric support group. Goal patient has set for next month:  1. Finding sugar free pop  2. Stay consistent with going to the gym.     Hellen Duffy Alexander 87 RD  9/9/2021

## 2021-09-21 ENCOUNTER — DOCUMENTATION ONLY (OUTPATIENT)
Dept: BARIATRICS/WEIGHT MGMT | Age: 36
End: 2021-09-21

## 2021-09-21 ENCOUNTER — HOSPITAL ENCOUNTER (OUTPATIENT)
Dept: BARIATRICS/WEIGHT MGMT | Age: 36
Discharge: HOME | End: 2021-09-21

## 2021-09-21 NOTE — PROGRESS NOTES
[facility]  [address], [address]    Behavior Modification Intervention    Patient's Name: Merlyn Jorgensen   Age: 43 y.o. YOB: 1985   Sex: female    Date:   9/21/2021              Session: 12 of 12  Surgeon:  Dr. Jasper Jaramillo    Height: 5 f 3   Weight:    322      Lbs. BMI: 57.1   Pounds Lost since last month: 6                 Pounds Gained since last month: 0    Starting Weight: 332     Previous Month's Weight: 328  Overall Pounds Lost: 10   Overall Pounds Gained: 0    Do you smoke? None    Alcohol intake:  Number of drinks at a time:  None  Number of times a week: None    Class Guidelines    Guidelines are reviewed with patient at the start of every class. 1. Patient understands that weight loss trial classes must be consecutive. Patient understands if they miss a class, it is their responsibility to contact me to reschedule class. I will reach out to patient after their first no show. 2.  Patient understands the expectations that weight maintenance/weight loss is expected during the classes. Failure to demonstrate changes may result in one extra month of weight loss trial, followed by going back to see the surgeon. 3. Patient is also instructed to be doing their labs, blood work, psych visit, support group and any other test that the surgeon has used while they are working on their weight loss trial.   Patient understands that they CAN NOT gain any weight during the weight loss trial.  Gaining weight will result in extra classes    Other Pertinent Information:           Behavior Modification Intervention    At the last nutrition visit, Registered Dietitian talked to patient about the following behavior modification:  Increasing fluid intake. Patient has been challenged to work on increasing fluid intake to a minimum of 64 ounces and decreasing any liquid calories they may be consuming.     Patient was given a power point on the importance of getting adequate fluid in and ideas for drink choices. The lesson also discussed the need to cut out caffeine and carbonation and signs and symptoms of dehydration were addressed with the patient. Recommendations to help achieve this include:  1. Having a designated water bottle and sip on fluid throughout the day. 2. Use an kindra, such as My Fitness Pal or Water Logged to increase fluid. 3. Purchase the 8 ounce water bottles and aim for 1 every hours. Behavior Challenge of Tracking Fluid  1. Commit to tracking fluid whether on paper on or in an kindra. Patient felt that they working hard on trying to get better with this goal.  She states this has been a challenge for her to do. She states if she counts her protein shakes, she is getting close to the 64 ounces of fluid in. Patient has been tracking their fluid intake. Patient was also given a list of apps to use to track fluid. Today Registered Dietitian met with the patient and assessed how patient is doing with the goals that were set. Patient states they are doing well with her eating and has been cutting her carbohydrates out. RD also re-emphasized the diet key principles.  Patient was encouraged to start drinking 64 ounces of fluid per day.  Patient was encouraged to start cutting out soda, caffeine, carbonation, sweet tea, fruit juice, and fruit smoothies.  Patient was instructed to lower carbohydrates to 75 grams or less per day and focus on protein-based foods.  Patient was also instructed to work towards 30 minutes of activity per day. Suggestions for activity were reviewed. Will follow up in 2 weeks.     July Early MS RD

## 2021-09-21 NOTE — PROGRESS NOTES
Nutrition Evaluation    Patient's Name: Eugenio Mejia   Age: 43 y.o. YOB: 1985   Sex: female    Height: 5 f 3 Weight: 322 BMI:  57.2  Starting Weight:  332        Smoking Status:  None  Alcohol Intake:  Number of Drinks at a Time: None  Number of Times a Week: None    Changes made during classes include:  No soda  No fast food (limited)  Drinking a protein drink for at least 1 meal per day. Patient is doing to the gym or walking  Patient states she has cut out desserts  Cut out a lot of sweets    Summary:  I feel that Eugenio Mejia has demonstrated appropriate diet changes and is ready to move forward with surgery. Patient has been briefed on the importance of the protein drinks, vitamins, and the transition of the diet stages. Patient understands that the long-term diet will focus on protein and vegetables. Patient understand the effects of carbohydrates after surgery and what reactive hypoglycemia is. Patient is aware that they will be attending pre-op class 2 weeks before surgery and will get more detailed information on the post-op diet guidelines. Patient will see me again at 6 weeks post-op. At this 6 week visit, RD will assess how patient is tolerating soft protein and advance to vegetables, if tolerating soft protein without difficulty. Patient will also see RD again at 9 months post-op. This visit will assess patient's compliance with current protocol, including diet, vitamins, protein shakes, and exercise. Post-op diet guidelines will be reinforced. RD is available for questions and to meet with patient outside of the 6 week and 9 month post-op visit. We spent a lot of time talking about the vitamins. Patient understands the importance of being compliant with the diet protocol and the complications and risks that can occur if they are non-compliant with the nutritional protocol. Patient has attended at least one support group.     Candidate for surgery: Yes  Re-evaluation Date:     Procedure:  Gastric Bypass    Hellen Walters Alexander 87 RD  9/21/2021

## 2021-09-23 ENCOUNTER — OFFICE VISIT (OUTPATIENT)
Dept: SURGERY | Age: 36
End: 2021-09-23
Payer: COMMERCIAL

## 2021-09-23 VITALS
HEART RATE: 91 BPM | DIASTOLIC BLOOD PRESSURE: 75 MMHG | RESPIRATION RATE: 20 BRPM | BODY MASS INDEX: 51.91 KG/M2 | HEIGHT: 63 IN | WEIGHT: 293 LBS | TEMPERATURE: 98 F | SYSTOLIC BLOOD PRESSURE: 126 MMHG

## 2021-09-23 DIAGNOSIS — E66.01 MORBID OBESITY WITH BMI OF 50.0-59.9, ADULT (HCC): Primary | ICD-10-CM

## 2021-09-23 PROCEDURE — 99214 OFFICE O/P EST MOD 30 MIN: CPT | Performed by: SURGERY

## 2021-09-23 RX ORDER — DEXTROAMPHETAMINE SACCHARATE, AMPHETAMINE ASPARTATE, DEXTROAMPHETAMINE SULFATE AND AMPHETAMINE SULFATE 5; 5; 5; 5 MG/1; MG/1; MG/1; MG/1
TABLET ORAL
COMMUNITY
End: 2021-11-04

## 2021-09-23 NOTE — PROGRESS NOTES
Preop History and Physical Exam:    Yoli Ludwig is a 43 y.o. female who was initially evaluated in this office September 24, 2020 for discussion of surgical options available for definitive management of her super obesity. The patient at that time weighed 332 pounds and a 5 to 3 inch frame with a body mass index of 58 with obesity related comorbidities of hypertension, reactive airway disease, clinical obstructive sleep apnea, weight related arthropathy. Patient after discussing surgical options elected pursue laparoscopic tensely open Nandini-en-Y gastric bypass to achieve definitive durable weight loss on a personal level expected resolution of obesity related comorbidities. The patient presents today after completing all multidisciplinary bariatric surgical programmatic requirements necessary prior to the pursuit of bariatric surgery for discussion of the diagnostic evaluation and surgical scheduling with the only new medical history being now utilizing Adderall for treatment of her ADD. Patient currently weighs 322 pounds on a 5 foot 3 inch frame with a body mass index of 57 with obesity related comorbidities hypertension, reactive airway disease, clinical obstructive sleep apnea and weight related arthropathy. Ideal body weight 131 pounds, excess body weight 191 pounds, estimated postsurgical weight loss based on 80 % of her excess body weight 153 pounds, postsurgical goal weight: 169 pounds.     Past Medical History:   Diagnosis Date    Anxiety     Asthma     Depression     Hypertension     Musculoskeletal disorder     ruputre disk       Past Surgical History:   Procedure Laterality Date    HX DILATION AND EVACUATION  03/22/2019    HX LUMBAR DISKECTOMY  11/2020    HX OTHER SURGICAL      Bladder surger 1990    HX WISDOM TEETH EXTRACTION         Current Outpatient Medications   Medication Sig Dispense Refill    dextroamphetamine-amphetamine (ADDERALL) 20 mg tablet dextroamphetamine-amphetamine 20 mg tablet      FLUoxetine (PROzac) 10 mg capsule Take  by mouth daily.  ARIPiprazole (Abilify) 10 mg tablet Take 10 mg by mouth daily.  VENTOLIN HFA 90 mcg/actuation inhaler INHALE 1 PUFF BY MOUTH EVERY 4 HOURS AS NEEDED FOR WHEEZING 18 g 2    montelukast (SINGULAIR) 10 mg tablet TAKE 1 TABLET BY MOUTH DAILY 30 Tab 4    lisinopril (PRINIVIL, ZESTRIL) 20 mg tablet TAKE 1 TABLET BY MOUTH EVERY DAY (Patient not taking: Reported on 9/23/2021) 90 Tab 0    chlorthalidone (HYGROTEN) 25 mg tablet Take 1 Tab by mouth daily. (Patient not taking: Reported on 9/23/2021) 90 Tab 2       Allergies   Allergen Reactions    Ativan [Lorazepam] Other (comments)     Headaches     Other Plant, Animal, Environmental Other (comments)       Social History     Tobacco Use    Smoking status: Never Smoker    Smokeless tobacco: Never Used   Vaping Use    Vaping Use: Never used   Substance Use Topics    Alcohol use:  Yes     Alcohol/week: 1.0 standard drinks     Types: 1 Cans of beer per week     Comment: socially    Drug use: Never       Family History   Problem Relation Age of Onset    Hypertension Mother     Cancer Mother     Hypertension Father     Other Brother         Born with heart murmur    Cancer Maternal Grandmother         Breast    Diabetes Maternal Grandfather        Review of Systems:  Positive in BOLD    CONST: Fever, weight loss, fatigue or chills  GI: Nausea, vomiting, abdominal pain, change in bowel habits, hematochezia, melena, and GERD   INTEG: Dermatitis, abnormal moles  HEENT: Recent changes in vision, vertigo, epistaxis, dysphagia and hoarseness  CV: Chest pain, palpitations, HTN, edema and varicosities  RESP: Cough, shortness of breath, wheezing, hemoptysis, snoring and reactive airway disease  : Hematuria, dysuria, frequency, urgency, nocturia and stress urinary incontinence   MS: Weakness, joint pain and arthritis  ENDO: Diabetes, thyroid disease, polyuria, polydipsia, polyphagia, poor wound healing, heat intolerance, cold intolerance  LYMPH/HEME: Anemia, bruising and history of blood transfusions  NEURO: Dizziness, headache, fainting, seizures and stroke  PSYCH: Anxiety and depression    Physical Exam    Visit Vitals  /75 (BP 1 Location: Left upper arm, BP Patient Position: At rest, BP Cuff Size: Adult)   Pulse 91   Temp 98 °F (36.7 °C) (Oral)   Resp 20   Ht 5' 3\" (1.6 m)   Wt 146.1 kg (322 lb)   BMI 57.04 kg/m²         General: Super obese 25-year-old female in no acute distress  Head: Normocephalic, atraumatic  Resp: Clear to auscultation bilaterally, now wheeze, rhonchi, or rales, excursions normal and symmetrical  Cardio: Regular rate and rhythm, no murmurs, clicks, gallops, or rubs. No edema or varicosities. Abdomen: Obese, soft, nontender, nondistended, normoactive bowel sounds, no hernias, no hepatosplenomegaly,  Psych: Alert and oriented to person, place, and time. October 12, 2020 CBC, CMP, urinalysis, B1, B12, folate, iron, vitamin D, H. pylori: Vitamin D 20.6, glucose 120 with remainder laboratory profile within normal limits. The patient was begun on supplement vitamin D at time of receipt of laboratory profile March 22, 2021 Pap smear: Atypical squamous cells of questionable significance. The patient states she has had a repeat Pap smear done subsequent to that time which was normal  October 12, 2020 upper GI: Normal  October 12, 2020 chest x-ray: Normal  March 2021 bilateral mammography: No evidence of malignancy  April 27, 2021 echocardiogram: Normal with an ejection fraction of 60%  September 21, 2021.   Nutrition: Concurring with pursuit of surgery  March 18, 2021 psychology/Alvarez: Concurring with pursuit of surgery  October 12, 2020 EKG: Normal sinus rhythm with rate of 64 with sinus arrhythmia, left axis deviation    Impression:    Tanesha Singleton is a 43 y.o. white female with a body mass index of 57 with obesity related comorbidities consisting of hypertension, reactive airway disease, clinical obstructive sleep apnea and weight related arthropathy who would benefit from bariatric surgery. We've discussed the restrictive and malabsorptive nature of the gastric bypass and compared and contrasted with the sleeve gastrectomy. The patient understands the likelihood of losing approximately 80% of their excess weight in 12 to 18 months. She also understands the risks including but not limited to bleeding, infection, need for reoperation, anastomotic ulcers, leaks and strictures, bowel obstruction secondary to adhesions and internal hernias, DVT, PE, heart attack, stroke, and death. Patient also understands risks of inadequate weight loss, excess weight loss, vitamin insufficiency, protein malnutrition, excess skin, and loss of hair. We have reviewed the components of a successful postoperative course including requirement for a high protein, low carbohydrate diet, 60 oz a day of zero calorie liquids, daily vitamin supplementation, daily exercise, regular follow-up, and participation in support groups. We have reviewed the preoperative liver shrinking clear liquid diet, as well as reviewed any medication changes required while on the clear liquid diet. In addition, the patient understands that all medications to be taken during the first 8 weeks postoperatively can be taken whole as long as the medication is approximately the size of a Antoinette 325 mg aspirin tablet in size. The patient further understands that it is his/her responsibility to review these and verify with their primary care doctor and pharmacist that all medications are of the appropriate size. We will schedule the patient for laparoscopic gastric bypass  in the near future after obtaining a TSH and lipid panel.

## 2021-09-23 NOTE — PROGRESS NOTES
Jolynn Cobb is a 43 y.o. female who presents today with   Chief Complaint   Patient presents with    Morbid Obesity     Pt presents today to discuss surgical options. Body mass index is 57.04 kg/m². 1. Have you been to the ER, urgent care clinic since your last visit? Hospitalized since your last visit? No    2. Have you seen or consulted any other health care providers outside of the [address] [place] [address] since your last visit? Include any pap smears or colon screening.  No

## 2021-11-03 DIAGNOSIS — Z01.818 PRE-OP TESTING: Primary | ICD-10-CM

## 2021-11-03 DIAGNOSIS — E66.01 MORBID OBESITY WITH BMI OF 50.0-59.9, ADULT (HCC): ICD-10-CM

## 2021-11-04 ENCOUNTER — HOSPITAL ENCOUNTER (OUTPATIENT)
Dept: PREADMISSION TESTING | Age: 36
Discharge: HOME | End: 2021-11-04
Payer: COMMERCIAL

## 2021-11-04 DIAGNOSIS — E66.01 MORBID OBESITY WITH BMI OF 50.0-59.9, ADULT (HCC): ICD-10-CM

## 2021-11-04 DIAGNOSIS — Z01.818 PRE-OP TESTING: ICD-10-CM

## 2021-11-04 PROCEDURE — U0003 INFECTIOUS AGENT DETECTION BY NUCLEIC ACID (DNA OR RNA); SEVERE ACUTE RESPIRATORY SYNDROME CORONAVIRUS 2 (SARS-COV-2) (CORONAVIRUS DISEASE [COVID-19]), AMPLIFIED PROBE TECHNIQUE, MAKING USE OF HIGH THROUGHPUT TECHNOLOGIES AS DESCRIBED BY CMS-2020-01-R: HCPCS

## 2021-11-04 RX ORDER — DEXTROAMPHETAMINE SACCHARATE, AMPHETAMINE ASPARTATE, DEXTROAMPHETAMINE SULFATE AND AMPHETAMINE SULFATE 3.75; 3.75; 3.75; 3.75 MG/1; MG/1; MG/1; MG/1
15 TABLET ORAL 2 TIMES DAILY
COMMUNITY

## 2021-11-04 RX ORDER — ARIPIPRAZOLE 5 MG/1
5 TABLET ORAL DAILY
COMMUNITY

## 2021-11-04 RX ORDER — FLUOXETINE HYDROCHLORIDE 20 MG/1
20 CAPSULE ORAL DAILY
COMMUNITY
End: 2022-05-05

## 2021-11-04 NOTE — PERIOP NOTES
PRE-SURGICAL INSTRUCTIONS        Patient's Name:  Giacomo Reza      CSRWZ'O Date:  11/4/2021            Covid Testing Date and Time: 11/4    Surgery Date:  11/9/2021                1. Do NOT eat or drink anything, including candy, gum, or ice chips after midnight on 11/8, unless you have specific instructions from your surgeon or anesthesia provider to do so.  2. You may brush your teeth before coming to the hospital.  3. No smoking 24 hours prior to the day of surgery. 4. No alcohol 24 hours prior to the day of surgery. 5. No recreational drugs for one week prior to the day of surgery. 6. Leave all valuables, including money/purse, at home. 7. Remove all jewelry, nail polish, acrylic nails, and makeup (including mascara); no lotions powders, deodorant, or perfume/cologne/after shave on the skin. 8. Follow instruction for Hibiclens washes and CHG wipes from surgeon's office. 9. Glasses/contact lenses and dentures may be worn to the hospital.  They will be removed prior to surgery. 10. Call your doctor if symptoms of a cold or illness develop within 24-48 hours prior to your surgery. 11.  If you are having an outpatient procedure, please make arrangements for a responsible ADULT TO [address] [place] [address] and stay with you for 24 hours after your surgery. 12. ONE VISITOR in the hospital at this time for outpatient procedures. Exceptions may be made for surgical admissions, per nursing unit guidelines      Special Instructions:      Bring list of CURRENT medications. Bring inhaler. Bring CPAP machine. Bring any pertinent legal medical records. Take these medications the morning of surgery with a sip of water:  AM medications except Adderall. On the day of surgery, come in the main entrance of [facility]. Let the  at the desk know you are there for surgery. A staff member will come escort you to the surgical area on the second floor.     If you have any questions or concerns, please do not hesitate to call:     (Prior to the day of surgery) [facility] department:  [phone]   (Day of surgery) Pre-Op department:  [phone]    These surgical instructions were reviewed with Michelle Kim during the [facility] phone call.

## 2021-11-05 LAB — SARS-COV-2, NAA: NOT DETECTED

## 2021-11-08 ENCOUNTER — ANESTHESIA EVENT (OUTPATIENT)
Dept: SURGERY | Age: 36
DRG: 621 | End: 2021-11-08
Payer: COMMERCIAL

## 2021-11-09 ENCOUNTER — ANESTHESIA (OUTPATIENT)
Dept: SURGERY | Age: 36
DRG: 621 | End: 2021-11-09
Payer: COMMERCIAL

## 2021-11-09 ENCOUNTER — HOSPITAL ENCOUNTER (INPATIENT)
Age: 36
LOS: 1 days | Discharge: HOME | DRG: 621 | End: 2021-11-10
Attending: SURGERY | Admitting: SURGERY
Payer: COMMERCIAL

## 2021-11-09 DIAGNOSIS — G89.18 POST-OP PAIN: Primary | ICD-10-CM

## 2021-11-09 DIAGNOSIS — E66.01 MORBID OBESITY WITH BMI OF 50.0-59.9, ADULT (HCC): ICD-10-CM

## 2021-11-09 LAB — HCG UR QL: NEGATIVE

## 2021-11-09 PROCEDURE — C9290 INJ, BUPIVACAINE LIPOSOME: HCPCS | Performed by: SURGERY

## 2021-11-09 PROCEDURE — 43645 LAP GASTR BYPASS INCL SMLL I: CPT | Performed by: SURGERY

## 2021-11-09 PROCEDURE — 77030009851 HC PCH RTVR ENDOSC AMR -B: Performed by: SURGERY

## 2021-11-09 PROCEDURE — 77030026438 HC STYL ET INTUB CARD -A: Performed by: ANESTHESIOLOGY

## 2021-11-09 PROCEDURE — 74011250636 HC RX REV CODE- 250/636: Performed by: SURGERY

## 2021-11-09 PROCEDURE — 77030031139 HC SUT VCRL2 J&J -A: Performed by: SURGERY

## 2021-11-09 PROCEDURE — 76060000035 HC ANESTHESIA 2 TO 2.5 HR: Performed by: SURGERY

## 2021-11-09 PROCEDURE — 74011250636 HC RX REV CODE- 250/636: Performed by: NURSE ANESTHETIST, CERTIFIED REGISTERED

## 2021-11-09 PROCEDURE — 74011000250 HC RX REV CODE- 250: Performed by: NURSE ANESTHETIST, CERTIFIED REGISTERED

## 2021-11-09 PROCEDURE — 74011250636 HC RX REV CODE- 250/636: Performed by: ANESTHESIOLOGY

## 2021-11-09 PROCEDURE — 65270000029 HC RM PRIVATE

## 2021-11-09 PROCEDURE — 74011000250 HC RX REV CODE- 250: Performed by: SURGERY

## 2021-11-09 PROCEDURE — 77030002996 HC SUT SLK J&J -A: Performed by: SURGERY

## 2021-11-09 PROCEDURE — 77030036732 HC RELD STPLR VASC J&J -F: Performed by: SURGERY

## 2021-11-09 PROCEDURE — 77030008683 HC TU ET CUF COVD -A: Performed by: ANESTHESIOLOGY

## 2021-11-09 PROCEDURE — 00797 ANES IPER UPR ABD GSTR PX MO: CPT | Performed by: NURSE ANESTHETIST, CERTIFIED REGISTERED

## 2021-11-09 PROCEDURE — 77030010031 HC SCIS ENDOSC MPLR J&J -C: Performed by: SURGERY

## 2021-11-09 PROCEDURE — 74011000258 HC RX REV CODE- 258: Performed by: SURGERY

## 2021-11-09 PROCEDURE — 77030013079 HC BLNKT BAIR HGGR 3M -A: Performed by: ANESTHESIOLOGY

## 2021-11-09 PROCEDURE — 74011000258 HC RX REV CODE- 258: Performed by: NURSE ANESTHETIST, CERTIFIED REGISTERED

## 2021-11-09 PROCEDURE — 0D164ZA BYPASS STOMACH TO JEJUNUM, PERCUTANEOUS ENDOSCOPIC APPROACH: ICD-10-PCS | Performed by: SURGERY

## 2021-11-09 PROCEDURE — 77030027876 HC STPLR ENDOSC FLX PWR J&J -G1: Performed by: SURGERY

## 2021-11-09 PROCEDURE — 77030040361 HC SLV COMPR DVT MDII -B: Performed by: SURGERY

## 2021-11-09 PROCEDURE — 77030008598 HC TRCR ENDOSC BLDLS J&J -B: Performed by: SURGERY

## 2021-11-09 PROCEDURE — 77030018836 HC SOL IRR NACL ICUM -A: Performed by: SURGERY

## 2021-11-09 PROCEDURE — 77030009932 HC PRB FT CTRL J&J -B: Performed by: SURGERY

## 2021-11-09 PROCEDURE — 77030008756 HC TU IRR SUC STRY -B: Performed by: SURGERY

## 2021-11-09 PROCEDURE — 77030008603 HC TRCR ENDOSC EPATH J&J -C: Performed by: SURGERY

## 2021-11-09 PROCEDURE — 2709999900 HC NON-CHARGEABLE SUPPLY: Performed by: SURGERY

## 2021-11-09 PROCEDURE — 77030009968 HC RELD STPLR ENDOSC J&J -D: Performed by: SURGERY

## 2021-11-09 PROCEDURE — 74011250637 HC RX REV CODE- 250/637: Performed by: SURGERY

## 2021-11-09 PROCEDURE — 76010000131 HC OR TIME 2 TO 2.5 HR: Performed by: SURGERY

## 2021-11-09 PROCEDURE — 77030008437 HC REINF STRP REINF SEMGD WLGO -C: Performed by: SURGERY

## 2021-11-09 PROCEDURE — 77030033639 HC SHR ENDO COAG HARM 36 J&J -E: Performed by: SURGERY

## 2021-11-09 PROCEDURE — 74011250637 HC RX REV CODE- 250/637: Performed by: NURSE ANESTHETIST, CERTIFIED REGISTERED

## 2021-11-09 PROCEDURE — 76210000000 HC OR PH I REC 2 TO 2.5 HR: Performed by: SURGERY

## 2021-11-09 PROCEDURE — 2709999900 HC NON-CHARGEABLE SUPPLY

## 2021-11-09 PROCEDURE — 00797 ANES IPER UPR ABD GSTR PX MO: CPT | Performed by: ANESTHESIOLOGY

## 2021-11-09 PROCEDURE — 77030011808 HC STPLR ENDOSCOPIC J&J -D: Performed by: SURGERY

## 2021-11-09 PROCEDURE — 81025 URINE PREGNANCY TEST: CPT

## 2021-11-09 PROCEDURE — 77030040922 HC BLNKT HYPOTHRM STRY -A: Performed by: SURGERY

## 2021-11-09 PROCEDURE — 77030002933 HC SUT MCRYL J&J -A: Performed by: SURGERY

## 2021-11-09 RX ORDER — PROPOFOL 10 MG/ML
INJECTION, EMULSION INTRAVENOUS AS NEEDED
Status: DISCONTINUED | OUTPATIENT
Start: 2021-11-09 | End: 2021-11-09 | Stop reason: HOSPADM

## 2021-11-09 RX ORDER — SODIUM CHLORIDE, SODIUM LACTATE, POTASSIUM CHLORIDE, CALCIUM CHLORIDE 600; 310; 30; 20 MG/100ML; MG/100ML; MG/100ML; MG/100ML
25 INJECTION, SOLUTION INTRAVENOUS CONTINUOUS
Status: DISCONTINUED | OUTPATIENT
Start: 2021-11-09 | End: 2021-11-09 | Stop reason: HOSPADM

## 2021-11-09 RX ORDER — ACETAMINOPHEN 650 MG/1
650 SUPPOSITORY RECTAL ONCE
Status: ACTIVE | OUTPATIENT
Start: 2021-11-09 | End: 2021-11-10

## 2021-11-09 RX ORDER — ALBUTEROL SULFATE 90 UG/1
2 AEROSOL, METERED RESPIRATORY (INHALATION)
Status: DISCONTINUED | OUTPATIENT
Start: 2021-11-09 | End: 2021-11-09

## 2021-11-09 RX ORDER — SODIUM CHLORIDE, SODIUM LACTATE, POTASSIUM CHLORIDE, CALCIUM CHLORIDE 600; 310; 30; 20 MG/100ML; MG/100ML; MG/100ML; MG/100ML
150 INJECTION, SOLUTION INTRAVENOUS CONTINUOUS
Status: DISCONTINUED | OUTPATIENT
Start: 2021-11-09 | End: 2021-11-10 | Stop reason: HOSPADM

## 2021-11-09 RX ORDER — FENTANYL CITRATE 50 UG/ML
50 INJECTION, SOLUTION INTRAMUSCULAR; INTRAVENOUS
Status: DISCONTINUED | OUTPATIENT
Start: 2021-11-09 | End: 2021-11-09 | Stop reason: HOSPADM

## 2021-11-09 RX ORDER — ONDANSETRON 2 MG/ML
4 INJECTION INTRAMUSCULAR; INTRAVENOUS ONCE
Status: COMPLETED | OUTPATIENT
Start: 2021-11-09 | End: 2021-11-09

## 2021-11-09 RX ORDER — OXYCODONE HYDROCHLORIDE 5 MG/1
5 TABLET ORAL
Status: DISCONTINUED | OUTPATIENT
Start: 2021-11-09 | End: 2021-11-10 | Stop reason: HOSPADM

## 2021-11-09 RX ORDER — HYDROMORPHONE HYDROCHLORIDE 2 MG/ML
0.25 INJECTION, SOLUTION INTRAMUSCULAR; INTRAVENOUS; SUBCUTANEOUS AS NEEDED
Status: DISCONTINUED | OUTPATIENT
Start: 2021-11-09 | End: 2021-11-09

## 2021-11-09 RX ORDER — DIPHENHYDRAMINE HYDROCHLORIDE 50 MG/ML
25 INJECTION, SOLUTION INTRAMUSCULAR; INTRAVENOUS
Status: DISCONTINUED | OUTPATIENT
Start: 2021-11-09 | End: 2021-11-10 | Stop reason: HOSPADM

## 2021-11-09 RX ORDER — ONDANSETRON 2 MG/ML
4 INJECTION INTRAMUSCULAR; INTRAVENOUS
Status: DISCONTINUED | OUTPATIENT
Start: 2021-11-09 | End: 2021-11-10 | Stop reason: HOSPADM

## 2021-11-09 RX ORDER — HYDROMORPHONE HYDROCHLORIDE 1 MG/ML
1 INJECTION, SOLUTION INTRAMUSCULAR; INTRAVENOUS; SUBCUTANEOUS
Status: DISCONTINUED | OUTPATIENT
Start: 2021-11-09 | End: 2021-11-10 | Stop reason: HOSPADM

## 2021-11-09 RX ORDER — FAMOTIDINE 20 MG/1
20 TABLET, FILM COATED ORAL ONCE
Status: DISCONTINUED | OUTPATIENT
Start: 2021-11-09 | End: 2021-11-09 | Stop reason: HOSPADM

## 2021-11-09 RX ORDER — SODIUM CHLORIDE 0.9 % (FLUSH) 0.9 %
5-40 SYRINGE (ML) INJECTION AS NEEDED
Status: DISCONTINUED | OUTPATIENT
Start: 2021-11-09 | End: 2021-11-09 | Stop reason: HOSPADM

## 2021-11-09 RX ORDER — NEOSTIGMINE METHYLSULFATE 1 MG/ML
INJECTION, SOLUTION INTRAVENOUS AS NEEDED
Status: DISCONTINUED | OUTPATIENT
Start: 2021-11-09 | End: 2021-11-09 | Stop reason: HOSPADM

## 2021-11-09 RX ORDER — LIDOCAINE HYDROCHLORIDE 10 MG/ML
0.1 INJECTION, SOLUTION EPIDURAL; INFILTRATION; INTRACAUDAL; PERINEURAL AS NEEDED
Status: DISCONTINUED | OUTPATIENT
Start: 2021-11-09 | End: 2021-11-09 | Stop reason: HOSPADM

## 2021-11-09 RX ORDER — SODIUM CHLORIDE 9 MG/ML
INJECTION, SOLUTION INTRAVENOUS
Status: COMPLETED | OUTPATIENT
Start: 2021-11-09 | End: 2021-11-09

## 2021-11-09 RX ORDER — ACETAMINOPHEN 325 MG/1
650 TABLET ORAL EVERY 6 HOURS
Status: DISCONTINUED | OUTPATIENT
Start: 2021-11-09 | End: 2021-11-10 | Stop reason: HOSPADM

## 2021-11-09 RX ORDER — DEXAMETHASONE SODIUM PHOSPHATE 4 MG/ML
INJECTION, SOLUTION INTRA-ARTICULAR; INTRALESIONAL; INTRAMUSCULAR; INTRAVENOUS; SOFT TISSUE AS NEEDED
Status: DISCONTINUED | OUTPATIENT
Start: 2021-11-09 | End: 2021-11-09 | Stop reason: HOSPADM

## 2021-11-09 RX ORDER — ROCURONIUM BROMIDE 10 MG/ML
INJECTION, SOLUTION INTRAVENOUS AS NEEDED
Status: DISCONTINUED | OUTPATIENT
Start: 2021-11-09 | End: 2021-11-09 | Stop reason: HOSPADM

## 2021-11-09 RX ORDER — NALOXONE HYDROCHLORIDE 0.4 MG/ML
0.4 INJECTION, SOLUTION INTRAMUSCULAR; INTRAVENOUS; SUBCUTANEOUS AS NEEDED
Status: DISCONTINUED | OUTPATIENT
Start: 2021-11-09 | End: 2021-11-10 | Stop reason: HOSPADM

## 2021-11-09 RX ORDER — PROMETHAZINE HYDROCHLORIDE 25 MG/ML
6.25 INJECTION, SOLUTION INTRAMUSCULAR; INTRAVENOUS ONCE
Status: COMPLETED | OUTPATIENT
Start: 2021-11-09 | End: 2021-11-09

## 2021-11-09 RX ORDER — HYDROMORPHONE HYDROCHLORIDE 2 MG/ML
0.25 INJECTION, SOLUTION INTRAMUSCULAR; INTRAVENOUS; SUBCUTANEOUS AS NEEDED
Status: DISCONTINUED | OUTPATIENT
Start: 2021-11-09 | End: 2021-11-09 | Stop reason: HOSPADM

## 2021-11-09 RX ORDER — LIDOCAINE HYDROCHLORIDE 20 MG/ML
INJECTION, SOLUTION EPIDURAL; INFILTRATION; INTRACAUDAL; PERINEURAL AS NEEDED
Status: DISCONTINUED | OUTPATIENT
Start: 2021-11-09 | End: 2021-11-09 | Stop reason: HOSPADM

## 2021-11-09 RX ORDER — ENOXAPARIN SODIUM 100 MG/ML
40 INJECTION SUBCUTANEOUS ONCE
Status: COMPLETED | OUTPATIENT
Start: 2021-11-09 | End: 2021-11-09

## 2021-11-09 RX ORDER — ALBUTEROL SULFATE 0.83 MG/ML
2.5 SOLUTION RESPIRATORY (INHALATION)
Status: DISCONTINUED | OUTPATIENT
Start: 2021-11-09 | End: 2021-11-10 | Stop reason: HOSPADM

## 2021-11-09 RX ORDER — SUCCINYLCHOLINE CHLORIDE 20 MG/ML
INJECTION INTRAMUSCULAR; INTRAVENOUS AS NEEDED
Status: DISCONTINUED | OUTPATIENT
Start: 2021-11-09 | End: 2021-11-09 | Stop reason: HOSPADM

## 2021-11-09 RX ORDER — ONDANSETRON 2 MG/ML
INJECTION INTRAMUSCULAR; INTRAVENOUS AS NEEDED
Status: DISCONTINUED | OUTPATIENT
Start: 2021-11-09 | End: 2021-11-09 | Stop reason: HOSPADM

## 2021-11-09 RX ORDER — HYOSCYAMINE SULFATE 0.12 MG/1
0.12 TABLET SUBLINGUAL
Status: DISCONTINUED | OUTPATIENT
Start: 2021-11-09 | End: 2021-11-10 | Stop reason: HOSPADM

## 2021-11-09 RX ORDER — SODIUM CHLORIDE 0.9 % (FLUSH) 0.9 %
5-40 SYRINGE (ML) INJECTION EVERY 8 HOURS
Status: DISCONTINUED | OUTPATIENT
Start: 2021-11-09 | End: 2021-11-09 | Stop reason: HOSPADM

## 2021-11-09 RX ORDER — KETOROLAC TROMETHAMINE 15 MG/ML
15 INJECTION, SOLUTION INTRAMUSCULAR; INTRAVENOUS
Status: DISCONTINUED | OUTPATIENT
Start: 2021-11-09 | End: 2021-11-10 | Stop reason: HOSPADM

## 2021-11-09 RX ORDER — FAMOTIDINE 20 MG/1
20 TABLET, FILM COATED ORAL ONCE
Status: DISCONTINUED | OUTPATIENT
Start: 2021-11-09 | End: 2021-11-09

## 2021-11-09 RX ORDER — ACETAMINOPHEN 650 MG/1
SUPPOSITORY RECTAL AS NEEDED
Status: DISCONTINUED | OUTPATIENT
Start: 2021-11-09 | End: 2021-11-09 | Stop reason: HOSPADM

## 2021-11-09 RX ORDER — GLYCOPYRROLATE 0.2 MG/ML
INJECTION INTRAMUSCULAR; INTRAVENOUS AS NEEDED
Status: DISCONTINUED | OUTPATIENT
Start: 2021-11-09 | End: 2021-11-09 | Stop reason: HOSPADM

## 2021-11-09 RX ORDER — FENTANYL CITRATE 50 UG/ML
INJECTION, SOLUTION INTRAMUSCULAR; INTRAVENOUS AS NEEDED
Status: DISCONTINUED | OUTPATIENT
Start: 2021-11-09 | End: 2021-11-09 | Stop reason: HOSPADM

## 2021-11-09 RX ORDER — MIDAZOLAM HYDROCHLORIDE 1 MG/ML
INJECTION, SOLUTION INTRAMUSCULAR; INTRAVENOUS AS NEEDED
Status: DISCONTINUED | OUTPATIENT
Start: 2021-11-09 | End: 2021-11-09 | Stop reason: HOSPADM

## 2021-11-09 RX ORDER — SCOLOPAMINE TRANSDERMAL SYSTEM 1 MG/1
1 PATCH, EXTENDED RELEASE TRANSDERMAL
Status: DISCONTINUED | OUTPATIENT
Start: 2021-11-09 | End: 2021-11-10 | Stop reason: HOSPADM

## 2021-11-09 RX ORDER — SODIUM CHLORIDE, SODIUM LACTATE, POTASSIUM CHLORIDE, CALCIUM CHLORIDE 600; 310; 30; 20 MG/100ML; MG/100ML; MG/100ML; MG/100ML
150 INJECTION, SOLUTION INTRAVENOUS CONTINUOUS
Status: DISCONTINUED | OUTPATIENT
Start: 2021-11-09 | End: 2021-11-09 | Stop reason: HOSPADM

## 2021-11-09 RX ORDER — ENOXAPARIN SODIUM 100 MG/ML
40 INJECTION SUBCUTANEOUS EVERY 24 HOURS
Status: DISCONTINUED | OUTPATIENT
Start: 2021-11-10 | End: 2021-11-10 | Stop reason: HOSPADM

## 2021-11-09 RX ADMIN — ROCURONIUM BROMIDE 10 MG: 50 INJECTION INTRAVENOUS at 10:46

## 2021-11-09 RX ADMIN — DEXMEDETOMIDINE HYDROCHLORIDE 4 MCG: 100 INJECTION, SOLUTION, CONCENTRATE INTRAVENOUS at 10:30

## 2021-11-09 RX ADMIN — FENTANYL CITRATE 25 MCG: 50 INJECTION, SOLUTION INTRAMUSCULAR; INTRAVENOUS at 10:08

## 2021-11-09 RX ADMIN — MIDAZOLAM HYDROCHLORIDE 2 MG: 2 INJECTION, SOLUTION INTRAMUSCULAR; INTRAVENOUS at 09:30

## 2021-11-09 RX ADMIN — HYOSCYAMINE SULFATE 0.12 MG: 0.12 TABLET ORAL; SUBLINGUAL at 15:06

## 2021-11-09 RX ADMIN — HYDROMORPHONE HYDROCHLORIDE 0.25 MG: 2 INJECTION, SOLUTION INTRAMUSCULAR; INTRAVENOUS; SUBCUTANEOUS at 13:05

## 2021-11-09 RX ADMIN — Medication 3 MG: at 11:19

## 2021-11-09 RX ADMIN — SODIUM CHLORIDE, SODIUM LACTATE, POTASSIUM CHLORIDE, AND CALCIUM CHLORIDE: 600; 310; 30; 20 INJECTION, SOLUTION INTRAVENOUS at 10:36

## 2021-11-09 RX ADMIN — ROCURONIUM BROMIDE 50 MG: 50 INJECTION INTRAVENOUS at 09:49

## 2021-11-09 RX ADMIN — ONDANSETRON 4 MG: 2 INJECTION INTRAMUSCULAR; INTRAVENOUS at 11:21

## 2021-11-09 RX ADMIN — PROMETHAZINE HYDROCHLORIDE 6.25 MG: 25 INJECTION INTRAMUSCULAR; INTRAVENOUS at 13:06

## 2021-11-09 RX ADMIN — DEXMEDETOMIDINE HYDROCHLORIDE 4 MCG: 100 INJECTION, SOLUTION, CONCENTRATE INTRAVENOUS at 10:03

## 2021-11-09 RX ADMIN — FENTANYL CITRATE 50 MCG: 50 INJECTION, SOLUTION INTRAMUSCULAR; INTRAVENOUS at 10:01

## 2021-11-09 RX ADMIN — GLYCOPYRROLATE 0.4 MG: 0.2 INJECTION INTRAMUSCULAR; INTRAVENOUS at 11:19

## 2021-11-09 RX ADMIN — ONDANSETRON 4 MG: 2 INJECTION INTRAMUSCULAR; INTRAVENOUS at 17:52

## 2021-11-09 RX ADMIN — SODIUM CHLORIDE, SODIUM LACTATE, POTASSIUM CHLORIDE, AND CALCIUM CHLORIDE 25 ML/HR: 600; 310; 30; 20 INJECTION, SOLUTION INTRAVENOUS at 08:27

## 2021-11-09 RX ADMIN — FENTANYL CITRATE 100 MCG: 50 INJECTION, SOLUTION INTRAMUSCULAR; INTRAVENOUS at 09:40

## 2021-11-09 RX ADMIN — CEFAZOLIN SODIUM 3 G: 1 INJECTION, POWDER, FOR SOLUTION INTRAMUSCULAR; INTRAVENOUS at 09:50

## 2021-11-09 RX ADMIN — FENTANYL CITRATE 50 MCG: 50 INJECTION, SOLUTION INTRAMUSCULAR; INTRAVENOUS at 09:52

## 2021-11-09 RX ADMIN — ENOXAPARIN SODIUM 40 MG: 100 INJECTION SUBCUTANEOUS at 08:38

## 2021-11-09 RX ADMIN — DEXAMETHASONE SODIUM PHOSPHATE 4 MG: 4 INJECTION, SOLUTION INTRAMUSCULAR; INTRAVENOUS at 09:50

## 2021-11-09 RX ADMIN — KETOROLAC TROMETHAMINE 15 MG: 15 INJECTION, SOLUTION INTRAMUSCULAR; INTRAVENOUS at 15:06

## 2021-11-09 RX ADMIN — SODIUM CHLORIDE, SODIUM LACTATE, POTASSIUM CHLORIDE, AND CALCIUM CHLORIDE 150 ML/HR: 600; 310; 30; 20 INJECTION, SOLUTION INTRAVENOUS at 15:06

## 2021-11-09 RX ADMIN — FENTANYL CITRATE 25 MCG: 50 INJECTION, SOLUTION INTRAMUSCULAR; INTRAVENOUS at 11:23

## 2021-11-09 RX ADMIN — PROPOFOL 100 MG: 10 INJECTION, EMULSION INTRAVENOUS at 09:41

## 2021-11-09 RX ADMIN — LIDOCAINE HYDROCHLORIDE 50 MG: 20 INJECTION, SOLUTION EPIDURAL; INFILTRATION; INTRACAUDAL; PERINEURAL at 09:40

## 2021-11-09 RX ADMIN — ACETAMINOPHEN 650 MG: 325 TABLET ORAL at 21:27

## 2021-11-09 RX ADMIN — PROPOFOL 200 MG: 10 INJECTION, EMULSION INTRAVENOUS at 09:40

## 2021-11-09 RX ADMIN — FAMOTIDINE 20 MG: 10 INJECTION INTRAVENOUS at 08:38

## 2021-11-09 RX ADMIN — SUCCINYLCHOLINE CHLORIDE 180 MG: 20 INJECTION, SOLUTION INTRAMUSCULAR; INTRAVENOUS at 09:42

## 2021-11-09 RX ADMIN — ONDANSETRON 4 MG: 2 INJECTION INTRAMUSCULAR; INTRAVENOUS at 12:20

## 2021-11-09 RX ADMIN — SODIUM CHLORIDE, SODIUM LACTATE, POTASSIUM CHLORIDE, AND CALCIUM CHLORIDE 150 ML/HR: 600; 310; 30; 20 INJECTION, SOLUTION INTRAVENOUS at 21:56

## 2021-11-09 NOTE — H&P
Office Visit    6/24/2021  [facility]   Elvira Lantigua DO    General Surgery  BMI 50.0-59.9, adult [facility])    Dx  Morbid Obesity ; Referred by MARU Mack    Reason for Visit       Progress Notes  Elvira Lantigua DO (Physician) [facility] Donahue General Surgery     Bariatric Follow-Up Evaluation     Estephania Myers is a 43 y.o. white female having previously been evaluated in this office for consideration of surgical options of L4 definitive treatment of her super obesity. The patient in the past has vacillated between sleeve gastrectomy and gastric bypass as to her procedure of choice. She presents today noting that she wishes to pursue laparoscopic initially open Nandini-en-Y gastric bypass to achieve definitive durable weight loss on a personal level expected resolution of obesity related comorbidities. Patient is completed all multidisciplinary programmatic bariatric surgical requirements necessary prior to pursuit of surgery and presents without new medical or surgical history for surgical scheduling     Weight Loss Metrics 6/24/2021 6/24/2021 6/17/2021 6/17/2021 4/15/2021 1/21/2021 9/24/2020   Pre op / Initial Wt 330 - 326 - - - 332   Today's Wt - 330 lb - 326 lb 334 lb 332 lb -   BMI - 58.46 kg/m2 - 57.75 kg/m2 59.17 kg/m2 58.81 kg/m2 -   Ideal Body Wt 131 - 128 - - - 128   Excess Body Wt 199 - 198 - - - 204   Goal Wt 171 - 168 - - - 169   Wt loss to date 0 - 0 - - - 0   % Wt Loss 0 - 0 - - - 0   80% .2 - 158.4 - - - 163. 2               Allergies   Allergen Reactions    Ativan [Lorazepam] Other (comments)       Headaches     Other Plant, Animal, Environmental Other (comments)                Current Outpatient Medications on File Prior to Visit   Medication Sig Dispense Refill    FLUoxetine (PROzac) 10 mg capsule Take  by mouth daily.        ARIPiprazole (Abilify) 10 mg tablet Take 10 mg by mouth daily.        gabapentin (NEURONTIN) 300 mg capsule Take 1 PO TID      VENTOLIN HFA 90 mcg/actuation inhaler INHALE 1 PUFF BY MOUTH EVERY 4 HOURS AS NEEDED FOR WHEEZING 18 g 2    lisinopril (PRINIVIL, ZESTRIL) 20 mg tablet TAKE 1 TABLET BY MOUTH EVERY DAY 90 Tab 0    montelukast (SINGULAIR) 10 mg tablet TAKE 1 TABLET BY MOUTH DAILY 30 Tab 4    chlorthalidone (HYGROTEN) 25 mg tablet Take 1 Tab by mouth daily. 90 Tab 2      No current facility-administered medications on file prior to visit.              Past Medical History:   Diagnosis Date    Anxiety      Asthma      Depression      Hypertension      Musculoskeletal disorder       ruputre disk               Past Surgical History:   Procedure Laterality Date    HX DILATION AND EVACUATION   03/22/2019    HX OTHER SURGICAL         Bladder surger 1990    HX WISDOM TEETH EXTRACTION             Social History            Tobacco Use    Smoking status: Never Smoker    Smokeless tobacco: Never Used   Substance Use Topics    Alcohol use: Yes       Alcohol/week: 1.0 standard drinks       Types: 1 Cans of beer per week       Comment: socially    Drug use:  No               Family History   Problem Relation Age of Onset    Hypertension Mother      Cancer Mother      Hypertension Father      Other Brother           Born with heart murmur    Cancer Maternal Grandmother           Breast    Diabetes Maternal Grandfather           ROS:  General: Negative for fevers, chills, night sweats, fatigue, weight loss, or weight gain.     GI: Negative for abdominal pain, change in bowel habits, hematochezia, melena, or GERD.     Integumentary: Negative for dermatitis or abnormal moles.     HEENT: Negative for visual changes, vertigo, epistaxis, dysphagia, or hoarseness     Cardiac: Negative for chest pain, palpitations, hypertension, edema, or varicosities     Resp: Negative for cough, shortness of breath, wheezing, hemoptysis, snoring, or reactive airway disease     : Negative for hematuria, dysuria, frequency, urgency, nocturia, or stress urinary incontinence     MSK:  Negative for weakness, joint pain, or arthritis     Endocrine: Negative for diabetes, thyroid disease, polyuria, polydipsia, polyphagia, poor wound, heat intolerance, or cold intolerance.     Lymph/Heme: Negative for anemia, bruising, or history of blood transfusions.     Neuro:  Negative for dizziness, headache, fainting, seizures, and stroke.     Psychiatry:  Negative for anxiety or depression     Physical Exam     Visit Vitals  /83 (BP 1 Location: Left upper arm, BP Patient Position: At rest, BP Cuff Size: Adult)   Pulse 68   Temp 98.6 °F (37 °C) (Oral)   Resp 20   Ht 5' 3\" (1.6 m)   Wt 149.7 kg (330 lb)   BMI 58.46 kg/m²         Nursing note reviewed.      General: 43 y.o. female is in no acute distress. Head: Normocephalic, atraumatic  Resp: Clear to auscultation bilaterally, no wheezing, rhonchi, or rales, excursions normal and symmetrical.  Cardio: Regular rate and rhythm, no murmurs, clicks, gallops, or rubs. No edema or varicosities. Abdomen: Obese, soft, nontender, nondistended, normoactive bowel sounds, no hernias, no hepatosplenomegaly, al.  Psych: Alert and oriented to person, place, and time.     Impression  Super obesity with body mass index of 59 with obesity related comorbidities of hypertension, clinical obstructive sleep apnea, plantar fasciitis and weight related arthropathy        Plan     Risk benefits of operating versus options alternatives complications up to including death were again discussed with the patient. The patient has elected to pursue laparoscopic potentially open Nandini-en-Y gastric bypass to achieve definitive durable weight loss on a personal level expected resolution of obesity related comorbidities. We will proceed with surgical scheduling       The above history and physical examination was reviewed. There is been no interval medical or surgical history.   The proposed laparoscopic potentially open Nandini-en-Y gastric bypass to achieve weight loss on a personal level expected resolution of obesity related comorbidities was reviewed. The risk benefits of operating versus not potential tenderness potential complications up to including death were discussed. The patient voiced understanding of the discussion and wishes to proceed.     Sage Piedra DO, FACS

## 2021-11-09 NOTE — PROGRESS NOTES
Albuterol nebulizer solution was therapeutically interchanged for albuterol inhaler per the P&T Committee approved Therapeutic Interchanges Policy.     COLIN Grady [facility], Pharmacist  11/9/2021 2:52 PM

## 2021-11-09 NOTE — PROGRESS NOTES
conducted an initial consultation and Spiritual Assessment for [facility], who is a 43 y.o.,female. Patients Primary Language is: [place]. According to the patients EMR Nondenominational Affiliation is: No preference. The reason the Patient came to the hospital is:   Patient Active Problem List    Diagnosis Date Noted    Morbid obesity with BMI of 50.0-59.9, adult ([address] 11/09/2021    Depression, major, single episode, severe ([address] 04/22/2019    Obesity, morbid ([address] 02/02/2018    Chest wall pain 07/20/2017    Chronic bilateral low back pain without sciatica 07/30/2016    Essential hypertension 01/16/2016    Asthma 01/16/2016    Anxiety 01/16/2016        The  provided the following Interventions:  Initiated a relationship of care and support. Listened empathically. Provided information about Spiritual Care Services. Offered prayer and assurance of continued prayers on patient's behalf. Chart reviewed. The following outcomes where achieved:  Patient shared limited information about both their medical narrative and spiritual journey/beliefs. Patient processed feeling about current hospitalization. Patient expressed gratitude for 's visit. Assessment:  Patient does not have any Scientologist/cultural needs that will affect patients preferences in health care. There are no spiritual or Scientologist issues which require intervention at this time. Plan:  Chaplains will continue to follow and will provide pastoral care on an as needed/requested basis.  recommends bedside caregivers page  on duty if patient shows signs of acute spiritual or emotional distress.       [address] [facility]   [phone]

## 2021-11-09 NOTE — PERIOP NOTES
Assumed care of pt from OR via bed. Attached to monitor. VSS. OR,MAR and anesthesia report appreciated. Will monitor.

## 2021-11-09 NOTE — ROUTINE PROCESS
TRANSFER - IN REPORT:    Verbal report received from Jonnie (name) on [address]  being received from PACU(unit) for routine post - op      Report consisted of patients Situation, Background, Assessment and   Recommendations(SBAR). Information from the following report(s) SBAR, Kardex, OR Summary, Intake/Output, MAR and Recent Results was reviewed with the receiving nurse. Opportunity for questions and clarification was provided. Assessment completed upon patients arrival to unit and care assumed.

## 2021-11-09 NOTE — ANESTHESIA PREPROCEDURE EVALUATION
Relevant Problems   RESPIRATORY SYSTEM   (+) Asthma      NEUROLOGY   (+) Depression, major, single episode, severe (HCC)      CARDIOVASCULAR   (+) Essential hypertension      ENDOCRINE   (+) Obesity, morbid ([address]       Anesthetic History     PONV          Review of Systems / Medical History  Patient summary reviewed and pertinent labs reviewed    Pulmonary            Asthma : well controlled       Neuro/Psych         Psychiatric history     Cardiovascular    Hypertension: well controlled                   GI/Hepatic/Renal  Within defined limits              Endo/Other        Morbid obesity     Other Findings              Physical Exam    Airway  Mallampati: II  TM Distance: 4 - 6 cm  Neck ROM: normal range of motion   Mouth opening: Normal     Cardiovascular               Dental         Pulmonary                 Abdominal  GI exam deferred       Other Findings            Anesthetic Plan    ASA: 3  Anesthesia type: general          Induction: Intravenous  Anesthetic plan and risks discussed with: Patient

## 2021-11-09 NOTE — BRIEF OP NOTE
Brief Postoperative Note    Patient: Juanito Boykin  YOB: 1985  MRN: [id number]    Date of Procedure: 11/9/2021     Pre-Op Diagnosis: Morbid obesity ([address] [E66.01]    Post-Op Diagnosis: Same as preoperative diagnosis.       Procedure(s):  LAPAROSCOPIC SORAYA-EN-Y GASTRIC BYPASS    Surgeon(s):  Valerie Tomas DO    Surgical Assistant: Surg Asst-1: Irish Crisostomo    Anesthesia: General     Estimated Blood Loss (mL): Minimal    Complications: None    Specimens: * No specimens in log *     Implants: * No implants in log *    Drains:   Orogastric Tube 11/09/21 (Active)       Findings: Normal intra-abdominal anatomy    Electronically Signed by Anna Arndt DO on 11/9/2021 at 11:44 AM

## 2021-11-09 NOTE — PROGRESS NOTES
Problem: Falls - Risk of  Goal: *Absence of Falls  Description: Document Karla Stewart Fall Risk and appropriate interventions in the flowsheet.   Outcome: Progressing Towards Goal  Note: Fall Risk Interventions:  Mobility Interventions: Communicate number of staff needed for ambulation/transfer         Medication Interventions: Teach patient to arise slowly                   Problem: Patient Education: Go to Patient Education Activity  Goal: Patient/Family Education  Outcome: Progressing Towards Goal

## 2021-11-10 VITALS
TEMPERATURE: 98.4 F | DIASTOLIC BLOOD PRESSURE: 91 MMHG | SYSTOLIC BLOOD PRESSURE: 162 MMHG | WEIGHT: 293 LBS | HEART RATE: 82 BPM | HEIGHT: 63 IN | RESPIRATION RATE: 18 BRPM | OXYGEN SATURATION: 94 % | BODY MASS INDEX: 51.91 KG/M2

## 2021-11-10 LAB
ANION GAP SERPL CALC-SCNC: 5 MMOL/L (ref 3–18)
BUN SERPL-MCNC: 9 MG/DL (ref 7–18)
BUN/CREAT SERPL: 15 (ref 12–20)
CALCIUM SERPL-MCNC: 8.7 MG/DL (ref 8.5–10.1)
CHLORIDE SERPL-SCNC: 104 MMOL/L (ref 100–111)
CO2 SERPL-SCNC: 25 MMOL/L (ref 21–32)
CREAT SERPL-MCNC: 0.59 MG/DL (ref 0.6–1.3)
ERYTHROCYTE [DISTWIDTH] IN BLOOD BY AUTOMATED COUNT: 13.9 % (ref 11.6–14.5)
GLUCOSE SERPL-MCNC: 120 MG/DL (ref 74–99)
HCT VFR BLD AUTO: 41.1 % (ref 35–45)
HGB BLD-MCNC: 13.2 G/DL (ref 12–16)
MAGNESIUM SERPL-MCNC: 1.9 MG/DL (ref 1.6–2.6)
MCH RBC QN AUTO: 27.7 PG (ref 24–34)
MCHC RBC AUTO-ENTMCNC: 32.1 G/DL (ref 31–37)
MCV RBC AUTO: 86.3 FL (ref 78–100)
PLATELET # BLD AUTO: 290 K/UL (ref 135–420)
PMV BLD AUTO: 10.7 FL (ref 9.2–11.8)
POTASSIUM SERPL-SCNC: 3.8 MMOL/L (ref 3.5–5.5)
RBC # BLD AUTO: 4.76 M/UL (ref 4.2–5.3)
SODIUM SERPL-SCNC: 134 MMOL/L (ref 136–145)
WBC # BLD AUTO: 11.7 K/UL (ref 4.6–13.2)

## 2021-11-10 PROCEDURE — 85027 COMPLETE CBC AUTOMATED: CPT

## 2021-11-10 PROCEDURE — 83735 ASSAY OF MAGNESIUM: CPT

## 2021-11-10 PROCEDURE — 74011250636 HC RX REV CODE- 250/636: Performed by: SURGERY

## 2021-11-10 PROCEDURE — 74011000250 HC RX REV CODE- 250: Performed by: SURGERY

## 2021-11-10 PROCEDURE — 74011250637 HC RX REV CODE- 250/637: Performed by: SURGERY

## 2021-11-10 PROCEDURE — C9113 INJ PANTOPRAZOLE SODIUM, VIA: HCPCS | Performed by: SURGERY

## 2021-11-10 PROCEDURE — 99024 POSTOP FOLLOW-UP VISIT: CPT | Performed by: NURSE PRACTITIONER

## 2021-11-10 PROCEDURE — 2709999900 HC NON-CHARGEABLE SUPPLY

## 2021-11-10 PROCEDURE — 36415 COLL VENOUS BLD VENIPUNCTURE: CPT

## 2021-11-10 PROCEDURE — 80048 BASIC METABOLIC PNL TOTAL CA: CPT

## 2021-11-10 RX ORDER — OXYCODONE HYDROCHLORIDE 5 MG/1
5 TABLET ORAL
Qty: 10 TABLET | Refills: 0 | Status: SHIPPED | OUTPATIENT
Start: 2021-11-10 | End: 2021-11-13

## 2021-11-10 RX ORDER — ONDANSETRON 4 MG/1
4 TABLET, ORALLY DISINTEGRATING ORAL
Qty: 12 TABLET | Refills: 0 | Status: SHIPPED | OUTPATIENT
Start: 2021-11-10 | End: 2022-02-03

## 2021-11-10 RX ORDER — ENOXAPARIN SODIUM 100 MG/ML
40 INJECTION SUBCUTANEOUS DAILY
Qty: 7 EACH | Refills: 0 | Status: SHIPPED | OUTPATIENT
Start: 2021-11-10 | End: 2022-02-03

## 2021-11-10 RX ORDER — HYOSCYAMINE SULFATE 0.12 MG/1
0.12 TABLET SUBLINGUAL
Qty: 12 TABLET | Refills: 0 | Status: SHIPPED | OUTPATIENT
Start: 2021-11-10 | End: 2022-02-03

## 2021-11-10 RX ADMIN — ONDANSETRON 4 MG: 2 INJECTION INTRAMUSCULAR; INTRAVENOUS at 04:03

## 2021-11-10 RX ADMIN — HYOSCYAMINE SULFATE 0.12 MG: 0.12 TABLET ORAL; SUBLINGUAL at 08:10

## 2021-11-10 RX ADMIN — ENOXAPARIN SODIUM 40 MG: 100 INJECTION SUBCUTANEOUS at 08:09

## 2021-11-10 RX ADMIN — ACETAMINOPHEN 650 MG: 325 TABLET ORAL at 03:47

## 2021-11-10 RX ADMIN — ACETAMINOPHEN 650 MG: 325 TABLET ORAL at 15:31

## 2021-11-10 RX ADMIN — SODIUM CHLORIDE, SODIUM LACTATE, POTASSIUM CHLORIDE, AND CALCIUM CHLORIDE 150 ML/HR: 600; 310; 30; 20 INJECTION, SOLUTION INTRAVENOUS at 04:16

## 2021-11-10 RX ADMIN — SODIUM CHLORIDE 40 MG: 9 INJECTION, SOLUTION INTRAMUSCULAR; INTRAVENOUS; SUBCUTANEOUS at 08:09

## 2021-11-10 RX ADMIN — ACETAMINOPHEN 650 MG: 325 TABLET ORAL at 08:10

## 2021-11-10 NOTE — DISCHARGE SUMMARY
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 PROCEDURE PERFORMED:  Laparoscopic Nandini-en-Y gastric bypass utilizing a 15 mL separate tubulized gastric pouch based on the lesser curvature of the stomach, a 470 cm retrocolic/retrogastric Nandini limb, and a 40 cm biliopancreatic limb. Hospital Course:  Patient was admitted on 11/9/2021 for scheduled bariatric surgery. Operation was without significant complication. Patient admitted to the floor postoperatively, monitored as per protocol. Diet sequentially advanced beginning POD 1, pain medications transitioned to oral during the hospital course. Currently the patient is afebrile, vital signs stable, tolerating a clear liquid diet with protein supplementation, voiding spontaneously, ambulatory with adequate pain control with oral medications and clear surgical sites without evidence of infection. Discharge Diet:  Clear Liquid Bariatric Diet for 7 days, then soft moist protein diet for 5 weeks    Discharge Medications:  Discharge Medication List as of 11/10/2021  3:27 PM        START taking these medications    Details   enoxaparin (LOVENOX) 40 mg/0.4 mL 0.4 mL by SubCUTAneous route daily. , Normal, Disp-7 Each, R-0      oxyCODONE IR (ROXICODONE) 5 mg immediate release tablet Take 1 Tablet by mouth every six (6) hours as needed for Pain for up to 3 days.  Max Daily Amount: 20 mg., Normal, Disp-10 Tablet, R-0 ondansetron (ZOFRAN ODT) 4 mg disintegrating tablet Take 1 Tablet by mouth every eight (8) hours as needed for Nausea or Vomiting., Normal, Disp-12 Tablet, R-0      hyoscyamine SL (LEVSIN/SL) 0.125 mg SL tablet 1 Tablet by SubLINGual route every six (6) hours as needed for PRN Reason (Other) (Spasms). , Normal, Disp-12 Tablet, R-0           CONTINUE these medications which have NOT CHANGED    Details   ARIPiprazole (Abilify) 5 mg tablet Take 5 mg by mouth daily. , Historical Med      dextroamphetamine-amphetamine (AdderalL) 15 mg tablet Take 15 mg by mouth two (2) times a day., Historical Med      FLUoxetine (PROzac) 20 mg capsule Take 20 mg by mouth daily. , Historical Med      VENTOLIN HFA 90 mcg/actuation inhaler INHALE 1 PUFF BY MOUTH EVERY 4 HOURS AS NEEDED FOR WHEEZING, Normal, Disp-18 g,R-2      montelukast (SINGULAIR) 10 mg tablet TAKE 1 TABLET BY MOUTH DAILY, Normal, Disp-30 Tab,R-4           STOP taking these medications       lisinopril (PRINIVIL, ZESTRIL) 20 mg tablet Comments:   Reason for Stopping:                 Bariatric Chewable vitamins, 2 orally daily for life  Calcium Citrate 1500 mg orally daily for life  Vitamin B12 1000 micrograms sublingual daily for life  Vitamin D3 5,000 units orally daily for life   Vitamin B1 100 mg orally daily for life    Discharge disposition: home    Discharge condition: stable      Local wound care with daily showers, keep wounds clean and dry     Activity: as desired, no lifting, pushing, or pulling greater than 15lbs or situps for 30 days     Special Instructions:            - No driving until activity is not influenced by incisional pain and off narcotics            - No bath or hot tub until wounds are healed            - Check pulse and temperature twice daily for 10 days            - Notify [employer] for a Temp >100.5 or Pulse>115     Follow-up with your surgeon in 2 weeks, call office for appointment [phone] ([address] ) [phone]([facility])

## 2021-11-10 NOTE — PROGRESS NOTES
Surgery Progress Note    11/10/2021    Admit Date: 11/9/2021    Subjective:     Patient has complaints of pain and is controlled with current plan. Patient has been ambulating in halls. She reports nausea and no vomiting. Bowel Movements: None    Objective:     Blood pressure 121/80, pulse 89, temperature 98.8 °F (37.1 °C), resp. rate 16, height 5' 3\" (1.6 m), weight 147.4 kg (325 lb), SpO2 94 %. No intake/output data recorded.     11/08 1901 - 11/10 0700  In: 4050 [P.O.:180; I.V.:3870]  Out: 900 [Urine:850]    EXAM: GENERAL: alert, pleasant, no distress   HEART: regular rate and rhythm   LUNGS: clear to auscultation   ABDOMEN:  Soft, obese, appropriate incisional tenderness, +BS, non-distended, surgical incisions clean, dry, no erythema or drainage   EXTREMITIES: warm, well perfused    Data Review    Recent Results (from the past 24 hour(s))   CBC W/O DIFF    Collection Time: 11/10/21  1:31 AM   Result Value Ref Range    WBC 11.7 4.6 - 13.2 K/uL    RBC 4.76 4.20 - 5.30 M/uL    HGB 13.2 12.0 - 16.0 g/dL    HCT 41.1 35.0 - 45.0 %    MCV 86.3 78.0 - 100.0 FL    MCH 27.7 24.0 - 34.0 PG    MCHC 32.1 31.0 - 37.0 g/dL    RDW 13.9 11.6 - 14.5 %    PLATELET 231 953 - 697 K/uL    MPV 10.7 9.2 - 91.5 FL   METABOLIC PANEL, BASIC    Collection Time: 11/10/21  1:31 AM   Result Value Ref Range    Sodium 134 (L) 136 - 145 mmol/L    Potassium 3.8 3.5 - 5.5 mmol/L    Chloride 104 100 - 111 mmol/L    CO2 25 21 - 32 mmol/L    Anion gap 5 3.0 - 18 mmol/L    Glucose 120 (H) 74 - 99 mg/dL    BUN 9 7.0 - 18 MG/DL    Creatinine 0.59 (L) 0.6 - 1.3 MG/DL    BUN/Creatinine ratio 15 12 - 20      GFR est AA >60 >60 ml/min/1.73m2    GFR est non-AA >60 >60 ml/min/1.73m2    Calcium 8.7 8.5 - 10.1 MG/DL   MAGNESIUM    Collection Time: 11/10/21  1:31 AM   Result Value Ref Range    Magnesium 1.9 1.6 - 2.6 mg/dL       Assessment:   Chet Ortiz is a 43 y.o. female,  day 1 status post Laparoscopic Nandini-en-Y gastric bypass utilizing a 15 mL separate tubulized gastric pouch based on the lesser curvature of the stomach, a 633 cm retrocolic/retrogastric Nandini limb, and a 40 cm biliopancreatic limb.   Condition: good     Plan:   -Ambulate every four hours  -Pain managed prior to d/c   -Nausea managed prior to d/c   -Advance to Clear liquid Gastric Bypass Diet, if able to tolerate clear liquid diet (with pro shake) 4oz per hour for 3 hours prior to d/c    If patient continues to progress d/c home later today     Tom Newsome NP  8:14 AM  11/10/2021

## 2021-11-10 NOTE — OP NOTES
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 PROCEDURE PERFORMED:  Laparoscopic Nandini-en-Y gastric bypass utilizing a 15 mL separate tubulized gastric pouch based on the lesser curvature of the stomach, a 494 cm retrocolic/retrogastric Nandini limb, and a 40 cm biliopancreatic limb. SURGEON:  Jersey Umana. Leigh Ann Callahan DO.    FIRST ASSISTANT:  Irish Crisostomo SA.    ANESTHESIA:  General endotracheal supplemented at the conclusion of the operative procedure with local infiltration of the operative sites utilizing 266 mg of Exparel diluted in 50 mL of normal saline solution. COMPLICATIONS: None. SPECIMENS REMOVED: None. IMPLANTS: None. ESTIMATED BLOOD LOSS:  Less than 50 mL. INDICATIONS FOR SURGICAL PROCEDURE:  This is a 68-year-old white female who has failed medical management of her super obesity, and after investigating the surgical options, she has elected to pursue laparoscopic, potentially open Nandini-en-Y gastric bypass to achieve definitive durable weight loss on a personal level with expected resolution of obesity-related comorbidities. The risks and benefits of operative versus nonoperative potential alternatives and potential complications up to and including death were extensively discussed with the patient prior to the surgical procedure, who voiced her understanding and wished to proceed.     DESCRIPTION OF PROCEDURE:  The patient received Ancef for antibiotic prophylaxis and Lovenox for DVT prophylaxis in the preoperative staging area. After voiding and signing her operative permit, which was properly witnessed, she was then transported to the operating room, where she was placed in the supine position on the operating table and SCDs were placed and inflated prior to the induction of general endotracheal anesthesia. A 34-St Helenian orogastric tube was then placed atraumatically to gravity drainage for gastric decompression and utilization as a sizing template for construction of the gastric pouch. The abdomen was then prepped and draped in the usual sterile fashion. A time-out procedure was performed according to the protocol and agreed upon by all personnel in the operating suite. A transverse 12 mm cutaneous incision was made just superior to the umbilicus in the midline through which a 12 mm OptiView trocar and cannula were placed into the peritoneal cavity under direct vision utilizing a 10 mm 0-degree laparoscope. The laparoscope and trocar were removed. The abdomen continued to be insufflated with carbon dioxide gas, never exceeding a pressure of 15 mmHg, and a 30-degree 10 mm laparoscope was placed and utilized for the remainder of the procedure. The remaining ports were then placed under direct vision utilizing OptiView trocars and cannulas under direct vision. The second, a 5 mm incision in the left anterior axillary line two fingerbreadths below the left costal margin, the third a 12 mm incision midway between the left upper quadrant and the supraumbilical incision, a 12 mm incision located in the right paramedian location 8 cm from the midline midway between the costal margin and the level of the umbilicus. After brief exploration of the upper abdomen, which was unremarkable, the patient's transverse colon and omentum were reflected superiorly.   The ligament of Treitz was identified; 40 cm distal to he ligament of Treitz, the jejunum was transected with a triple-load stapling device, 60 mm in length, loaded with 2.5 mm staples. The mesentery was then divided down to its root utilizing the Harmonic scalpel. The Nandini limb was then measured to be 155 cm in length. At this point, on its antimesenteric border, an enterotomy was created with the Harmonic scalpel. A similar antimesenteric enterotomy was created 2 cm proximal to the staple line of the biliopancreatic limb and a stapled side-to-side functional end-to-side jejunojejunostomy was constructed utilizing a triple-load stapling device 60 mm in length, loaded with 2.5 mm staples, introducing one arm of the stapling device into each of the respective limbs prior to firing it on their antimesenteric borders. The remaining enteric defect was then closed with a running full-thickness 3-0 Vicryl suture and the mesenteric defect was closed with a running 2-0 silk suture. The ligament of Treitz was re-identified. Just anterior to the ligament of Treitz, a fenestration was created through the mesocolon into the lesser sac utilizing the Harmonic scalpel and the Nandini limb was then placed through this fenestration into the lesser sac. The omentum and transverse colon were reflected back to their normal anatomic positions. The rosas was identified along the lesser curvature of the stomach. Just inferior to the rosas along the greater curvature of the stomach, the omentum was  from the gastric wall utilizing the Harmonic scalpel until the Nandini limb was visualized within the lesser sac. A transverse 5 mm cutaneous incision was then made one-third the distance from the xiphisternal junction to the umbilicus in the midline through which a 5 mm trocar without a cannula was placed into the peritoneal cavity under direct vision.   This was removed and replaced with a 5 mm atraumatic grasping forceps, which was utilized in conjunction with a self-retaining retractor to elevate the left lobe of the liver and provide hiatal exposure. The  peritoneum overlying the angle of His was then opened with the Harmonic scalpel and 5 cm distal to the GE junction along the lesser curvature of stomach, the neurovascular elements of the lesser omentum were  from the gastric wall utilizing the Harmonic scalpel. Completion of this lesser curve fenestration into the lesser sac was accomplished with an esophageal retractor introduced posterior to the stomach through the omental fenestration along the greater curvature of the stomach. The 34-Belarusian orogastric tube was then retracted into the esophagus. A triple-load stapling device, 60 mm in length, loaded with 3.5 mm staples, was introduced into the lesser curve fenestration. It was oriented perpendicular to the lesser curve 5 cm distal to the GE junction prior to firing two-thirds the length of the staple load. The 34-Belarusian orogastric tube was then advanced and utilized as a sizing template for construction of the tubularized gastric pouch based on the lesser curvature of the stomach. The vertical aspect of the pouch was initiated with a triple-load stapling device, 60 mm in length, loaded with 3.5 mm staples, utilizing two-thirds the length of the staple load. The remainder of the pouch was constructed with sequential firings of a triple-load stapling device, 60 mm in length, loaded with 3.5 mm staples, ending the transection at the angle of His. It should be noted that the first full-length firing of the 3.5 mm stapling device utilized an Ethicon staple line reinforcement absorbable implant and this then created a 15 mL separate tubularized gastric pouch based on the lesser curvature of the stomach. The Nandini limb was then elevated posterior to the stomach in retrogastric position, where a tension-free hand-sewn two-layered gastrojejunostomy was constructed.   The geometric orientation of the gastrojejunostomy was at the distal aspect of the tubularized gastric pouch to the antimesenteric border of the Nandini limb 2 cm distal to the staple line. The posterior row of running seromuscular 3-0 Vicryl was placed. A transverse 12 mm gastrotomy was made at the distal aspect of the tubularized gastric pouch with an adjacent antimesenteric 12 mm Nandini limb enterotomy. The running inner layer of full-thickness 3-0 Vicryl suture was then began in the left lateral aspect of the anastomosis, running across the posterior aspect of the anastomosis, running around the right lateral aspect of the anastomosis to the anterior midline. A second full-thickness 3-0 Vicryl suture was initiated adjacent to the origin of the first and running around the left lateral aspect of the anastomosis to the anterior midline. The 34-Amharic orogastric tube was then advanced across the gastrojejunal anastomosis into the Nandini limb prior to tying with a running inner layer of full-thickness 3-0 Vicryl suture together anteriorly. The gastrojejunal anastomosis was then completed anteriorly utilizing a running seromuscular 3-0 Vicryl suture. The 34-Amharic orogastric tube was removed and after assuring there was adequate redundancy of the Nandini limb above the level of the mesocolon, the omentum and transverse colon were reflected superiorly. The space through which a Wright's hernia might occur was closed with a running 2-0 silk suture and the mesocolic defect was closed with a series of simple interrupted 2-0 silk sutures. The omentum and transverse colon were returned to their normal anatomic position. The Nandini limb was noted to be viable with adequate redundancy above the level of the mesocolon. There was no tension noted at the gastrojejunal anastomosis. The laparoscope was shifted to the left midabdominal port site to allow visualization of the supraumbilical facial trocar defect, which was closed with a suture passing device and #2 Vicryl suture.   All operative sites were inspected and noted to be hemostatic. The abdomen was then desufflated off carbon dioxide gas. Trocars were removed under direct vision. The wounds were  irrigated with normal saline solution and the fascial suture was tied. The skin was then approximated with a series of simple interrupted buried deep dermal 4-0 Monocryl sutures. Steri-Strips were applied as were sterile dressings. The sponge and needle counts were correct both during and at the completion of the procedure. The patient tolerated the procedure without operative complications, subsequently was extubated, and transported to the recovery room in awake, alert, and stable condition. The estimated blood was less than 50 mL. The patient received 1500 mL of crystalloid during the procedure. Operative start time was 0954 and completion time was 1130.         Sharon Nunez, DO      [id number]  D:  11/09/2021 15:24  T:  11/10/2021 6:00  JOB #:  [id number]

## 2021-11-10 NOTE — ROUTINE PROCESS
Patient was educated on all discharge instructions below. He/she understood and was provided a copy. He/she knows who to call for any issues post discharge. Hydration  Hydration is your NUMBER ONE priority. Dehydration is the most common reason for readmission to the hospital. Dehydration occurs when  your body does not get enough fluid to keep it functioning at its best. Your body also requires fluid  to burn its stored fat calories for energy. Carry a bottle of water with you all day, even when you are away from home; remind yourself to  drink even if you dont feel thirsty. Drinking 64 ounces of fluid is your daily goal. You can tell if  youre getting enough fluid if youre making clear, light-colored urine five to 10 times per day. Signs of dehydration can be thirst, headache, hard stools or dizziness upon sitting or standing up. You should contact your surgeons office if you are unable to drink enough fluid to stay hydrated. --     General Care after Surgery   No lifting over 15 pounds for four weeks.  No driving while taking the pain medication (about seven to 10 days).  No tub baths, swimming or hot tubs until incisions are healed (about two weeks).  You may shower. Clean incisions daily /gently with soap and check incisions for signs of infection:  -- Redness around incision. -- Swelling at site. -- Drainage with an foul odor (pus). -- Increase tenderness around incision.  Take your temperature and resting pulse in the morning and evening. Record on tracking form  given to you. Call if your temperature is greater than 101 or your pulse rate is greater than 115.  Please contact your surgeon if you are having excessive abdominal pain (that lasts longer than  four hours and does not improve with prescribed pain medication), vomiting or shortness of breath.  Get up and move -- do not sit in one place for more than an hour.  You need to WALK (EXERCISE) for 30 minutes per day.   -- Walking around your house does not count. -- Bike, treadmill and elliptical are OK. -- NO weight lifting or sit ups.  If constipated take an adult dose of Miralax (available over the counter). Contact the doctors  office if Miralax doesnt help.  You may swallow pills starting the day after surgery as long as they fit inside this [place]:   Continue to use your incentive spirometer (breathing machine) for the next couple of weeks to  help prevent pneumonia. Temperature/Heart Rate Log  Take your temperature and heart rate (pulse) twice a day for 14 days. Take both in the morning and  evening at about the same time each day (when you wake up and before you go to bed when you  are relaxed). Please contact your doctors office if your:   Temperature is higher than 101 degrees.  Heart rate (pulse) is higher than 115 beats per minute  (normal heart rate is 60 to 100 beats per minute). How to Take Your Heart Rate (Pulse)   Turn your left hand so that your palm is face-up.  With the index and middle fingers of your right  hand, draw a line from the base of your thumb to  just below the crease in your wrist. Your fingers  should nestle just to the left of the large tendon that  pops up when you bend your wrist toward you.  Dont press too hard, that will make the pulse go  away. Use gentle pressure.  Wait. It can take several seconds -- and several micro-adjustments in the placement of your two  fingers on your wrist -- to find your pulse. Just keep moving your fingers down or up your wrist  in small increments (and pausing for a few seconds) until you find it. How to Take Your Pulse Rate   Find a watch with a second hand and place it on your right wrist or on the table next  to your left hand.  After finding your pulse, count the number of beats for 20 seconds.  Multiply by three to get your heart rate, or beats per minute  (or just count for 60 seconds for a math-free option).    Normal, resting heart rate is about 60 to 100 beats per minute. -- 46 --  PATIENT GUIDE TO BARIATRIC SURGERY    Lovenox Self Injection Guide  Prepare  Step 1: Wash and dry your hands thoroughly. Step 2: Sit or lie in a comfortable position and choose an area  on the right or left side of the abdomen at least two inches away  from the belly button. Step 3: Clean the injection site with an alcohol swab and let dry. Inject  Step 4: Remove the needle cap by pulling it straight off the syringe and  discard it in a sharps . Step 5: With your other hand, pinch an inch of the cleansed area to  make a fold in the skin. Insert the full length of the needle straight  down -- at a 90° angle -- into the fold of skin. -- 50 --  PATIENT GUIDE TO BARIATRIC [address] [place] [address] Rd  Step 6: Press the plunger with your thumb until the syringe is empty. Then pull the needle straight out and release the skin fold. Dispose  Step 7: Point the needle down and away from yourself and others,  and then push down on the plunger to activate the safety shield. Step 8: Place the used syringe in the sharps . Do NOT expel the air bubble from the syringe before the injection. Administration should be alternated between the left and right abdominal wall. The whole length  of the needle should be introduced into a skin fold held between the thumb and forefinger; the  skin fold should be held throughout the injection. To minimize bruising, do not rub the injection  site after completion of the injection. Questions about LOVENOX? Call [phone]  -- 49 --  PATIENT GUIDE TO BARIATRIC SURGERY    9. DIET AND LIFESTYLE  Key Diet Principles Following Bariatric Surgery   Begin each meal with soft moist high protein foods (i.e. chicken, turkey, yogurt, tuna, eggs,  cottage cheese, other fish and seafood).  Consume a minimum of 64 ounces of fluid each day to prevent dehydration. No straws.  No food and fluid together. Stop drinking 30 minutes before a meal. You may begin fluids again  30 minutes after you finish a meal.   Eat very slowly and chew all foods completely.  Keep portions small.  No simple sugars or high fat foods. No carbonated beverages. No caffeine.  Eat three meals per day. No skipping. Avoid snacking between meals.  No alcohol. No smoking.  Two Flintstones Complete Chewable vitamins each day. Take one in the morning and one at night.  1,500 milligrams Calcium Citrate per day in separate dosages.  Vitamin D 3: 5,000 IU taken per day.  Vitamin B-12: Take 1,000 micrograms sublingually daily.  Iron: 60 milligrams per day from [facility] [employer].  Protein supplements of your choice. Must be low sugar (0 to 3 grams), low fat (0 to 3 grams) and  provide at least 35 to 40 grams of protein each day. You need 60 to 70 grams of protein (food  and supplements) each day.  Minimum of 30 minutes of physical activity daily. Do not damian NSAIDS . Do not take Steroids without your surgeons permission. -- 50 --  PATIENT GUIDE TO BARIATRIC SURGERY    Your Priorities After Surgery  ? Fluid: 64 ounces of fluid per day. ? Protein: 60 to 70 grams of protein per day. ? Walk every day. Clear Liquid Diet  One week of clear liquids: minimum of 64 ounces of fluid per day. Fluid:   Zero calorie liquids.  No caffeine.  No carbonation.  No sugary drinks.  No alcohol.  No straws. Food   Protein drinks.  Less than 3 grams of sugar and  3 grams of fat per serving.  Protein drink should provide you with  60 to 70 grams of protein. Soft Protein Diet  Five weeks of soft protein (1 ounce for soft protein, 3 ounces of yogurt/cottage cheese). Three meals per day and 1 protein shake. Protein shakes should provide you with 30 grams of  protein on the soft protein diet.   Slow transition:   First week on soft protein diet -- focus on yogurt, cottage cheese, eggs, vegetarian refried beans,  black beans, kidney beans and white beans. (NO BAKED BEANS.)   Second through fourth week on soft protein diet -- focus on yogurt, cottage cheese, eggs,  canned tuna, canned chicken, tilapia and fish (needs to be soft enough to be cut up with a fork).  Fifth week on soft protein diet -- focus on yogurt, cottage cheese, eggs, canned tuna, canned  chicken, tilapia, fish, salmon, chicken breast or turkey. Fluid is your #1 Priority! Continue clear liquids between meals. You will need 64 ounces of fluid per day. Fluids that you can have include:   Water.  Zero calorie liquids. You will need to sip throughout the day and should therefore have a water bottle with you at all  times! No liquids with your meals. Stop 30 minutes before a meal and wait 30 minutes after a meal.  No straws. Zero calorie liquids. No caffeine. No carbonation. No sugary drinks. No alcohol. -- 51 --  PATIENT GUIDE TO BARIATRIC SURGERY    Protein  You will need 60 to 70 grams of protein per day.  60 to 70 grams of protein shakes when on the clear liquid diet (two to three shakes per day).  30 to 50 grams of protein shakes when on the soft protein diet (one shake per day). Eat Three Times Per Day  You will need to eat three times per day. My planned times are:  _________________________________________________________  _________________________________________________________  _________________________________________________________  Nausea, Vomiting, Stomach Pain  If you have problems with nausea, vomiting or stomach pain, try:   Eating slowly: 20 to 30 minutes per meal.   Chewing food thoroughly: 20 to 30 chews before food is swallowed.  Small portions: measure portions in medicine cup.  Stopping before feeling full.  AVOIDING SUGAR and FRIED FOOD: sugar will cause dumping syndrome and lead to weight gain. Exercise  I will need to get a minimum of 30 minutes of exercise per day or 150 minutes of exercise per week.    Walking, swimming, biking or elliptical.   Find something you enjoy! Vitamins  After surgery, you will need to take the following vitamins for the rest of your life -- FOREVER.  Vitamin D 3: 5,000 IU per day.  Calcium Citrate: 1,500 milligrams, taken separately.  Flintstones Complete: two per day, taken separately.  Sublingual Vitamin B-12: 1,000 micrograms daily.  Iron for menstruating women or patients with a history of low iron: 65 milligrams daily. We recommend going to www[facility]. cdream network and purchasing iron from there. The lemon-lime has 60 milligrams. This iron is better absorbed than over-the-counter iron. -- 52 --  PATIENT GUIDE TO BARIATRIC SURGERY    Clear Liquid Log  Getting your fluid in is top priority during this week. Fluids (MINIUM of 64 ounces per day):  ? 8 oz. ? 8 oz. ? 8 oz. ? 8 oz. ? 8 oz. ? 8 oz. ? 8 oz. ? 8 oz. ? 8 oz. ? 8 oz. ? 8 oz. ? 8 oz. Flintstones Complete Chewable: ? a.m. ? p.m. Calcium Citrate (1,500 milligrams/day):  Pill form  ? Two crushed pills (morning) ? Two crushed pills (afternoon) ? Two crushed pills (evening)  OR Upcal D (powder)  ? One pack/scoop ? One pack/scoop ? One pack/scoop  OR Celebrate Chewable Vitamins (500 mg each) or B[facility] Ad[employer]age Chewables (500 mg)  ? One chewable (morning) ? One chewable (afternoon) ? One chewable (evening)  OR Liquid Calcium Citrate  ? 1 tbsp. Calcium Citrate ? 1 tbsp. Calcium Citrate ? 1 tbsp. Calcium Citrate  Vitamin D3: ? 5,000 IU daily. Vitamin B-12: ? 1,000 micrograms per day. Vitamin B 1 100mg daily  Iron (menstruating women or patients with a history of low iron):  ? 60 milligrams per day from [employer] [employer]. Protein drinks (protein drinks should be under 3 grams of sugar and 3 grams of fat). Protein shake (60 grams per day): ? a.m. ? p.m. Exercise: ? 30 to 40 minutes per day.   -- 53 --  PATIENT GUIDE TO BARIATRIC SURGERY    Bariatric Soft and Moist Diet Shopping List  Vitamin B 1 100 mg daily   alcium Citrate (1,500 milligrams/day):  Pill form  ? Two crushed pills (morning) ? Two crushed pills (afternoon) ? Two crushed pills (evening)  OR Upcal D (powder)  ? One pack/scoop ? One pack/scoop ? One pack/scoop  OR Celebrate Chewable Vitamins (500 mg each) or [facility] [employer] Chewables (500 mg)  ? One chewable (morning) ? One chewable (afternoon) ? One chewable (evening)  OR Liquid Calcium Citrate  ? 1 tbsp. Calcium Citrate ? 1 tbsp. Calcium Citrate ? 1 tbsp. Calcium Citrate  Vitamin D3: ? 5,000 IU daily  Vitamin B-12: ? 1,000 micrograms per day  Iron (menstruating women or  patients with a history of low iron):  ? 60 milligrams per day  from [facility] [employer]  Protein drinks (protein drinks should be under  3 grams of sugar and 3 grams of fat). Protein shake (30 to 40 grams per day):  ? a.m. ? p.m. Exercise: ? 30 to 40 minutes per  Educated on Diet Progression    [facility] G[facility] By[facility] Dietary Progression    Patient Name:   Date of Surgery: Ice Chips start once admitted on floor. Begin Bariatric Clear Liquid Diet on:     Clear Liquid Diet: 64 oz. of fluid per day  Low calorie, low sugar, non-carbonated beverages  Water, Crystal Light, Propel Water, Sugar Free Jell-O, Sugar Free Popsicles, Bouillon  Start protein supplement during this stage. (60-70 grams per day)  Getting your fluid in and staying hydrated is your #1 priority! The clear liquid diet will last for 7 days. Begin Bariatric Soft and Moist on: This stage of the diet will last for 5 weeks, unless otherwise instructed by your surgeon. Begin:  1 week post-op   End:  6 weeks post-op (or when you follow up with the Registered Dietitian)    Soft, moist, high protein foods: 3 meals per day plus protein supplements. Portions should emphasize on soft protein. Portions will be a MAXIMUM of:   1 ounce of solid food   2-3 ounces of cottage cheese and yogurt.   Protein supplements should be between meals and provide 30-40 grams per day during soft protein diet. Continue to get 64 ounces of fluid in per day. Protein foods that are ok on the Soft and Moist Diet include:  Slow transition:  1st week on soft protein should focus on: Yogurt, cottage cheese, eggs  2nd -4th  week on soft protein diet should focus on: yogurt, cottage cheese, eggs, canned tuna, canned chicken, tilapia, fish (needs to be soft enough to be cut up with a fork)  5th week on soft protein diet should focus on: Yogurt, cottage cheese, eggs, canned tuna, canned chicken, tilapia, fish, salmon, chicken breast, or turkey. Remember to continue to get 64 ounces of fluid daily on ALL Stages. To be advanced to Bariatric Maintenance Stage of the bariatric diet, follow up with the dietitian 6 weeks post-op, around: For any additional questions, please refer to your blue binder that was provided to you at the start of the bariatric program.   Pt given a hard copy of vitamin list and dietary progression sheet.

## 2021-11-10 NOTE — PROGRESS NOTES
Discharge order noted for today. Orders reviewed. No needs identified at this time.  remains available if needed.   Bebe Mccarthy RN - Outcomes Manager  [phone]

## 2021-11-10 NOTE — PROGRESS NOTES
Patient was educated on lovenox injections. 1. Wash and dry hands  2. Sit or lie in comfortable position  3. Choose an area around love handles 3 inches away from [facility]  4. Clean site with alcohol and let dry  5. Remove needle cap pull straight out  6. With your other and pinch an inch of the cleanses area and insert full length of needle straight in 90 degree  7. Press the plunger with your thumb slowly until syringe is empty. Pull needle out and point away from you. Push down on plunger to activate the safety shield. Push hard  8. Place the syringe in a plastic bottle and dispose in trash. 9. Rotate sites  10. Do not remove air from syringe before injection  11.  Do not rub the area

## 2021-11-10 NOTE — ANESTHESIA POSTPROCEDURE EVALUATION
Procedure(s):  LAPAROSCOPIC SORAYA-EN-Y GASTRIC BYPASS.     general    Anesthesia Post Evaluation      Multimodal analgesia: multimodal analgesia used between 6 hours prior to anesthesia start to PACU discharge  Patient location during evaluation: bedside  Patient participation: complete - patient participated  Level of consciousness: awake  Pain management: adequate  Airway patency: patent  Anesthetic complications: no  Cardiovascular status: stable  Respiratory status: acceptable  Hydration status: acceptable  Post anesthesia nausea and vomiting:  controlled      INITIAL Post-op Vital signs:   Vitals Value Taken Time   /90 11/09/21 1329   Temp 36.2 °C (97.1 °F) 11/09/21 1142   Pulse 79 11/09/21 1332   Resp 11 11/09/21 1332   SpO2 97 % 11/09/21 1332

## 2021-11-10 NOTE — ROUTINE PROCESS
Bedside verbal report given to Kendra Arce, Oncoming Nurse. Report consisted of patients Situation, Background, Assessment and   Recommendations(SBAR). Information from the following report(s): Kardex, MAR and Recent Results was reviewed with the oncoming nurse. Opportunity for questions and clarification was provided.

## 2021-11-10 NOTE — PROGRESS NOTES
Reason for Admission:  Morbid obesity ([facility] [address] [E66.01]  Morbid obesity with BMI of 50.0-59.9, adult ([facility] [address] [E66.01, Z68.43]                 RUR Score:    3%            Plan for utilizing home health:    no                      Likelihood of Readmission:   LOW                         Transition of Care Plan:              Initial assessment completed with patient. Cognitive status of patient: oriented to time, place, person and situation. Face sheet information confirmed:  yes. The patient designates Reema to participate in her discharge plan and to receive any needed information. This patient lives in a single family home with . Patient is able to navigate steps as needed. Prior to hospitalization, patient was considered to be independent with ADLs/IADLS : yes . Patient has a current ACP document on file: no      Healthcare Decision Maker:   Primary Decision Maker: Dianah Sandhoff - Spouse - [phone]    Click here to complete [employer] including selection of the Healthcare Decision Maker Relationship (ie \"Primary\")    The  will be available to transport patient home upon discharge. The patient has no DME available in the home. Patient is not currently active with home health. Patient has not stayed in a skilled nursing facility or rehab. This patient is on dialysis :no    Currently, the discharge plan is Home. The patient states that she can obtain her medications from the pharmacy, and take her medications as directed. Patient's current insurance is [employer]. Care Management Interventions  PCP Verified by CM:  Yes  Mode of Transport at Discharge: Self  Transition of Care Consult (CM Consult): Discharge Planning  Support Systems: Spouse/Significant Other  Confirm Follow Up Transport: Family  Discharge Location  Discharge Placement: Home        Yaima Julien RN - Outcomes Manager  [phone]

## 2021-11-10 NOTE — ROUTINE PROCESS
Received report from [facility]. Pt asleep in the recliner, NAD, breathing non labored, on room air. IV site clean, dry and intact. IVF going per order. Abdominal lap sites w/ band aid. Family member at the bedside. Bed at the lowest level on lock position, call bell w/i reach. Bedside and Verbal shift change report given to CORBIN Weiss (oncoming nurse) by me (offgoing nurse). Report included the following information SBAR, Kardex, Procedure Summary, Intake/Output, MAR and Recent Results.

## 2021-11-18 ENCOUNTER — OFFICE VISIT (OUTPATIENT)
Dept: SURGERY | Age: 36
End: 2021-11-18
Payer: COMMERCIAL

## 2021-11-18 VITALS
HEART RATE: 79 BPM | HEIGHT: 63 IN | BODY MASS INDEX: 51.91 KG/M2 | TEMPERATURE: 96.4 F | RESPIRATION RATE: 20 BRPM | WEIGHT: 293 LBS | DIASTOLIC BLOOD PRESSURE: 73 MMHG | SYSTOLIC BLOOD PRESSURE: 124 MMHG

## 2021-11-18 DIAGNOSIS — K91.2 POSTOPERATIVE INTESTINAL MALABSORPTION: Primary | ICD-10-CM

## 2021-11-18 PROCEDURE — 99024 POSTOP FOLLOW-UP VISIT: CPT | Performed by: SURGERY

## 2021-11-18 RX ORDER — ERGOCALCIFEROL 1.25 MG/1
50000 CAPSULE ORAL
COMMUNITY

## 2021-11-18 RX ORDER — LANOLIN ALCOHOL/MO/W.PET/CERES
1000 CREAM (GRAM) TOPICAL DAILY
COMMUNITY

## 2021-11-18 RX ORDER — BISMUTH SUBSALICYLATE 262 MG
1 TABLET,CHEWABLE ORAL DAILY
COMMUNITY

## 2021-11-18 RX ORDER — CALCIUM CARBONATE/VITAMIN D3 600 MG-125
TABLET ORAL
COMMUNITY

## 2021-11-18 NOTE — PROGRESS NOTES
Bariatric Follow-Up Evaluation    Yesenia Villagran is a 43 y.o. white female status post laparoscopic gastric bypass November 9, 2021 who presents in follow-up note expected decrease incisional discomfort no longer requiring analgesics and otherwise without complaint. Compliant with intolerant of a early post gastric bypass diet meeting both protein and fluid goals. The patient notes appropriate early satiety with no specific food intolerances or pathologic emesis and has not experienced dumping syndrome and she has not attempted high-density carbohydrates  Compliant with multivitamin, calcium citrate, vitamin B1, vitamin B12 and vitamin D supplementation. Exercise regimen: Walking 30 minutes daily  Comorbidities: Hypertension, reactive airway disease, clinical obstructive sleep apnea-resolved                           Weight related arthropathy-improved  Preop weight: 322  Current weight: 314. BMI: 56  Extra weight loss: 153  Current weight loss: 8  Goal weight: 69  Percentage weight loss: 5% / 9 days    Weight Loss Metrics 11/18/2021 11/18/2021 11/9/2021 9/23/2021 9/23/2021 7/19/2021 7/8/2021   Pre op / Initial Wt 322 - - 322 - - -   Today's Wt - 314 lb 325 lb - 322 lb - 330 lb   BMI - 55.62 kg/m2 57.57 kg/m2 - 57.04 kg/m2 58.46 kg/m2 -   Ideal Body Wt 131 - - 131 - - -   Excess Body Wt 191 - - 191 - - -   Goal Wt 169 - - 169 - - -   Wt loss to date 8 - - 0 - - -   % Wt Loss 0.05 - - 0 - - -   80% .8 - - 152.8 - - -       Allergies   Allergen Reactions    Ativan [Lorazepam] Other (comments)     Headaches     Other Plant, Animal, Environmental Other (comments)       Current Outpatient Medications on File Prior to Visit   Medication Sig Dispense Refill    multivitamin (ONE A DAY) tablet Take 1 Tablet by mouth daily. Beri melts      calcium-cholecalciferol, d3, (CALCIUM 600 + D) 600-125 mg-unit tab Take  by mouth.       ergocalciferol (Vitamin D2) 1,250 mcg (50,000 unit) capsule Take 50,000 Units by mouth.      cyanocobalamin 1,000 mcg tablet Take 1,000 mcg by mouth daily.  ARIPiprazole (Abilify) 5 mg tablet Take 5 mg by mouth daily.  dextroamphetamine-amphetamine (AdderalL) 15 mg tablet Take 15 mg by mouth two (2) times a day.  FLUoxetine (PROzac) 20 mg capsule Take 20 mg by mouth daily.  VENTOLIN HFA 90 mcg/actuation inhaler INHALE 1 PUFF BY MOUTH EVERY 4 HOURS AS NEEDED FOR WHEEZING 18 g 2    montelukast (SINGULAIR) 10 mg tablet TAKE 1 TABLET BY MOUTH DAILY (Patient taking differently: Take 10 mg by mouth daily as needed.) 30 Tab 4    enoxaparin (LOVENOX) 40 mg/0.4 mL 0.4 mL by SubCUTAneous route daily. (Patient not taking: Reported on 11/18/2021) 7 Each 0    ondansetron (ZOFRAN ODT) 4 mg disintegrating tablet Take 1 Tablet by mouth every eight (8) hours as needed for Nausea or Vomiting. (Patient not taking: Reported on 11/18/2021) 12 Tablet 0    hyoscyamine SL (LEVSIN/SL) 0.125 mg SL tablet 1 Tablet by SubLINGual route every six (6) hours as needed for PRN Reason (Other) (Spasms). (Patient not taking: Reported on 11/18/2021) 12 Tablet 0     No current facility-administered medications on file prior to visit. Past Medical History:   Diagnosis Date    Adverse effect of anesthesia     Difficult to awaken    Anxiety     Asthma     Controlled    Depression     Hypertension     Morbid obesity ([address]     Nausea & vomiting        Past Surgical History:   Procedure Laterality Date    HX DILATION AND CURETTAGE  03/2019    HX LAP GASTRIC BYPASS  11/09/2010    HX LUMBAR DISKECTOMY  11/2020    HX OTHER SURGICAL      Bladder surger 1990    HX UROLOGICAL  1986    HX WISDOM TEETH EXTRACTION         Social History     Tobacco Use    Smoking status: Never Smoker    Smokeless tobacco: Never Used   Vaping Use    Vaping Use: Never used   Substance Use Topics    Alcohol use:  Yes     Alcohol/week: 1.0 standard drink     Types: 1 Cans of beer per week     Comment: socially    Drug use: Never       Family History   Problem Relation Age of Onset    Hypertension Mother     Cancer Mother     Hypertension Father     Other Brother         Born with heart murmur    Cancer Maternal Grandmother         Breast    Diabetes Maternal Grandfather        ROS:  General: Negative for fevers, chills, night sweats, fatigue, weight loss, or weight gain. GI: Negative for abdominal pain, change in bowel habits, hematochezia, melena, or GERD. Integumentary: Negative for dermatitis or abnormal moles. HEENT: Negative for visual changes, vertigo, epistaxis, dysphagia, or hoarseness    Cardiac: Negative for chest pain, palpitations, hypertension, edema, or varicosities    Resp: Negative for cough, shortness of breath, wheezing, hemoptysis, snoring, or reactive airway disease    : Negative for hematuria, dysuria, frequency, urgency, nocturia, or stress urinary incontinence    MSK:  Negative for weakness, joint pain, or arthritis    Endocrine: Negative for diabetes, thyroid disease, polyuria, polydipsia, polyphagia, poor wound, heat intolerance, or cold intolerance. Lymph/Heme: Negative for anemia, bruising, or history of blood transfusions. Neuro:  Negative for dizziness, headache, fainting, seizures, and stroke. Psychiatry:  Negative for anxiety or depression    Physical Exam    Visit Vitals  /73 (BP 1 Location: Left upper arm, BP Patient Position: At rest, BP Cuff Size: Adult)   Pulse 79   Temp (!) 96.4 °F (35.8 °C) (Oral)   Resp 20   Ht 5' 3\" (1.6 m)   Wt 142.4 kg (314 lb)   BMI 55.62 kg/m²       Nursing note reviewed. General: 43 y.o. female is in no acute distress. Head: Normocephalic, atraumatic  Resp: Clear to auscultation bilaterally, no wheezing, rhonchi, or rales, excursions normal and symmetrical.  Cardio: Regular rate and rhythm, no murmurs, clicks, gallops, or rubs. No edema or varicosities.   Abdomen: Obese, soft, nontender, nondistended, normoactive bowel sounds, no hernias, no hepatosplenomegaly, wounds clean dry approximated appropriate surrounding induration incisional tenderness without evidence of infectious complications or incisional hernia  Psych: Alert and oriented to person, place, and time.     Impression    Status post laparoscopic gastric bypass November 9, 2021 with appropriate weight loss and comorbidity resolution      Plan    Formal bariatric nutrition evaluation for assistance with advancement of diet  Continue compliance with the bariatric surgical diet, exercise regimen, vitamin supplementation protocol, encourage monthly attendance at bedside support group meetings  Follow-up February 2021 with 3-month labs for ongoing back surgical evaluation

## 2021-11-18 NOTE — PROGRESS NOTES
Loraine Ayon is a 43 y.o. female that presents today to follow up post  LGBP  preformed on 11/09/2021. Pt says pain level is at a 0 on a// scale from 1-10. Pt is drinking 60oz of fluid daily. Pt is currently eating egg,refreid beans,yogurt protein shaks. Pt says the amount of time spent exercising is walking 30 minutes daily. Body mass index is 55.62 kg/m². 1. Have you been to the ER, urgent care clinic since your last visit? Hospitalized since your last visit? No    2. Have you seen or consulted any other health care providers outside of the [address] [place] [address] since your last visit? Include any pap smears or colon screening.  No                    ]

## 2021-12-15 ENCOUNTER — DOCUMENTATION ONLY (OUTPATIENT)
Dept: BARIATRICS/WEIGHT MGMT | Age: 36
End: 2021-12-15

## 2021-12-15 ENCOUNTER — HOSPITAL ENCOUNTER (OUTPATIENT)
Dept: BARIATRICS/WEIGHT MGMT | Age: 36
Discharge: HOME | End: 2021-12-15

## 2021-12-15 NOTE — PROGRESS NOTES
[facility]  POST-OP NUTRITION FOLLOW UP    Patient's Name: Burke Rodriguez  YOB: 1985  Surgery Date: 2021      Procedure: Gastric Bypass    Surgeon: Dr. Dania Fagan    Height: 5 f 3     Pre-Op Weight: 322     Current Weight: 297  Weight Lost: 25    BMI:  52.7    Attendance of support group:   When:   Why not:     Complications  Readmittance: None  Reoperations: None  Complications: None  IV Fluids: None  ER Trips: None    Problem Areas:   Nausea: If she eats too fast, she may get sick   Vomiting: Yes, if she eats too fast.   Dumping Syndrome: None  Inadequate Protein: Yes. She states she doesn't have desire to eat or drink. Inadequate Fluids: Yes. She thinks she is around 40 ounces  Food Intolerance:   Hunger: None  Constipation: Yes. She states she will go every 3-4 days. Eating 3 Meals/Day: 2 meals. Missing breakfast.    Portion Size at Meals: 1 ounce or less     Protein from Food:     Foods being consumed:  Breakfast: Time: 8-10 am:  Protein shake or just regular drinks. She states she may just do gatorade zero. Lunch: Time: 1:00 pm:   She states she may have a yogurt (Too Good) or maybe a protein shake. Dinner:  Time: 6:00 pm:   Chicken, fish. Some type of protein with her . In-between eating: None    Length of time for meals: 10 minutes to get through what she wants to eat and 10 more minutes contemplating if she wants more.     food/fluids: 30/30    Fluids: 40 oz/day   Types of Fluids: water, protein shake, Gatorade zero    MVI: [place] Complete    Number/Day: bid   Taken Separately: Yes    Vitamin B1: Yes  Dosin mg    Calcium: Sergio Melts - calcium citrate    Calcium Dosing: tid    Taken Separately: Yes    Vitamin B12: sublingual   Vitamin B12 Dosin mcg    Vitamin D: Yes     Vitamin D dosing: 10,000 IU    Iron: Yes    Iron dosin mg     Protein Supplement: Premier or a low sugar Ensure     Grams of Protein: 30   Mixed with:     Splitting Protein Drink in 1/2:    Timing of Protein Drinks:   Patient is taking 3-4 days a week. Exercise: Walking. 4-5 x a week. Comments:    Weight loss is slightly less than expected. Patient is not always eating stating she does not have a desire. She is not always eating or is only at 40 ounces of fluid. I have talked to her about meal times, we talked about protein goals and foods she can introduce. Patient was instructed to eat 3 meals and aim for 1 shake a day, which she can split throughout the day. She is also encouraged to work on increasing her fluid and tips were were reviewed. She is compliant with vitamins. Patient is encouraged to increase her activity level. Will monitor her progress. Patient was educated on the importance of eating meat and vegetables only. I have talked with patient about the effects of carbohydrates, not only from a weight management perspective, but also what effects it could have on their blood sugar and what reactive hypoglycemia is.         Diet Follow Up:  9 month class scheduled for: August 4      Tanna Cleaning RD    12/15/2021

## 2022-01-24 ENCOUNTER — HOSPITAL ENCOUNTER (OUTPATIENT)
Dept: LAB | Age: 37
Discharge: HOME | End: 2022-01-24
Payer: COMMERCIAL

## 2022-01-24 DIAGNOSIS — K91.2 POSTOPERATIVE INTESTINAL MALABSORPTION: ICD-10-CM

## 2022-01-24 LAB
ALBUMIN SERPL-MCNC: 3.5 G/DL (ref 3.4–5)
ERYTHROCYTE [DISTWIDTH] IN BLOOD BY AUTOMATED COUNT: 14.6 % (ref 11.6–14.5)
HCT VFR BLD AUTO: 46.6 % (ref 35–45)
HGB BLD-MCNC: 14.3 G/DL (ref 12–16)
IRON SERPL-MCNC: 56 UG/DL (ref 50–175)
MCH RBC QN AUTO: 27.6 PG (ref 24–34)
MCHC RBC AUTO-ENTMCNC: 30.7 G/DL (ref 31–37)
MCV RBC AUTO: 90 FL (ref 78–100)
NRBC # BLD: 0 K/UL (ref 0–0.01)
NRBC BLD-RTO: 0 PER 100 WBC
PLATELET # BLD AUTO: 241 K/UL (ref 135–420)
PMV BLD AUTO: 16.3 FL (ref 9.2–11.8)
RBC # BLD AUTO: 5.18 M/UL (ref 4.2–5.3)
WBC # BLD AUTO: 5.8 K/UL (ref 4.6–13.2)

## 2022-01-24 PROCEDURE — 82040 ASSAY OF SERUM ALBUMIN: CPT

## 2022-01-24 PROCEDURE — 83540 ASSAY OF IRON: CPT

## 2022-01-24 PROCEDURE — 84425 ASSAY OF VITAMIN B-1: CPT

## 2022-01-24 PROCEDURE — 36415 COLL VENOUS BLD VENIPUNCTURE: CPT

## 2022-01-24 PROCEDURE — 85027 COMPLETE CBC AUTOMATED: CPT

## 2022-02-03 ENCOUNTER — OFFICE VISIT (OUTPATIENT)
Dept: SURGERY | Age: 37
End: 2022-02-03
Payer: COMMERCIAL

## 2022-02-03 VITALS
OXYGEN SATURATION: 100 % | HEIGHT: 63 IN | SYSTOLIC BLOOD PRESSURE: 138 MMHG | DIASTOLIC BLOOD PRESSURE: 87 MMHG | WEIGHT: 278 LBS | TEMPERATURE: 98 F | HEART RATE: 74 BPM | BODY MASS INDEX: 49.26 KG/M2

## 2022-02-03 DIAGNOSIS — E66.01 MORBID OBESITY (HCC): ICD-10-CM

## 2022-02-03 DIAGNOSIS — K91.2 POST-RESECTION MALABSORPTION: Primary | ICD-10-CM

## 2022-02-03 DIAGNOSIS — Z98.84 S/P GASTRIC BYPASS: ICD-10-CM

## 2022-02-03 LAB — VIT B1 BLD-SCNC: 185.1 NMOL/L (ref 66.5–200)

## 2022-02-03 PROCEDURE — 99024 POSTOP FOLLOW-UP VISIT: CPT | Performed by: NURSE PRACTITIONER

## 2022-02-03 RX ORDER — LANOLIN ALCOHOL/MO/W.PET/CERES
CREAM (GRAM) TOPICAL
COMMUNITY

## 2022-02-03 RX ORDER — GLUCOSAMINE/CHONDR SU A SOD 750-600 MG
TABLET ORAL
COMMUNITY

## 2022-02-03 NOTE — PROGRESS NOTES
Bariatric Postoperative Progress Note    CC: 3 Month LGBP Follow Up    Erasmo Gates is a 42 y.o. female now 3 months status post laparoscopic gastric bypass surgery performed on 11/9/21. Doing well overall. Currently eating chicken, turkey, deli meat, yogurt, cheese, protein shake, green beans, cucumbers, peas, protein chips, potatoes, occ sugar free chocolate. Taking in 40-60 oz water,  Unknown g protein. 30 min of walking 4 days a week. Bowel movements are regular. The patient is not having any pain. She is compliant with multivitamins, calcium, Vit D and B12 supplements. Feels that she is not losing enough weight. Denies any NSAID, ETOH, or tobacco use.        Weight Loss Metrics 2/3/2022 2/3/2022 11/18/2021 11/18/2021 11/9/2021 9/23/2021 9/23/2021   Pre op / Initial Wt 322 - 322 - - 322 -   Today's Wt - 278 lb - 314 lb 325 lb - 322 lb   BMI - 49.25 kg/m2 - 55.62 kg/m2 57.57 kg/m2 - 57.04 kg/m2   Ideal Body Wt 131 - 131 - - 131 -   Excess Body Wt 191 - 191 - - 191 -   Goal Wt 169 - 169 - - 169 -   Wt loss to date 44 - 8 - - 0 -   % Wt Loss 0.29 - 0.05 - - 0 -   80% .8 - 152.8 - - 152.8 -     Comorbidities:  Hypertension: resolved  Diabetes: not applicable  Obstructive Sleep Apnea: resolved  Hyperlipidemia: not applicable  Stress Urinary Incontinence: not applicable  Gastroesophageal Reflux: not applicable  Weight related arthropathy:improved      Past Medical History:   Diagnosis Date    Adverse effect of anesthesia     Difficult to awaken    Anxiety     Asthma     Controlled    Depression     Hypertension     Morbid obesity (HCC)     Nausea & vomiting        Past Surgical History:   Procedure Laterality Date    HX DILATION AND CURETTAGE  03/2019    HX LAP GASTRIC BYPASS  11/09/2010    HX LUMBAR DISKECTOMY  11/2020    HX OTHER SURGICAL      Bladder surger 1990    HX UROLOGICAL  1986    HX WISDOM TEETH EXTRACTION         Current Outpatient Medications   Medication Sig Dispense Refill  ferrous sulfate (Iron) 325 mg (65 mg iron) tablet Take  by mouth Daily (before breakfast).  Lactobac no.41/Bifidobact no.7 (PROBIOTIC-10 PO) Take  by mouth.  Biotin 2,500 mcg cap Take  by mouth.  multivitamin (ONE A DAY) tablet Take 1 Tablet by mouth daily. Beri melts      calcium-cholecalciferol, d3, (CALCIUM 600 + D) 600-125 mg-unit tab Take  by mouth.  ergocalciferol (Vitamin D2) 1,250 mcg (50,000 unit) capsule Take 50,000 Units by mouth.  cyanocobalamin 1,000 mcg tablet Take 1,000 mcg by mouth daily.  ARIPiprazole (Abilify) 5 mg tablet Take 5 mg by mouth daily.  dextroamphetamine-amphetamine (AdderalL) 15 mg tablet Take 15 mg by mouth two (2) times a day.  FLUoxetine (PROzac) 20 mg capsule Take 20 mg by mouth daily.  VENTOLIN HFA 90 mcg/actuation inhaler INHALE 1 PUFF BY MOUTH EVERY 4 HOURS AS NEEDED FOR WHEEZING (Patient not taking: Reported on 2/3/2022) 18 g 2       Allergies   Allergen Reactions    Ativan [Lorazepam] Other (comments)     Headaches     Other Plant, Animal, Environmental Other (comments)       ROS:  Review of Systems   Constitutional: Positive for weight loss. Negative for malaise/fatigue. Eyes: Negative. Respiratory: Negative for cough and shortness of breath. Cardiovascular: Negative for chest pain and palpitations. Gastrointestinal: Positive for constipation. Negative for abdominal pain, blood in stool, diarrhea, heartburn, melena, nausea and vomiting. Genitourinary: Negative. Skin: Negative. Neurological: Negative for dizziness, tingling, loss of consciousness, weakness and headaches. Physicial Exam:  Visit Vitals  /87 (BP 1 Location: Right arm, BP Patient Position: Sitting)   Pulse 74   Temp 98 °F (36.7 °C)   Ht 5' 3\" (1.6 m)   Wt 126.1 kg (278 lb)   SpO2 100%   BMI 49.25 kg/m²     Physical Exam  Vitals and nursing note reviewed. Constitutional:       Appearance: She is obese.    HENT:      Head: Normocephalic and atraumatic. Cardiovascular:      Rate and Rhythm: Normal rate. Pulses: Normal pulses. Heart sounds: Normal heart sounds. Pulmonary:      Effort: Pulmonary effort is normal.      Breath sounds: Normal breath sounds. Abdominal:      General: Bowel sounds are normal. There is no distension. Palpations: Abdomen is soft. There is no mass. Tenderness: There is no abdominal tenderness. Hernia: No hernia is present. Musculoskeletal:         General: Normal range of motion. Skin:     General: Skin is warm and dry. Neurological:      General: No focal deficit present. Mental Status: She is alert and oriented to person, place, and time. Psychiatric:         Mood and Affect: Mood normal.         Behavior: Behavior normal.         Labs:   Recent Results (from the past 672 hour(s))   ALBUMIN    Collection Time: 01/24/22  9:00 AM   Result Value Ref Range    Albumin 3.5 3.4 - 5.0 g/dL   VITAMIN B1, WHOLE BLOOD    Collection Time: 01/24/22  9:00 AM   Result Value Ref Range    Vitamin B1 185.1 66.5 - 200.0 nmol/L   CBC W/O DIFF    Collection Time: 01/24/22  9:00 AM   Result Value Ref Range    WBC 5.8 4.6 - 13.2 K/uL    RBC 5.18 4.20 - 5.30 M/uL    HGB 14.3 12.0 - 16.0 g/dL    HCT 46.6 (H) 35.0 - 45.0 %    MCV 90.0 78.0 - 100.0 FL    MCH 27.6 24.0 - 34.0 PG    MCHC 30.7 (L) 31.0 - 37.0 g/dL    RDW 14.6 (H) 11.6 - 14.5 %    PLATELET 494 464 - 945 K/uL    MPV 16.3 (H) 9.2 - 11.8 FL    NRBC 0.0 0  WBC    ABSOLUTE NRBC 0.00 0.00 - 0.01 K/uL   IRON    Collection Time: 01/24/22  9:00 AM   Result Value Ref Range    Iron 56 50 - 175 ug/dL       Assessment/Plan:   She is currently 3 months s/p laparoscopic gastric bypass surgery with a total weight loss of 44 lbs to date, doing well. Per [facility] Bariatric Surgical Calculator, she is meeting goals as they estimate -44.73 lbs at this point.   Labs were reviewed and pt was instructed to continue MVI/B1/B12/D supplementation  Constipation protocol reviewed. Reassurance given for weight loss progress. Per above, she is right on target per [facility] calculations. Increase SF fluid intake to 64 oz, increase intensity of PA, and avoid high density carbohydrates. Long discussion regarding high density carbohydrates and their impacts to weight regain, hunger, cravings and reactive hypoglycemia. We have reviewed the components of a successful postoperative course including requirement for a high protein, low carbohydrate diet, 64 oz a day of zero calorie liquids, daily vitamin supplementation, daily exercise (150 mis/week), regular follow-up, and participation in support groups. Refer to registered dietitian for more detailed medical nutrition therapy as needed. The primary encounter diagnosis was Post-resection malabsorption. Diagnoses of S/P gastric bypass, Morbid obesity ([address] and BMI 45.0-49.9, adult [facility]) were also pertinent to this visit. Follow-up and Dispositions    · Return in about 3 months (around 5/3/2022). with labs, sooner as needed.   Homero Cobb NP

## 2022-03-18 PROBLEM — E66.01 MORBID OBESITY WITH BMI OF 50.0-59.9, ADULT (HCC): Status: ACTIVE | Noted: 2021-11-09

## 2022-03-18 PROBLEM — E66.01 OBESITY, MORBID (HCC): Status: ACTIVE | Noted: 2018-02-02

## 2022-03-19 PROBLEM — F32.2 DEPRESSION, MAJOR, SINGLE EPISODE, SEVERE (HCC): Status: ACTIVE | Noted: 2019-04-22

## 2022-03-19 PROBLEM — R07.89 CHEST WALL PAIN: Status: ACTIVE | Noted: 2017-07-20

## 2022-04-25 LAB
25(OH)D3 SERPL-MCNC: 89.6 NG/ML (ref 32–100)
A-G RATIO,AGRAT: 1.5 RATIO (ref 1.1–2.6)
ALBUMIN SERPL-MCNC: 4.1 G/DL (ref 3.5–5)
ALP SERPL-CCNC: 118 U/L (ref 25–115)
ALT SERPL-CCNC: 24 U/L (ref 5–40)
ANION GAP SERPL CALC-SCNC: 11 MMOL/L (ref 3–15)
AST SERPL W P-5'-P-CCNC: 21 U/L (ref 10–37)
BILIRUB SERPL-MCNC: 0.3 MG/DL (ref 0.2–1.2)
BUN SERPL-MCNC: 10 MG/DL (ref 6–22)
CALCIUM SERPL-MCNC: 9.7 MG/DL (ref 8.4–10.5)
CHLORIDE SERPL-SCNC: 102 MMOL/L (ref 98–110)
CO2 SERPL-SCNC: 26 MMOL/L (ref 20–32)
CREAT SERPL-MCNC: 0.6 MG/DL (ref 0.5–1.2)
ERYTHROCYTE [DISTWIDTH] IN BLOOD BY AUTOMATED COUNT: 14.3 % (ref 10–15.5)
FERRITIN SERPL-MCNC: 90 NG/ML (ref 10–291)
FOLATE,FOL: >20 NG/ML
GFRAA, 66117: >60
GFRNA, 66118: >60
GLOBULIN,GLOB: 2.7 G/DL (ref 2–4)
GLUCOSE SERPL-MCNC: 104 MG/DL (ref 70–99)
HCT VFR BLD AUTO: 43.8 % (ref 35.1–46.5)
HGB BLD-MCNC: 14.6 G/DL (ref 11.7–15.5)
IMMATURE PLATELET FRACTION: 6.3 % (ref 1.1–7.1)
IRON,IRN: 72 MCG/DL (ref 30–160)
MCH RBC QN AUTO: 30 PG (ref 26–34)
MCHC RBC AUTO-ENTMCNC: 33 G/DL (ref 31–36)
MCV RBC AUTO: 90 FL (ref 80–99)
PLATELET # BLD AUTO: 237 K/UL (ref 140–440)
PMV BLD AUTO: 13.6 FL (ref 9–13)
POTASSIUM SERPL-SCNC: 4.7 MMOL/L (ref 3.5–5.5)
PROT SERPL-MCNC: 6.8 G/DL (ref 6.4–8.3)
RBC # BLD AUTO: 4.85 M/UL (ref 3.8–5.2)
SODIUM SERPL-SCNC: 139 MMOL/L (ref 133–145)
VIT B12 SERPL-MCNC: >2000 PG/ML (ref 211–911)
VITAMIN B1, WHOLE BLOOD, 66250: 216.6 NMOL/L (ref 66.5–200)
WBC # BLD AUTO: 6.6 K/UL (ref 4–11)

## 2022-05-05 ENCOUNTER — OFFICE VISIT (OUTPATIENT)
Dept: SURGERY | Age: 37
End: 2022-05-05
Payer: COMMERCIAL

## 2022-05-05 VITALS
DIASTOLIC BLOOD PRESSURE: 86 MMHG | HEART RATE: 70 BPM | TEMPERATURE: 98 F | WEIGHT: 247 LBS | SYSTOLIC BLOOD PRESSURE: 129 MMHG | OXYGEN SATURATION: 100 % | BODY MASS INDEX: 43.77 KG/M2 | HEIGHT: 63 IN

## 2022-05-05 DIAGNOSIS — E66.01 MORBID OBESITY (HCC): ICD-10-CM

## 2022-05-05 DIAGNOSIS — L98.7 EXCESSIVE AND REDUNDANT SKIN AND SUBCUTANEOUS TISSUE: ICD-10-CM

## 2022-05-05 DIAGNOSIS — K91.2 POST-RESECTION MALABSORPTION: Primary | ICD-10-CM

## 2022-05-05 DIAGNOSIS — Z98.84 S/P GASTRIC BYPASS: ICD-10-CM

## 2022-05-05 PROCEDURE — 99214 OFFICE O/P EST MOD 30 MIN: CPT | Performed by: NURSE PRACTITIONER

## 2022-05-05 RX ORDER — CHOLECALCIFEROL TAB 125 MCG (5000 UNIT) 125 MCG
5000 TAB ORAL 2 TIMES DAILY
COMMUNITY

## 2022-05-05 RX ORDER — LANOLIN ALCOHOL/MO/W.PET/CERES
CREAM (GRAM) TOPICAL DAILY
COMMUNITY

## 2022-05-05 RX ORDER — NYSTATIN 100000 [USP'U]/G
POWDER TOPICAL 4 TIMES DAILY
Qty: 1 EACH | Refills: 2 | Status: SHIPPED | OUTPATIENT
Start: 2022-05-05

## 2022-05-05 RX ORDER — NORETHINDRONE 0.35 MG/1
TABLET ORAL
COMMUNITY
Start: 2022-04-21

## 2022-05-05 NOTE — PROGRESS NOTES
Bariatric Postoperative Progress Note    CC: 6 Month LGBP Follow Up    Jose E Vaughn is a 42 y.o. female now 6 months status post laparoscopic gastric bypass surgery performed on 11/9/21. Doing well overall. Currently eating chicken, turkey, beef, ham, greek yogurt, cheese, protein shake, green bean, broccoli, sugar free chocolate. Taking in 50-60 oz water,  Unknown g protein. 30 min of walking/resistance bands 4-5 days a week. Bowel movements are constipated, using stool softeners prn. The patient is not having any pain. She is compliant with multivitamins, calcium, Vit D and B12 supplements. Denies any NSAID, ETOH, or tobacco use. Experiencing hair loss. Would like to get pregnant after 12 month follow up.      Weight Loss Metrics 5/5/2022 5/5/2022 2/3/2022 2/3/2022 11/18/2021 11/18/2021 11/9/2021   Pre op / Initial Wt 322 - 322 - 322 - -   Today's Wt - 247 lb - 278 lb - 314 lb 325 lb   BMI - 43.75 kg/m2 - 49.25 kg/m2 - 55.62 kg/m2 57.57 kg/m2   Ideal Body Wt 131 - 131 - 131 - -   Excess Body Wt 191 - 191 - 191 - -   Goal Wt 169 - 169 - 169 - -   Wt loss to date 75 - 44 - 8 - -   % Wt Loss 0.49 - 0.29 - 0.05 - -   80% .8 - 152.8 - 152.8 - -     Comorbidities:  Hypertension: resolved  Diabetes: not applicable  Obstructive Sleep Apnea: resolved  Hyperlipidemia: not applicable  Stress Urinary Incontinence: not applicable  Gastroesophageal Reflux: not applicable  Weight related arthropathy:improved        Past Medical History:   Diagnosis Date    Adverse effect of anesthesia     Difficult to awaken    Anxiety     Asthma     Controlled    Depression     Hypertension     Morbid obesity ([address]     Nausea & vomiting        Past Surgical History:   Procedure Laterality Date    HX DILATION AND CURETTAGE  03/2019    HX LAP GASTRIC BYPASS  11/09/2010    HX LUMBAR DISKECTOMY  11/2020    HX OTHER SURGICAL      Bladder surger 1990    HX UROLOGICAL  1986    HX WISDOM TEETH EXTRACTION         Current Outpatient Medications   Medication Sig Dispense Refill    thiamine HCL (B-1) 100 mg tablet Take  by mouth daily.  cholecalciferol (VITAMIN D3) (5000 Units/125 mcg) tab tablet Take 5,000 Units by mouth two (2) times a day.  nystatin (MYCOSTATIN) powder Apply  to affected area four (4) times daily. 1 Each 2    ferrous sulfate (Iron) 325 mg (65 mg iron) tablet Take  by mouth Daily (before breakfast).  Lactobac no.41/Bifidobact no.7 (PROBIOTIC-10 PO) Take  by mouth.  Biotin 2,500 mcg cap Take  by mouth.  multivitamin (ONE A DAY) tablet Take 1 Tablet by mouth daily. Beri melts      calcium-cholecalciferol, d3, (CALCIUM 600 + D) 600-125 mg-unit tab Take  by mouth.  cyanocobalamin 1,000 mcg tablet Take 1,000 mcg by mouth daily.  ARIPiprazole (Abilify) 5 mg tablet Take 5 mg by mouth daily.  dextroamphetamine-amphetamine (AdderalL) 15 mg tablet Take 15 mg by mouth two (2) times a day.  Shannon 0.35 mg tab       ergocalciferol (Vitamin D2) 1,250 mcg (50,000 unit) capsule Take 50,000 Units by mouth. (Patient not taking: Reported on 5/5/2022)      VENTOLIN HFA 90 mcg/actuation inhaler INHALE 1 PUFF BY MOUTH EVERY 4 HOURS AS NEEDED FOR WHEEZING (Patient not taking: Reported on 2/3/2022) 18 g 2       Allergies   Allergen Reactions    Ativan [Lorazepam] Other (comments)     Headaches     Other Plant, Animal, Environmental Other (comments)       ROS:  Review of Systems   Constitutional: Positive for weight loss. Negative for malaise/fatigue. Eyes: Negative. Respiratory: Negative. Cardiovascular: Negative for chest pain and palpitations. Gastrointestinal: Positive for blood in stool (hemorrhoids) and constipation. Negative for abdominal pain, diarrhea, heartburn, melena, nausea and vomiting. Genitourinary: Negative. Skin: Positive for itching. Negative for rash. Itching/burning/skin irritation under pannus from excess skin chafing.    Hair loss   Neurological: Negative for dizziness, tingling, loss of consciousness, weakness and headaches. Physicial Exam:  Visit Vitals  /86 (BP 1 Location: Left upper arm, BP Patient Position: Sitting)   Pulse 70   Temp 98 °F (36.7 °C)   Ht 5' 3\" (1.6 m)   Wt 112 kg (247 lb)   SpO2 100%   BMI 43.75 kg/m²     Physical Exam  Vitals and nursing note reviewed. Constitutional:       Appearance: She is obese. HENT:      Head: Normocephalic and atraumatic. Cardiovascular:      Rate and Rhythm: Normal rate. Heart sounds: Normal heart sounds. Pulmonary:      Effort: Pulmonary effort is normal.      Breath sounds: Normal breath sounds. Abdominal:      General: Bowel sounds are normal. There is no distension. Palpations: Abdomen is soft. There is no mass. Tenderness: There is no abdominal tenderness. Hernia: No hernia is present. Musculoskeletal:         General: Normal range of motion. Skin:     General: Skin is warm and dry. Neurological:      General: No focal deficit present. Mental Status: She is alert and oriented to person, place, and time. Psychiatric:         Mood and Affect: Mood normal.         Behavior: Behavior normal.         Labs:   No results found for this or any previous visit (from the past 672 hour(s)). Assessment/Plan:   She is currently 6 months s/p laparoscopic gastric bypass surgery with a total weight loss of 75 lbs to date, doing well. Meeting goals per [address] Labs were reviewed in Care Everyhwere and pt was instructed to continue MVI/B1/B12/D supplementation. Can decrease B12 to half tab daily or every other day. She is currently taking 10,000 units of vitamin D daily, discussed to take 3-4 days weekly. Reassurance provided for hair loss. Ensure getting at least 60 g protein daily and taking all recommended bariatric supplementation. Can start biotin. Discussed importance of refraining from pregnancy for at least 18 months after surgery.    Discussed skin care and use of support garments to assist with itching and irritation from excess skin chafing, will also prescribe Nystatin powder. We have reviewed the components of a successful postoperative course including requirement for a high protein, low carbohydrate diet, 64 oz a day of zero calorie liquids, daily vitamin supplementation, daily exercise (150 mis/week), regular follow-up, and participation in support groups. Refer to registered dietitian for more detailed medical nutrition therapy as needed. The primary encounter diagnosis was Post-resection malabsorption. Diagnoses of S/P gastric bypass, Morbid obesity ([address] BMI 40.0-44.9, adult ([address] and Excessive and redundant skin and subcutaneous tissue were also pertinent to this visit. with labs, sooner as needed.   Santiago Aceves NP

## 2022-07-28 ENCOUNTER — HOSPITAL ENCOUNTER (OUTPATIENT)
Dept: BARIATRICS/WEIGHT MGMT | Age: 37
Discharge: HOME | End: 2022-07-28

## 2022-07-28 ENCOUNTER — DOCUMENTATION ONLY (OUTPATIENT)
Dept: BARIATRICS/WEIGHT MGMT | Age: 37
End: 2022-07-28

## 2022-07-28 NOTE — PROGRESS NOTES
[address] [facility] [place] [facility] [address], [address]      Patient's Name: Echo Pink   Age: 42 y.o. YOB: 1985   Sex: female    Date:   2022    Height: 5 f 3 Weight:    225      Lbs. BMI: 39.9     Surgery Date: 22    Surgeon: Dr. Tram Elliott    Starting Weight: 322   Last Recorded Weight:   Overall Pounds Lost: 97     Procedure: Gastric Bypass    Lowest Weight patient has achieved since surgery: Current    How long has patient been at this weight for: Few days ago. Other Pertinent Information:     Diet History (reported by patient on diet history form)    Do you smoke: None    Alcohol Intake: None    Meals per day:2 plus protein shake    Diet History:  Breakfast:  Protein shake. Lunch:  Deli meat or some type or protein left over. Dinner:  Protein and vegetables. Snacks may be yogurt or string cheese. Very limited amount of carbohydrates. She states if she has a wrap, she will do a carb balance yogurt, but tries to limit this. Portion sizes ~: Whole hand. Patient states she eats, gets full, and stops. She states she does not go back to it. Simple sugar intake: None. She states she may have a piece of chocolate, but states it just doesn't satisfy her anymore. Experiencing dumping syndrome: None. Carbohydrate intake: Low carb wrap sometimes. Avoids chips/crackers. Symptoms that occur when carbohydrates are consumed: No symptoms of reactive hypoglycemia    Ounces of fluid consumed per day: 64    Fluids being consumed: water, protein shake. Sometimes diet v 8 juice    Patient is drinking protein drinks    Patient is snacking on: see above    Exercise History    Patient is doing walking and weighted hula hoop for exercise. She states she will do dances and get up and move while working. Walkin minutes 5 x a week.     Vitamins    Patient is taking [place] Complete (2) Multivitamins per day    Patient is taking Calcium in the form of chewable. Patient is taking 1500 mg per day. Patient is taking 1000 mcg of Vitamin B12. Patient is taking 5000 IU of Vitamin D 3 today. Patient is taking 100 mg of Vitamin B1. Patient is doing bariatric fusion iron 45 mg. Summary: Weight loss is at 95 pounds. I have reinforced the key diet principles to the patient. Patient was instructed to follow a 3 meal a day routine. I have reinforced the importance of filling up on meat and vegetables and avoiding carbohydrates. I have talked with patient about reactive hypoglycemia and although carbohydrates are not good from a weight-management point of view, they are actually very dangerous and should be avoided. If patient is getting hungry between meals focus on meat and vegetables and a list of food choices was reviewed with patient. Reinforced to patient the importance of being properly hydrated and the need to get 64 ounces of fluid in per day. Reinforced to patient the need to do 30 minutes of exercise per day. We also spent some time talking about the vitamins and the importance of taking them. Reinforced the dosage of vitamins to patient. Patient was reminded that the vitamins are lifelong. Other Pertinent Information: Overall, patient is doing well. Her portions are appropriate. She is sticking to protein and vegetables. She is taking her vitamins and has been educated on [facility]. She is walking 5 days a week. I have provided her with some handouts on tips for success and meal ideas that focus on protein and produce. Will monitor her progress.       Hellen Stevens Alexander [address]  7/28/2022

## 2022-11-01 ENCOUNTER — TELEPHONE (OUTPATIENT)
Dept: SURGERY | Age: 37
End: 2022-11-01

## 2022-11-01 DIAGNOSIS — Z98.84 S/P GASTRIC BYPASS: ICD-10-CM

## 2022-11-01 DIAGNOSIS — K91.2 POST-RESECTION MALABSORPTION: Primary | ICD-10-CM

## 2022-11-01 LAB
25(OH)D3 SERPL-MCNC: 61.1 NG/ML (ref 32–100)
A-G RATIO,AGRAT: 1.6 RATIO (ref 1.1–2.6)
ABSOLUTE LYMPHOCYTE COUNT, 10803: 2 K/UL (ref 1–4.8)
ALBUMIN SERPL-MCNC: 4.1 G/DL (ref 3.5–5)
ALP SERPL-CCNC: 98 U/L (ref 25–115)
ALT SERPL-CCNC: 29 U/L (ref 5–40)
ANION GAP SERPL CALC-SCNC: 10 MMOL/L (ref 3–15)
AST SERPL W P-5'-P-CCNC: 21 U/L (ref 10–37)
BASOPHILS # BLD: 0.1 K/UL (ref 0–0.2)
BASOPHILS NFR BLD: 1 % (ref 0–2)
BILIRUB SERPL-MCNC: 0.4 MG/DL (ref 0.2–1.2)
BUN SERPL-MCNC: 13 MG/DL (ref 6–22)
CALCIUM SERPL-MCNC: 10 MG/DL (ref 8.4–10.5)
CHLORIDE SERPL-SCNC: 103 MMOL/L (ref 98–110)
CHOLEST SERPL-MCNC: 173 MG/DL (ref 110–200)
CO2 SERPL-SCNC: 27 MMOL/L (ref 20–32)
CREAT SERPL-MCNC: 0.6 MG/DL (ref 0.5–1.2)
EOSINOPHIL # BLD: 0.2 K/UL (ref 0–0.5)
EOSINOPHIL NFR BLD: 3 % (ref 0–6)
ERYTHROCYTE [DISTWIDTH] IN BLOOD BY AUTOMATED COUNT: 13 % (ref 10–15.5)
FE % SATURATION,PSAT: 38 % (ref 20–50)
FERRITIN SERPL-MCNC: 114 NG/ML (ref 10–291)
FOLATE,FOL: >20 NG/ML
GLOBULIN,GLOB: 2.6 G/DL (ref 2–4)
GLOMERULAR FILTRATION RATE: >60 ML/MIN/1.73 SQ.M.
GLUCOSE SERPL-MCNC: 76 MG/DL (ref 70–99)
GRANULOCYTES,GRANS: 57 % (ref 40–75)
HCT VFR BLD AUTO: 44.5 % (ref 35.1–46.5)
HDLC SERPL-MCNC: 3.1 MG/DL (ref 0–5)
HDLC SERPL-MCNC: 56 MG/DL
HGB BLD-MCNC: 14.7 G/DL (ref 11.7–15.5)
IRON,IRN: 101 MCG/DL (ref 30–160)
LDL/HDL RATIO,LDHD: 1.9
LDLC SERPL CALC-MCNC: 104 MG/DL (ref 50–99)
LYMPHOCYTES, LYMLT: 33 % (ref 20–45)
MCH RBC QN AUTO: 30 PG (ref 26–34)
MCHC RBC AUTO-ENTMCNC: 33 G/DL (ref 31–36)
MCV RBC AUTO: 91 FL (ref 80–99)
MONOCYTES # BLD: 0.4 K/UL (ref 0.1–1)
MONOCYTES NFR BLD: 7 % (ref 3–12)
NEUTROPHILS # BLD AUTO: 3.4 K/UL (ref 1.8–7.7)
NON-HDL CHOLESTEROL, 011976: 117 MG/DL
PLATELET # BLD AUTO: 241 K/UL (ref 140–440)
PMV BLD AUTO: 12 FL (ref 9–13)
POTASSIUM SERPL-SCNC: 4.6 MMOL/L (ref 3.5–5.5)
PROT SERPL-MCNC: 6.7 G/DL (ref 6.4–8.3)
RBC # BLD AUTO: 4.87 M/UL (ref 3.8–5.2)
SODIUM SERPL-SCNC: 140 MMOL/L (ref 133–145)
TIBC,TIBC: 265 MCG/DL (ref 228–428)
TRIGL SERPL-MCNC: 67 MG/DL (ref 40–149)
UIBC SERPL-MCNC: 164 MCG/DL (ref 110–370)
VIT B12 SERPL-MCNC: 714 PG/ML (ref 211–911)
VLDLC SERPL CALC-MCNC: 13 MG/DL (ref 8–30)
WBC # BLD AUTO: 6 K/UL (ref 4–11)

## 2022-11-04 LAB — VITAMIN B1, WHOLE BLOOD, 66250: 148.1 NMOL/L (ref 66.5–200)

## 2022-11-15 ENCOUNTER — OFFICE VISIT (OUTPATIENT)
Dept: SURGERY | Age: 37
End: 2022-11-15
Payer: COMMERCIAL

## 2022-11-15 VITALS
SYSTOLIC BLOOD PRESSURE: 121 MMHG | OXYGEN SATURATION: 98 % | TEMPERATURE: 98 F | HEART RATE: 61 BPM | WEIGHT: 216 LBS | HEIGHT: 63 IN | DIASTOLIC BLOOD PRESSURE: 80 MMHG | BODY MASS INDEX: 38.27 KG/M2

## 2022-11-15 DIAGNOSIS — Z98.84 S/P GASTRIC BYPASS: ICD-10-CM

## 2022-11-15 DIAGNOSIS — K91.2 POST-RESECTION MALABSORPTION: Primary | ICD-10-CM

## 2022-11-15 DIAGNOSIS — L98.7 EXCESSIVE AND REDUNDANT SKIN AND SUBCUTANEOUS TISSUE: ICD-10-CM

## 2022-11-15 DIAGNOSIS — E66.9 OBESITY (BMI 30-39.9): ICD-10-CM

## 2022-11-15 PROCEDURE — 3078F DIAST BP <80 MM HG: CPT | Performed by: NURSE PRACTITIONER

## 2022-11-15 PROCEDURE — 3074F SYST BP LT 130 MM HG: CPT | Performed by: NURSE PRACTITIONER

## 2022-11-15 PROCEDURE — 99214 OFFICE O/P EST MOD 30 MIN: CPT | Performed by: NURSE PRACTITIONER

## 2022-11-15 RX ORDER — SERTRALINE HYDROCHLORIDE 100 MG/1
TABLET, FILM COATED ORAL DAILY
COMMUNITY

## 2022-11-15 RX ORDER — LISDEXAMFETAMINE DIMESYLATE 20 MG/1
CAPSULE ORAL
COMMUNITY
Start: 2022-10-07

## 2022-11-15 RX ORDER — NYSTATIN 100000 [USP'U]/G
POWDER TOPICAL 4 TIMES DAILY
Qty: 1 EACH | Refills: 2 | Status: SHIPPED | OUTPATIENT
Start: 2022-11-15

## 2022-11-15 NOTE — PROGRESS NOTES
Bariatric Postoperative Progress Note    CC: 12 Month LGBP Follow Up    Rafael Carroll is a 42 y.o. female now 1 year status post laparoscopic gastric bypass surgery performed on 11/9/21. Doing well overall. Currently eating protein shake, steak, ham, chicken, ground beef, peas, carrots, green, beans, broccoli, chips, sweets, tortilla. Taking in 64 oz water, 90 g protein. 30 min of activity 2-3 days a week. Bowel movements are regular. She is compliant with multivitamins, calcium, Vit D and B12 supplements, currently taking Procare Bariatric MVI and calcium. Hair loss has improved. Feels that she is hungrier, especially in the afternoons and will find herself snacking. Will usually have coffee with protein shake for breakfast and often for lunch as well. No longer desires to get pregnant, planning for  to have vasectomy next year, currently using cycle tracking to prevent pregnancy. ETOH (tried some wine), denies tobacco and NSAIDs.      Weight Loss Metrics 11/15/2022 11/15/2022 5/5/2022 5/5/2022 2/3/2022 2/3/2022 11/18/2021   Pre op / Initial Wt 322 - 322 - 322 - 322   Today's Wt - 216 lb - 247 lb - 278 lb -   BMI - 38.26 kg/m2 - 43.75 kg/m2 - 49.25 kg/m2 -   Ideal Body Wt 131 - 131 - 131 - 131   Excess Body Wt 191 - 191 - 191 - 191   Goal Wt 169 - 169 - 169 - 169   Wt loss to date 106 - 75 - 44 - 8   % Wt Loss 0.69 - 0.49 - 0.29 - 0.05   80% .8 - 152.8 - 152.8 - 152.8       Comorbidities:  Hypertension: resolved  Diabetes: not applicable  Obstructive Sleep Apnea: resolved  Hyperlipidemia: not applicable  Stress Urinary Incontinence: not applicable  Gastroesophageal Reflux: not applicable  Weight related arthropathy:improved      Past Medical History:   Diagnosis Date    Adverse effect of anesthesia     Difficult to awaken    Anxiety     Asthma     Controlled    Depression     Hypertension     Morbid obesity (HCC)     Nausea & vomiting        Past Surgical History:   Procedure Laterality Date    HX DILATION AND CURETTAGE  03/2019    HX LAP GASTRIC BYPASS  11/09/2010    HX LUMBAR DISKECTOMY  11/2020    HX OTHER SURGICAL      Bladder surger 1990    HX UROLOGICAL  1986    HX WISDOM TEETH EXTRACTION         Current Outpatient Medications   Medication Sig Dispense Refill    Vyvanse 20 mg capsule       sertraline (Zoloft) 100 mg tablet Take  by mouth daily. nystatin (MYCOSTATIN) powder Apply  to affected area four (4) times daily. 1 Each 2    multivitamin (ONE A DAY) tablet Take 1 Tablet by mouth daily. Beri melts      calcium-cholecalciferol, d3, (CALCIUM 600 + D) 600-125 mg-unit tab Take  by mouth. VENTOLIN HFA 90 mcg/actuation inhaler INHALE 1 PUFF BY MOUTH EVERY 4 HOURS AS NEEDED FOR WHEEZING 18 g 2       Allergies   Allergen Reactions    Ativan [Lorazepam] Other (comments)     Headaches     Other Plant, Animal, Environmental Other (comments)       ROS:  Review of Systems   Constitutional:  Positive for weight loss. Negative for malaise/fatigue. Eyes: Negative. Respiratory: Negative. Cardiovascular:  Negative for chest pain and palpitations. Gastrointestinal:  Negative for abdominal pain, blood in stool, constipation, diarrhea, heartburn, melena, nausea and vomiting. Skin:  Positive for itching and rash. Itching/burning/skin irritation under pannus from excess skin chafing. Neurological:  Positive for headaches. Negative for dizziness, tingling, loss of consciousness and weakness. Physicial Exam:  Visit Vitals  /80 (BP 1 Location: Right arm, BP Patient Position: Sitting)   Pulse 61   Temp 98 °F (36.7 °C) (Temporal)   Ht 5' 3\" (1.6 m)   Wt 98 kg (216 lb)   SpO2 98%   BMI 38.26 kg/m²     Physical Exam  Vitals and nursing note reviewed. Constitutional:       Appearance: She is obese. HENT:      Head: Normocephalic and atraumatic. Cardiovascular:      Rate and Rhythm: Normal rate. Heart sounds: Normal heart sounds.    Pulmonary:      Effort: Pulmonary effort is normal.      Breath sounds: Normal breath sounds. Abdominal:      General: Bowel sounds are normal. There is no distension. Palpations: Abdomen is soft. There is no mass. Tenderness: There is no abdominal tenderness. Hernia: No hernia is present. Musculoskeletal:         General: Normal range of motion. Skin:     General: Skin is warm and dry. Neurological:      General: No focal deficit present. Mental Status: She is alert and oriented to person, place, and time. Psychiatric:         Mood and Affect: Mood normal.         Behavior: Behavior normal.       Labs:   Recent Results (from the past 672 hour(s))   CBC WITH AUTOMATED DIFF    Collection Time: 11/01/22  9:14 AM   Result Value Ref Range    WBC 6.0 4.0 - 11.0 K/uL    RBC 4.87 3.80 - 5.20 M/uL    HGB 14.7 11.7 - 15.5 g/dL    HCT 44.5 35.1 - 46.5 %    MCV 91 80 - 99 fL    MCH 30 26 - 34 pg    MCHC 33 31 - 36 g/dL    RDW 13.0 10.0 - 15.5 %    PLATELET 844 711 - 027 K/uL    MPV 12.0 9.0 - 13.0 fL    NEUTROPHILS 57 40 - 75 %    Lymphocytes 33 20 - 45 %    MONOCYTES 7 3 - 12 %    EOSINOPHILS 3 0 - 6 %    BASOPHILS 1 0 - 2 %    ABS. NEUTROPHILS 3.4 1.8 - 7.7 K/uL    ABSOLUTE LYMPHOCYTE COUNT 2.0 1.0 - 4.8 K/uL    ABS. MONOCYTES 0.4 0.1 - 1.0 K/uL    ABS. EOSINOPHILS 0.2 0.0 - 0.5 K/uL    ABS. BASOPHILS 0.1 0.0 - 0.2 K/uL   METABOLIC PANEL, COMPREHENSIVE    Collection Time: 11/01/22  9:14 AM   Result Value Ref Range    Glucose 76 70 - 99 mg/dL    BUN 13 6 - 22 mg/dL    Creatinine 0.6 0.5 - 1.2 mg/dL    Sodium 140 133 - 145 mmol/L    Potassium 4.6 3.5 - 5.5 mmol/L    Chloride 103 98 - 110 mmol/L    CO2 27 20 - 32 mmol/L    AST (SGOT) 21 10 - 37 U/L    ALT (SGPT) 29 5 - 40 U/L    Alk.  phosphatase 98 25 - 115 U/L    Bilirubin, total 0.4 0.2 - 1.2 mg/dL    Calcium 10.0 8.4 - 10.5 mg/dL    Protein, total 6.7 6.4 - 8.3 g/dL    Albumin 4.1 3.5 - 5.0 g/dL    A-G Ratio 1.6 1.1 - 2.6 ratio    Globulin 2.6 2.0 - 4.0 g/dL    GLOMERULAR FILTRATION RATE >60.0 >60.0 mL/min/1.73 sq.m. Anion gap 10.0 3.0 - 15.0 mmol/L   VITAMIN B1, WHOLE BLOOD    Collection Time: 11/01/22  9:14 AM   Result Value Ref Range    VITAMIN B1, WHOLE BLOOD 148.1 66.5 - 200.0 nmol/L   FERRITIN    Collection Time: 11/01/22  9:14 AM   Result Value Ref Range    Ferritin 114 10 - 291 ng/mL   LIPID PANEL    Collection Time: 11/01/22  9:14 AM   Result Value Ref Range    Triglyceride 67 40 - 149 mg/dL    HDL Cholesterol 56 >=40 mg/dL    Cholesterol, total 173 110 - 200 mg/dL    CHOLESTEROL/HDL 3.1 0.0 - 5.0    Non-HDL Cholesterol 117 <130 mg/dL    LDL, calculated 104 (H) 50 - 99 mg/dL    VLDL, calculated 13 8 - 30 mg/dL    LDL/HDL Ratio 1.9    FOLATE    Collection Time: 11/01/22  9:14 AM   Result Value Ref Range    Folate >20.00 >=3.10 ng/mL   IRON PROFILE    Collection Time: 11/01/22  9:14 AM   Result Value Ref Range    Iron 101 30 - 160 mcg/dL    UIBC 164 110 - 370 mcg/dL    TIBC 265 228 - 428 mcg/dL    Iron % saturation 38 20 - 50 %   VITAMIN B12    Collection Time: 11/01/22  9:14 AM   Result Value Ref Range    Vitamin B12 714 211 - 911 pg/mL   VITAMIN D, 25 HYDROXY    Collection Time: 11/01/22  9:14 AM   Result Value Ref Range    VITAMIN D, 25-HYDROXY 61.1 32.0 - 100.0 ng/mL       Assessment/Plan:   She is currently 1 year s/p laparoscopic gastric bypass surgery with a total weight loss of 106 lbs to date, doing well. Labs were reviewed and pt was instructed to continue MVI/B1/B12/D supplementation . Long discussion regarding high density carbohydrates and their impacts to weight regain, hunger, cravings and reactive hypoglycemia. Also discussed to avoid slider foods such as liquids and soups. Cut down to one protein shake daily, have solid protein for lunch. Continue with skin care, support garments, and use of Nystatin for skin itching/irritation. Refill for Nystatin placed.    Discussed she may be more fertile with weight loss and she still has possible small chance of pregnancy with cycle tracking. We have reviewed the components of a successful postoperative course including requirement for a high protein, low carbohydrate diet, 64 oz a day of zero calorie liquids, daily vitamin supplementation, daily exercise (150 mis/week), regular follow-up, and participation in support groups. Refer to registered dietitian for more detailed medical nutrition therapy as needed. The primary encounter diagnosis was Post-resection malabsorption. Diagnoses of S/P gastric bypass, Excessive and redundant skin and subcutaneous tissue, Obesity (BMI 30-39.9), and BMI 38.0-38.9,adult were also pertinent to this visit. Follow-up and Dispositions    Return in about 1 year (around 11/15/2023). with labs, sooner as needed. Prerna Corrales NP    A total time of 35 minutes was spent face-to-face with the patient during this encounter and over half of that time was spent on counseling.

## 2023-10-16 ENCOUNTER — TELEPHONE (OUTPATIENT)
Age: 38
End: 2023-10-16

## 2023-10-16 DIAGNOSIS — Z98.84 HX OF GASTRIC BYPASS: ICD-10-CM

## 2023-10-16 DIAGNOSIS — K91.2 POSTSURGICAL MALABSORPTION, NOT ELSEWHERE CLASSIFIED: Primary | ICD-10-CM

## 2023-10-16 NOTE — TELEPHONE ENCOUNTER
Patient called back regarding where she wants the lab orders sent to. She plans to get labs at [facility] lab and she provided their fax number: [phone]. Thank you!

## 2023-10-16 NOTE — TELEPHONE ENCOUNTER
Patient called to request a note for the consumption of alcohol for an upcoming cruise. Patient needs lab orders for upcoming follow up. Thank you.

## 2023-10-31 LAB
A/G RATIO: 1.5 RATIO (ref 1.1–2.6)
ALBUMIN SERPL-MCNC: 4 G/DL (ref 3.5–5)
ALP BLD-CCNC: 90 U/L (ref 25–115)
ALT SERPL-CCNC: 19 U/L (ref 5–40)
ANION GAP SERPL CALCULATED.3IONS-SCNC: 7 MMOL/L (ref 3–15)
AST SERPL-CCNC: 22 U/L (ref 10–37)
BASOPHILS # BLD: 1 % (ref 0–2)
BASOPHILS ABSOLUTE: 0 K/UL (ref 0–0.2)
BILIRUB SERPL-MCNC: 0.5 MG/DL (ref 0.2–1.2)
BUN BLDV-MCNC: 8 MG/DL (ref 6–22)
CALCIUM SERPL-MCNC: 9.2 MG/DL (ref 8.4–10.5)
CHLORIDE BLD-SCNC: 104 MMOL/L (ref 98–110)
CO2: 27 MMOL/L (ref 20–32)
CREAT SERPL-MCNC: 0.6 MG/DL (ref 0.5–1.2)
EOSINOPHIL # BLD: 3 % (ref 0–6)
EOSINOPHILS ABSOLUTE: 0.2 K/UL (ref 0–0.5)
FERRITIN: 73 NG/ML (ref 10–291)
GLOBULIN: 2.7 G/DL (ref 2–4)
GLOMERULAR FILTRATION RATE: >60 ML/MIN/1.73 SQ.M.
GLUCOSE: 92 MG/DL (ref 70–99)
HCT VFR BLD CALC: 43.7 % (ref 35.1–46.5)
HEMOGLOBIN: 14.4 G/DL (ref 11.7–15.5)
IRON % SATURATION: 34 % (ref 20–50)
IRON: 101 MCG/DL (ref 30–160)
LYMPHOCYTES # BLD: 33 % (ref 20–45)
LYMPHOCYTES ABSOLUTE: 2 K/UL (ref 1–4.8)
MCH RBC QN AUTO: 31 PG (ref 26–34)
MCHC RBC AUTO-ENTMCNC: 33 G/DL (ref 31–36)
MCV RBC AUTO: 94 FL (ref 80–99)
MONOCYTES ABSOLUTE: 0.4 K/UL (ref 0.1–1)
MONOCYTES: 7 % (ref 3–12)
NEUTROPHILS ABSOLUTE: 3.5 K/UL (ref 1.8–7.7)
NEUTROPHILS: 57 % (ref 40–75)
PDW BLD-RTO: 12.9 % (ref 10–15.5)
PLATELET # BLD: 302 K/UL (ref 140–440)
PMV BLD AUTO: 12 FL (ref 9–13)
POTASSIUM SERPL-SCNC: 4.5 MMOL/L (ref 3.5–5.5)
RBC: 4.67 M/UL (ref 3.8–5.2)
SODIUM BLD-SCNC: 138 MMOL/L (ref 133–145)
THIAMINE BLOOD: 189 NMOL/L (ref 78–185)
TOTAL IRON BINDING CAPACITY: 296 MCG/DL (ref 228–428)
TOTAL PROTEIN: 6.7 G/DL (ref 6.4–8.3)
UIBC: 195 MCG/DL (ref 110–370)
VITAMIN B-12: 858 PG/ML (ref 211–911)
VITAMIN D 25-HYDROXY: 47.1 NG/ML (ref 32–100)
WBC: 6.1 K/UL (ref 4–11)

## 2023-11-14 ENCOUNTER — OFFICE VISIT (OUTPATIENT)
Age: 38
End: 2023-11-14
Payer: COMMERCIAL

## 2023-11-14 VITALS
BODY MASS INDEX: 34.83 KG/M2 | TEMPERATURE: 98 F | DIASTOLIC BLOOD PRESSURE: 106 MMHG | HEIGHT: 64 IN | SYSTOLIC BLOOD PRESSURE: 133 MMHG | HEART RATE: 56 BPM | OXYGEN SATURATION: 96 % | WEIGHT: 204 LBS

## 2023-11-14 DIAGNOSIS — Z98.84 S/P GASTRIC BYPASS: ICD-10-CM

## 2023-11-14 DIAGNOSIS — K91.2 POSTOPERATIVE INTESTINAL MALABSORPTION: Primary | ICD-10-CM

## 2023-11-14 DIAGNOSIS — E66.9 CLASS 2 OBESITY WITHOUT SERIOUS COMORBIDITY WITH BODY MASS INDEX (BMI) OF 35.0 TO 35.9 IN ADULT, UNSPECIFIED OBESITY TYPE: ICD-10-CM

## 2023-11-14 PROCEDURE — 99213 OFFICE O/P EST LOW 20 MIN: CPT | Performed by: NURSE PRACTITIONER

## 2023-11-14 PROCEDURE — 3074F SYST BP LT 130 MM HG: CPT | Performed by: NURSE PRACTITIONER

## 2023-11-14 PROCEDURE — 3078F DIAST BP <80 MM HG: CPT | Performed by: NURSE PRACTITIONER

## 2023-11-14 RX ORDER — NORETHINDRONE 0.35 MG/1
1 TABLET ORAL DAILY
COMMUNITY
Start: 2023-10-06

## 2023-11-14 ASSESSMENT — ENCOUNTER SYMPTOMS
ABDOMINAL PAIN: 0
BLOOD IN STOOL: 0
CONSTIPATION: 1
NAUSEA: 0
VOMITING: 0

## 2023-11-14 NOTE — PROGRESS NOTES
BP elevated in office, pt asymptomatic, she reports she was anxious about our visit today. Interested in [address] [facility] Erika to assist with weight loss plateau but unable to take phentermine, Qsymia, and Contrave due to interactions with Vyvanse. She will try recommendations above and follow up if needing further assistance. We have reviewed the components of a successful postoperative course including requirement for a high protein, low carbohydrate diet, 64 oz a day of zero calorie liquids, daily vitamin supplementation, daily exercise (150 mis/week), regular follow-up, and participation in support groups. Refer to registered dietitian for more detailed medical nutrition therapy as needed. The primary encounter diagnosis was Postoperative intestinal malabsorption. Diagnoses of S/P gastric bypass and Class 2 obesity without serious comorbidity with body mass index (BMI) of 35.0 to 35.9 in adult, unspecified obesity type were also pertinent to this visit.        COURT Moncada - NP

## 2023-11-20 ASSESSMENT — ENCOUNTER SYMPTOMS
WHEEZING: 0
DIARRHEA: 0
SHORTNESS OF BREATH: 0
EYES NEGATIVE: 1
COUGH: 0
ABDOMINAL DISTENTION: 0

## 2023-12-15 ENCOUNTER — TELEPHONE (OUTPATIENT)
Age: 38
End: 2023-12-15

## 2023-12-15 NOTE — TELEPHONE ENCOUNTER
Patient was given a letter in October for an upcoming cruise. Patient states cruise line won't accept her letter because it was created in October. Patient is trying to avoid being charged for a drink package. The company is requesting a letter with a new date.  Informed patient that I will forward this message to [facility], NP.

## 2024-05-03 NOTE — PROGRESS NOTES
Ami Wynn is a 27 y.o. female presents to office for chest pain x 1 week. 1. Have you been to the ER, urgent care clinic or hospitalized since your last visit? no  2. Have you seen any other providers outside of [facility] since your last visit? no  3.  Have you had a Flu shot this year? no      Health Maintenance items with a due date reviewed with patient:  Health Maintenance Due   Topic Date Due    Pneumococcal 19-64 Medium Risk (1 of 1 - PPSV23) 01/26/2002    DTaP/Tdap/Td series (1 - Tdap) 01/26/2004    PAP AKA CERVICAL CYTOLOGY  01/26/2004 Patient is scheduled for a colonoscopy with Dr Neal on 7/25. Please send Nulytely prep to patient's selected pharmacy selected during scheduling.       Thank you, GI [facility] Scheduler

## 2024-10-03 ENCOUNTER — TELEPHONE (OUTPATIENT)
Age: 39
End: 2024-10-03

## 2024-10-03 DIAGNOSIS — K91.2 POSTSURGICAL MALABSORPTION: Primary | ICD-10-CM

## 2024-10-03 NOTE — TELEPHONE ENCOUNTER
Patient needs her yearly labs ordered for an appointment with Debi in November.    She asked if al lipid panel and TSH could be added to the lab orders.    Also, please call patient.  She has a question if she will be able to take Pristiq extended release. Her psychiatrist would like to put Sunitha on this.    [phone]

## 2024-10-03 NOTE — TELEPHONE ENCOUNTER
Left message to patient stating labs have been ordered and relayed message regarding her taking Pristiq. Advised to call back if she has any questions.

## 2024-11-14 LAB
A/G RATIO: 1.6 RATIO (ref 1.1–2.6)
ALBUMIN: 4.1 G/DL (ref 3.5–5)
ALP BLD-CCNC: 73 U/L (ref 25–115)
ALT SERPL-CCNC: 20 U/L (ref 5–40)
ANION GAP SERPL CALCULATED.3IONS-SCNC: 7 MMOL/L (ref 3–15)
AST SERPL-CCNC: 26 U/L (ref 10–37)
BASOPHILS ABSOLUTE: 0.1 K/UL (ref 0–0.2)
BASOPHILS RELATIVE PERCENT: 1 % (ref 0–2)
BILIRUB SERPL-MCNC: 0.3 MG/DL (ref 0.2–1.2)
BUN BLDV-MCNC: 14 MG/DL (ref 6–22)
CALCIUM SERPL-MCNC: 9.5 MG/DL (ref 8.4–10.5)
CHLORIDE BLD-SCNC: 104 MMOL/L (ref 98–110)
CHOLESTEROL, TOTAL: 169 MG/DL (ref 110–200)
CHOLESTEROL/HDL RATIO: 2.5 (ref 0–5)
CO2: 27 MMOL/L (ref 20–32)
CREAT SERPL-MCNC: 0.7 MG/DL (ref 0.5–1.2)
EOSINOPHIL # BLD: 3 % (ref 0–6)
EOSINOPHILS ABSOLUTE: 0.1 K/UL (ref 0–0.5)
FERRITIN: 23 NG/ML (ref 10–291)
FOLATE: 7.9 NG/ML
GFR, ESTIMATED: >60 ML/MIN/1.73 SQ.M.
GLOBULIN: 2.5 G/DL (ref 2–4)
GLUCOSE: 90 MG/DL (ref 70–99)
HCT VFR BLD CALC: 46 % (ref 35.1–46.5)
HDLC SERPL-MCNC: 67 MG/DL
HEMOGLOBIN: 14.7 G/DL (ref 11.7–15.5)
IRON % SATURATION: 22 % (ref 20–50)
IRON: 75 MCG/DL (ref 30–160)
LDL CHOLESTEROL: 90 MG/DL (ref 50–99)
LDL/HDL RATIO: 1.4
LYMPHOCYTES # BLD: 34 % (ref 20–45)
LYMPHOCYTES ABSOLUTE: 1.6 K/UL (ref 1–4.8)
MCH RBC QN AUTO: 30 PG (ref 26–34)
MCHC RBC AUTO-ENTMCNC: 32 G/DL (ref 31–36)
MCV RBC AUTO: 92 FL (ref 80–99)
MONOCYTES ABSOLUTE: 0.3 K/UL (ref 0.1–1)
MONOCYTES: 7 % (ref 3–12)
NEUTROPHILS ABSOLUTE: 2.7 K/UL (ref 1.8–7.7)
NEUTROPHILS: 56 % (ref 40–75)
NON-HDL CHOLESTEROL: 102 MG/DL
PDW BLD-RTO: 13.2 % (ref 10–15.5)
PLATELET # BLD: 236 K/UL (ref 140–440)
PMV BLD AUTO: 12 FL (ref 9–13)
POTASSIUM SERPL-SCNC: 4.6 MMOL/L (ref 3.5–5.5)
RBC # BLD: 4.99 M/UL (ref 3.8–5.2)
SODIUM BLD-SCNC: 138 MMOL/L (ref 133–145)
THIAMINE BLOOD: 175 NMOL/L (ref 78–185)
TOTAL IRON BINDING CAPACITY: 345 MCG/DL (ref 228–428)
TOTAL PROTEIN: 6.6 G/DL (ref 6.4–8.3)
TRIGL SERPL-MCNC: 56 MG/DL (ref 40–149)
TSH SERPL DL<=0.05 MIU/L-ACNC: 0.97 MCU/ML (ref 0.27–4.2)
UIBC: 270 MCG/DL (ref 110–370)
VITAMIN B-12: 690 PG/ML (ref 211–911)
VITAMIN D 25-HYDROXY: 38.4 NG/ML (ref 32–100)
VLDLC SERPL CALC-MCNC: 11 MG/DL (ref 8–30)
WBC # BLD: 4.8 K/UL (ref 4–11)

## 2024-11-19 ENCOUNTER — OFFICE VISIT (OUTPATIENT)
Age: 39
End: 2024-11-19
Payer: COMMERCIAL

## 2024-11-19 VITALS
HEART RATE: 63 BPM | BODY MASS INDEX: 36.7 KG/M2 | WEIGHT: 215 LBS | SYSTOLIC BLOOD PRESSURE: 141 MMHG | HEIGHT: 64 IN | OXYGEN SATURATION: 97 % | TEMPERATURE: 97 F | DIASTOLIC BLOOD PRESSURE: 100 MMHG

## 2024-11-19 DIAGNOSIS — Z98.84 S/P GASTRIC BYPASS: ICD-10-CM

## 2024-11-19 DIAGNOSIS — K91.2 POSTOPERATIVE INTESTINAL MALABSORPTION: Primary | ICD-10-CM

## 2024-11-19 DIAGNOSIS — Z71.3 ENCOUNTER FOR WEIGHT LOSS COUNSELING: ICD-10-CM

## 2024-11-19 DIAGNOSIS — E66.812 CLASS 2 SEVERE OBESITY WITH SERIOUS COMORBIDITY AND BODY MASS INDEX (BMI) OF 36.0 TO 36.9 IN ADULT, UNSPECIFIED OBESITY TYPE: ICD-10-CM

## 2024-11-19 DIAGNOSIS — E66.01 CLASS 2 SEVERE OBESITY WITH SERIOUS COMORBIDITY AND BODY MASS INDEX (BMI) OF 36.0 TO 36.9 IN ADULT, UNSPECIFIED OBESITY TYPE: ICD-10-CM

## 2024-11-19 DIAGNOSIS — F60.3 BORDERLINE PERSONALITY DISORDER (HCC): ICD-10-CM

## 2024-11-19 PROCEDURE — 3080F DIAST BP >= 90 MM HG: CPT | Performed by: NURSE PRACTITIONER

## 2024-11-19 PROCEDURE — 3077F SYST BP >= 140 MM HG: CPT | Performed by: NURSE PRACTITIONER

## 2024-11-19 PROCEDURE — 99214 OFFICE O/P EST MOD 30 MIN: CPT | Performed by: NURSE PRACTITIONER

## 2024-11-19 RX ORDER — LAMOTRIGINE 25 MG/1
25 TABLET ORAL
COMMUNITY

## 2024-11-19 RX ORDER — DESVENLAFAXINE 50 MG/1
50 TABLET, FILM COATED, EXTENDED RELEASE ORAL DAILY
COMMUNITY

## 2024-11-20 ASSESSMENT — ENCOUNTER SYMPTOMS
NAUSEA: 0
SHORTNESS OF BREATH: 0
ABDOMINAL DISTENTION: 0
CONSTIPATION: 0
BLOOD IN STOOL: 0
VOMITING: 0
EYES NEGATIVE: 1
WHEEZING: 0
DIARRHEA: 0
COUGH: 0
ABDOMINAL PAIN: 0

## 2024-11-21 NOTE — PROGRESS NOTES
Bariatric Postoperative Nurse Note      Sunitha Sanders is a 39 y.o. female status post laparoscopic gastric bypass surgery performed on 11/9/21.      All Post-Ops (including two weeks)  -# of grams of protein daily? Unknown   -sources of protein? Protein shake, chicken, beef, eggs, yogurt, cheese  -# of oz of sugar free fluids from all sources daily?  64 oz   -Nausea? No  -Vomiting? No  -Difficulty swallow/food sticking? No  -Heartburn/regurgitation? No  -Character of bowel movements (diarrhea/constipation/bloody stools?) regular  -Which multivitamin product are you taking? Procare   -What dose and how frequently are you taking calcium citrate? TID  - from any iron-containing multivitamin by 2 hours? Yes  -Ulcer risk exposures:   NSAID No  Tobacco No  Alcohol No  Steroids No  -Minutes of physical activity and what type? Biking and walking         
150 min exercise (walking/cardio) weekly.     4. Incision Site Reaction.  She reports occasional swelling and itching at one of the incision sites from her gastric bypass surgery. This is likely due to friction from clothing as it appears the site is in the area where her waistband would be. She is advised to monitor the reaction and consider what clothing she wore in the previous 24 hours if the reaction occurs again as she may also be reacting to a type of material. If the symptoms persist or worsen, she should seek further evaluation.    5. Elevated blood pressure.   She is asymptomatic and elevated in office readings most likely elevated due to stress from driving. She is advised to monitor her blood pressure at home several times a week, maintain a log of the readings, and follow up with her PCP if no improvement. She should report any symptoms such as severe headaches, chest pain, or vision changes to the emergency room immediately.     Follow up in 4-6 weeks after starting medication/weight management.     The primary encounter diagnosis was Postoperative intestinal malabsorption. Diagnoses of S/P gastric bypass, Class 2 severe obesity with serious comorbidity and body mass index (BMI) of 36.0 to 36.9 in adult, unspecified obesity type, Encounter for weight loss counseling, and Borderline personality disorder (HCC) were also pertinent to this visit.     COURT Alberto - NP    The patient (or guardian, if applicable) and other individuals in attendance with the patient were advised that Artificial Intelligence will be utilized during this visit to record and process the conversation to generate a clinical note. The patient (or guardian, if applicable) and other individuals in attendance at the appointment consented to the use of AI, including the recording.      I spent >35 minutes reviewing previous notes, test results, and face to face with the patient counseling and discussing the treatment plan

## 2025-03-31 ENCOUNTER — TELEPHONE (OUTPATIENT)
Age: 40
End: 2025-03-31

## 2025-03-31 NOTE — TELEPHONE ENCOUNTER
Pt called and stated that her Insurance covers the Zepbound with a PA. Pt would like that sent to pharmacy on file

## 2025-07-07 ENCOUNTER — TELEPHONE (OUTPATIENT)
Age: 40
End: 2025-07-07

## 2025-07-07 NOTE — TELEPHONE ENCOUNTER
Pt states she has called and requested Zepbound. Pt states her insurance does cover. Please send to pharmacy

## (undated) DEVICE — TROCAR ENDOSCP SHFT L100MM DIA12MM INTEGR STBL ENDOPATH

## (undated) DEVICE — 4-PORT MANIFOLD: Brand: NEPTUNE 2

## (undated) DEVICE — SCISSORS ENDOSCP DIA5MM CRV MPLR CAUT W/ RATCH HNDL

## (undated) DEVICE — SOLUTION IV 1000ML 0.9% SOD CHL

## (undated) DEVICE — SHEAR HARMONIC ACET 5MMX36CM -- ACE PLUS

## (undated) DEVICE — INTENDED FOR TISSUE SEPARATION, AND OTHER PROCEDURES THAT REQUIRE A SHARP SURGICAL BLADE TO PUNCTURE OR CUT.: Brand: BARD-PARKER SAFETY BLADES SIZE 11, STERILE

## (undated) DEVICE — SET SUCT IRR TIP DISP STRYKEFLOW2

## (undated) DEVICE — SUTURE MCRYL SZ 4-0 L27IN ABSRB UD L24MM PS-1 3/8 CIR PRIM Y935H

## (undated) DEVICE — COVER,LIGHT HANDLE,FLX,1/PK: Brand: MEDLINE INDUSTRIES, INC.

## (undated) DEVICE — RELOAD STPL L60MM H1-2.6MM MESENTERY THN TISS WHT 6 ROW

## (undated) DEVICE — TRUE CONTENT TO BE POPULATED AS PART OF REBRANDING: Brand: ARGYLE

## (undated) DEVICE — RELOAD STPL L60MM H1.5-3.6MM REG TISS BLU GRIPPING SURF B

## (undated) DEVICE — HANDLE PRB DIA5MM HND CTRL PSTL GRP ENDOPATH PRB + II

## (undated) DEVICE — STAPLE INT WHT BLU G GRN BLK REINF FOR ENDOPATH ECHELON FLX

## (undated) DEVICE — BAG DRNGE C650ML H SZ 5-38 MAMM TISS EXP CNTOUR PROF NACL

## (undated) DEVICE — TROCAR ENDOSCP BLDELSS 12X100 MM W/ HNDL STBL SL OPT TIP

## (undated) DEVICE — TAPE ADH W3INXL10YD PLAS TRNSPAR H2O RESIST HYPOALRG CURAD

## (undated) DEVICE — TROCAR LAP L100MM DIA5MM BLDELSS W/ STBL SL ENDOPATH XCEL

## (undated) DEVICE — GLOVE SURG SZ 7 L11.33IN FNGR THK9.8MIL STRW LTX POLYMER

## (undated) DEVICE — SUTURE VCRL SZ 2-0 L54IN ABSRB VLT W/O NDL POLYGLACTIN 910 J618H

## (undated) DEVICE — STAPLER SKIN LN REINF 60 MM ECHELON ENDOPATH

## (undated) DEVICE — GAUZE SPONGES,8 PLY: Brand: CURITY

## (undated) DEVICE — STRIP,CLOSURE,WOUND,MEDI-STRIP,1/2X4: Brand: MEDLINE

## (undated) DEVICE — BLANKET WRM W29.9XL79.1IN UP BODY FORC AIR MISTRAL-AIR

## (undated) DEVICE — 3M™ STERI-STRIP™ COMPOUND BENZOIN TINCTURE 40 BAGS/CARTON 4 CARTONS/CASE C1544: Brand: 3M™ STERI-STRIP™

## (undated) DEVICE — TISSUE RETRIEVAL SYSTEM: Brand: INZII RETRIEVAL SYSTEM

## (undated) DEVICE — BLANKET WRM W40.2XL55.9IN IORT LO BODY + MISTRAL AIR

## (undated) DEVICE — STAPLER SKIN L440MM 32MM LNG 12 FIRING B FRM PWR + GRIPPING

## (undated) DEVICE — GARMENT,MEDLINE,DVT,INT,CALF,FOAM,MED: Brand: MEDLINE

## (undated) DEVICE — REM POLYHESIVE ADULT PATIENT RETURN ELECTRODE: Brand: VALLEYLAB

## (undated) DEVICE — SUTURE VCRL SZ 3-0 L27IN ABSRB VLT L26MM SH 1/2 CIR J316H

## (undated) DEVICE — TROCAR ENDOSCP L100MM DIA12MM STBL SL BLDELSS ENDOPATH XCEL

## (undated) DEVICE — SUT SLK 2-0SH 30IN BLK --

## (undated) DEVICE — SOLUTION IRRIG 1000ML H2O STRL BLT

## (undated) DEVICE — PACK PROCEDURE SURG LAPAROSCOPY 17X7 MM BRTRC PRIMUS